# Patient Record
Sex: FEMALE | Race: WHITE | Employment: OTHER | URBAN - METROPOLITAN AREA
[De-identification: names, ages, dates, MRNs, and addresses within clinical notes are randomized per-mention and may not be internally consistent; named-entity substitution may affect disease eponyms.]

---

## 2018-07-24 ENCOUNTER — APPOINTMENT (OUTPATIENT)
Dept: GENERAL RADIOLOGY | Age: 63
End: 2018-07-24
Payer: COMMERCIAL

## 2018-07-24 ENCOUNTER — HOSPITAL ENCOUNTER (EMERGENCY)
Age: 63
Discharge: HOME | End: 2018-07-24
Attending: EMERGENCY MEDICINE
Payer: COMMERCIAL

## 2018-07-24 ENCOUNTER — APPOINTMENT (OUTPATIENT)
Dept: CT IMAGING | Age: 63
End: 2018-07-24
Payer: COMMERCIAL

## 2018-07-24 VITALS
BODY MASS INDEX: 51.04 KG/M2 | HEIGHT: 60 IN | SYSTOLIC BLOOD PRESSURE: 178 MMHG | TEMPERATURE: 98.2 F | OXYGEN SATURATION: 96 % | HEART RATE: 80 BPM | DIASTOLIC BLOOD PRESSURE: 76 MMHG | RESPIRATION RATE: 16 BRPM | WEIGHT: 260 LBS
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
## 2018-07-24 DIAGNOSIS — S02.85XA CLOSED FRACTURE OF ORBITAL WALL, INITIAL ENCOUNTER (HCC): Primary | ICD-10-CM

## 2018-07-24 PROCEDURE — 6370000000 HC RX 637 (ALT 250 FOR IP): Performed by: EMERGENCY MEDICINE

## 2018-07-24 PROCEDURE — 70450 CT HEAD/BRAIN W/O DYE: CPT

## 2018-07-24 PROCEDURE — 70486 CT MAXILLOFACIAL W/O DYE: CPT

## 2018-07-24 PROCEDURE — 73110 X-RAY EXAM OF WRIST: CPT

## 2018-07-24 PROCEDURE — 99284 EMERGENCY DEPT VISIT MOD MDM: CPT

## 2018-07-24 RX ORDER — SENNA PLUS 8.6 MG/1
1 TABLET ORAL DAILY
COMMUNITY

## 2018-07-24 RX ORDER — MELOXICAM 15 MG/1
15 TABLET ORAL DAILY
COMMUNITY
End: 2022-01-01 | Stop reason: HOSPADM

## 2018-07-24 RX ORDER — LOSARTAN POTASSIUM 50 MG/1
50 TABLET ORAL DAILY
COMMUNITY
End: 2022-01-01 | Stop reason: HOSPADM

## 2018-07-24 RX ORDER — SIMVASTATIN 10 MG
10 TABLET ORAL NIGHTLY
COMMUNITY

## 2018-07-24 RX ORDER — OXYCODONE HYDROCHLORIDE AND ACETAMINOPHEN 5; 325 MG/1; MG/1
1 TABLET ORAL ONCE
Status: COMPLETED | OUTPATIENT
Start: 2018-07-24 | End: 2018-07-24

## 2018-07-24 RX ORDER — FUROSEMIDE 40 MG/1
40 TABLET ORAL DAILY
COMMUNITY

## 2018-07-24 RX ORDER — OXYCODONE HYDROCHLORIDE AND ACETAMINOPHEN 5; 325 MG/1; MG/1
1 TABLET ORAL EVERY 6 HOURS PRN
Qty: 10 TABLET | Refills: 0 | Status: SHIPPED | OUTPATIENT
Start: 2018-07-24 | End: 2018-07-27

## 2018-07-24 RX ORDER — PANTOPRAZOLE SODIUM 40 MG/1
40 GRANULE, DELAYED RELEASE ORAL
COMMUNITY

## 2018-07-24 RX ORDER — OXYBUTYNIN CHLORIDE 10 MG/1
10 TABLET, EXTENDED RELEASE ORAL DAILY
COMMUNITY

## 2018-07-24 RX ORDER — POTASSIUM CHLORIDE 1.5 G/1.77G
20 POWDER, FOR SOLUTION ORAL DAILY
COMMUNITY

## 2018-07-24 RX ADMIN — OXYCODONE HYDROCHLORIDE AND ACETAMINOPHEN 1 TABLET: 5; 325 TABLET ORAL at 17:03

## 2018-07-24 ASSESSMENT — ENCOUNTER SYMPTOMS
COUGH: 0
SORE THROAT: 0
PHOTOPHOBIA: 0
DIARRHEA: 0
CONSTIPATION: 0
NAUSEA: 0
SINUS PAIN: 0
SINUS PRESSURE: 0
ABDOMINAL PAIN: 0
FACIAL SWELLING: 1
WHEEZING: 0
VOMITING: 0
RHINORRHEA: 0
SHORTNESS OF BREATH: 0

## 2018-07-24 ASSESSMENT — PAIN DESCRIPTION - PAIN TYPE: TYPE: ACUTE PAIN

## 2018-07-24 ASSESSMENT — PAIN DESCRIPTION - LOCATION: LOCATION: FACE

## 2018-07-24 ASSESSMENT — PAIN SCALES - GENERAL
PAINLEVEL_OUTOF10: 4
PAINLEVEL_OUTOF10: 5

## 2018-07-24 ASSESSMENT — PAIN DESCRIPTION - ORIENTATION: ORIENTATION: RIGHT

## 2018-07-24 NOTE — ED PROVIDER NOTES
Patient is a 43-year-old female who presents from the nursing home via EMS following a fall. 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 Patient denies any loss of consciousness. She does admit to right-sided facial pain surrounding her eye. She denies any blurred vision. Patient is on Plavix. She denies any lightheadedness, dizziness, neck pain, chest pain, shortness of breath, abdominal pain, nausea, vomiting. Patient does complain of left wrist pain, states that she try to break her fall. No treatment prior to arrival.            Review of Systems   Constitutional: Negative for chills, fatigue and fever. HENT: Positive for facial swelling. Negative for congestion, rhinorrhea, sinus pain, sinus pressure, sneezing and sore throat. Eyes: Negative for photophobia and visual disturbance. Respiratory: Negative for cough, shortness of breath and wheezing. Cardiovascular: Negative for chest pain and palpitations. Gastrointestinal: Negative for abdominal pain, constipation, diarrhea, nausea and vomiting. Genitourinary: Negative for dysuria, frequency and hematuria. Musculoskeletal: Positive for arthralgias. Negative for neck pain and neck stiffness. Skin: Negative for rash and wound. Neurological: Negative for dizziness, light-headedness and headaches. Psychiatric/Behavioral: Negative for agitation, behavioral problems and confusion. Physical Exam   Constitutional: She is oriented to person, place, and time. She appears well-developed and well-nourished. No distress. HENT:   Head: Head is with contusion. Ecchymosis around right eye. Tenderness along right orbit. No crepitance. Tenderness of the infraorbital region. No septal hematoma. No hemotympanum. Eyes: Conjunctivae are normal. Pupils are equal, round, and reactive to light. Right eye exhibits no discharge. Left eye exhibits no discharge.    No father, maternal grandmother, mother, and paternal grandmother; Heart Disease in her maternal grandmother, maternal uncle, and mother; High Blood Pressure in her brother, father, maternal aunt, mother, and paternal uncle; High Cholesterol in her brother, father, mother, and paternal uncle; Kidney Disease in her mother; Obesity in her father and mother; Other in her father and mother; Stroke in her maternal aunt, maternal uncle, and paternal uncle. The patients home medications have been reviewed. Allergies: Patient has no known allergies. -------------------------------------------------- RESULTS -------------------------------------------------  Labs:  No results found for this visit on 07/24/18. Radiology:  XR WRIST LEFT (MIN 3 VIEWS)   Final Result   Osteoporosis and moderately severe degenerative changes on   the radial aspect of the wrist but no evidence of fracture or   dislocation. CT Facial Bones WO Contrast   Final Result   Right orbital region fractures as discussed. CT Head WO Contrast   Final Result   No CT evidence of acute intracranial injury. ------------------------- NURSING NOTES AND VITALS REVIEWED ---------------------------  Date / Time Roomed:  7/24/2018  2:50 PM  ED Bed Assignment:  [facility]/H1    The nursing notes within the ED encounter and vital signs as below have been reviewed. BP (!) 178/76   Pulse 80   Temp 98.2 °F (36.8 °C) (Oral)   Resp 16   Ht 5' (1.524 m)   Wt 260 lb (117.9 kg)   SpO2 96%   BMI 50.78 kg/m²   Oxygen Saturation Interpretation: Normal      ------------------------------------------ PROGRESS NOTES ------------------------------------------  Time: 16:32. Patient resting comfortably in bed. Discussed results with the patient. She will follow up with ENT. She will return if she has worsening symptoms. Patient understands and has no further questions at this time.      I have spoken with the patient and discussed todays results, in addition to providing specific details for the plan of care and counseling regarding the diagnosis and prognosis. Their questions are answered at this time and they are agreeable with the plan. I discussed at length with them reasons for immediate return here for re evaluation. They will followup with primary care by calling their office tomorrow. --------------------------------- ADDITIONAL PROVIDER NOTES ---------------------------------  At this time the patient is without objective evidence of an acute process requiring hospitalization or inpatient management. They have remained hemodynamically stable throughout their entire ED visit and are stable for discharge with outpatient follow-up. The plan has been discussed in detail and they are aware of the specific conditions for emergent return, as well as the importance of follow-up. New Prescriptions    OXYCODONE-ACETAMINOPHEN (PERCOCET) 5-325 MG PER TABLET    Take 1 tablet by mouth every 6 hours as needed for Pain for up to 3 days. .       Diagnosis:  1. Closed fracture of orbital wall, initial encounter ([facility] [address]    2. Fall, initial encounter        Disposition:  Patient's disposition: Discharge to home  Patient's condition is stable.                Barbie Monroe,   Resident  07/24/18 5729

## 2018-07-24 NOTE — ED NOTES
Bed: 04  Expected date:   Expected time:   Means of arrival:   Comments:     Ruth Lopez RN  07/24/18 5812

## 2018-07-24 NOTE — CARE COORDINATION
Physician ambulance eta 6:30pm, transport back to [facility] at the [facility], [address] notified.     Electronically signed by CARLOS Cheng on 7/24/2018 at 5:14 PM

## 2018-07-24 NOTE — ED NOTES
Bed: H1  Expected date:   Expected time:   Means of arrival:   Comments:  C/Luke Huynh, [facility]  07/24/18 8871

## 2018-07-27 ENCOUNTER — OFFICE VISIT (OUTPATIENT)
Dept: ENT CLINIC | Age: 63
End: 2018-07-27
Payer: COMMERCIAL

## 2018-07-27 VITALS
HEIGHT: 63 IN | WEIGHT: 278.2 LBS | HEART RATE: 83 BPM | SYSTOLIC BLOOD PRESSURE: 146 MMHG | BODY MASS INDEX: 49.29 KG/M2 | DIASTOLIC BLOOD PRESSURE: 72 MMHG

## 2018-07-27 DIAGNOSIS — S02.85XA ORBITAL FRACTURE, CLOSED, INITIAL ENCOUNTER (HCC): Primary | ICD-10-CM

## 2018-07-27 PROCEDURE — G8427 DOCREV CUR MEDS BY ELIG CLIN: HCPCS | Performed by: OTOLARYNGOLOGY

## 2018-07-27 PROCEDURE — 99204 OFFICE O/P NEW MOD 45 MIN: CPT | Performed by: OTOLARYNGOLOGY

## 2018-07-27 PROCEDURE — 3017F COLORECTAL CA SCREEN DOC REV: CPT | Performed by: OTOLARYNGOLOGY

## 2018-07-27 PROCEDURE — G8417 CALC BMI ABV UP PARAM F/U: HCPCS | Performed by: OTOLARYNGOLOGY

## 2018-07-27 PROCEDURE — 1036F TOBACCO NON-USER: CPT | Performed by: OTOLARYNGOLOGY

## 2018-08-07 ASSESSMENT — ENCOUNTER SYMPTOMS
SHORTNESS OF BREATH: 0
VOMITING: 0
COUGH: 0
HEARTBURN: 0
DOUBLE VISION: 0
BLURRED VISION: 0

## 2018-08-07 NOTE — PROGRESS NOTES
8.6 MG tablet, Take 1 tablet by mouth daily, Disp: , Rfl:     simvastatin (ZOCOR) 10 MG tablet, Take 10 mg by mouth nightly, Disp: , Rfl:     acetaminophen 650 MG TABS, Take 650 mg by mouth every 4 hours as needed. , Disp: 120 tablet, Rfl: 3    clopidogrel (PLAVIX) 75 MG tablet, Take 1 tablet by mouth daily. , Disp: 30 tablet, Rfl: 3    sertraline (ZOLOFT) 50 MG tablet, Take 1 tablet by mouth daily. , Disp: 30 tablet, Rfl: 5    tolterodine (DETROL LA) 4 MG ER capsule, Take 1 capsule by mouth daily. , Disp: 30 capsule, Rfl: 5    FIBER SELECT GUMMIES PO, Take  by mouth. Two gummies qam, Disp: , Rfl:   Patient has no known allergies. Social History   Substance Use Topics    Smoking status: Never Smoker    Smokeless tobacco: Never Used    Alcohol use No      Comment: rarely     Family History   Problem Relation Age of Onset    Cancer Mother         ovarian cancer    Heart Disease Mother         CHF    Other Mother         Hepatitis    Arthritis Mother     Diabetes Mother     High Blood Pressure Mother     High Cholesterol Mother     Kidney Disease Mother     Obesity Mother     Diabetes Father     Other Father         Cirrohis of liver and Black Lung    Alcohol Abuse Father     Arthritis Father     High Blood Pressure Father     High Cholesterol Father     Obesity Father     Arthritis Brother     High Blood Pressure Brother     High Cholesterol Brother     Arthritis Maternal Aunt     High Blood Pressure Maternal Aunt     Stroke Maternal Aunt     Arthritis Maternal Uncle     Heart Disease Maternal Uncle     Stroke Maternal Uncle     Arthritis Paternal Uncle     High Blood Pressure Paternal Uncle     High Cholesterol Paternal Uncle     Stroke Paternal Uncle     Diabetes Maternal Grandmother     Heart Disease Maternal Grandmother     Diabetes Paternal Grandmother        Review of Systems   Constitutional: Negative for chills and fever. HENT: Positive for congestion.  Negative for ear surgical indications this time. Patient will continue ice, prevent any swelling with no nose blowing, finishing therapy in follow-up as needed. Dr. Bonnie Casper D.O.  Ms. Ashwin Pérez.  Otolaryngology Facial Plastic Surgery  :  [facility] Otolaryngology/Facial Plastic Surgery Residency  Associate Clinical Professor:  Rosa Schneider, [facility]

## 2018-09-14 ENCOUNTER — PROCEDURE VISIT (OUTPATIENT)
Dept: AUDIOLOGY | Age: 63
End: 2018-09-14
Payer: COMMERCIAL

## 2018-09-14 ENCOUNTER — OFFICE VISIT (OUTPATIENT)
Dept: ENT CLINIC | Age: 63
End: 2018-09-14
Payer: COMMERCIAL

## 2018-09-14 VITALS
HEART RATE: 66 BPM | HEIGHT: 63 IN | SYSTOLIC BLOOD PRESSURE: 142 MMHG | DIASTOLIC BLOOD PRESSURE: 71 MMHG | BODY MASS INDEX: 50.5 KG/M2 | WEIGHT: 285 LBS

## 2018-09-14 DIAGNOSIS — H90.A22 SENSORINEURAL HEARING LOSS (SNHL) OF LEFT EAR WITH RESTRICTED HEARING OF RIGHT EAR: Primary | ICD-10-CM

## 2018-09-14 DIAGNOSIS — T16.2XXA FB EAR, LEFT, INITIAL ENCOUNTER: Primary | ICD-10-CM

## 2018-09-14 PROCEDURE — 69200 CLEAR OUTER EAR CANAL: CPT | Performed by: OTOLARYNGOLOGY

## 2018-09-14 PROCEDURE — 92557 COMPREHENSIVE HEARING TEST: CPT | Performed by: AUDIOLOGIST

## 2018-09-14 PROCEDURE — 3017F COLORECTAL CA SCREEN DOC REV: CPT | Performed by: OTOLARYNGOLOGY

## 2018-09-14 PROCEDURE — 1036F TOBACCO NON-USER: CPT | Performed by: OTOLARYNGOLOGY

## 2018-09-14 PROCEDURE — G8427 DOCREV CUR MEDS BY ELIG CLIN: HCPCS | Performed by: OTOLARYNGOLOGY

## 2018-09-14 PROCEDURE — 92567 TYMPANOMETRY: CPT | Performed by: AUDIOLOGIST

## 2018-09-14 PROCEDURE — G8417 CALC BMI ABV UP PARAM F/U: HCPCS | Performed by: OTOLARYNGOLOGY

## 2018-09-14 ASSESSMENT — ENCOUNTER SYMPTOMS
RHINORRHEA: 0
EYES NEGATIVE: 1
RESPIRATORY NEGATIVE: 1
SORE THROAT: 0
GASTROINTESTINAL NEGATIVE: 1

## 2018-09-14 NOTE — PROGRESS NOTES
Subjective:      Patient ID:  Everardo Bourgeois is a 61 y.o. female. HPI: Pt first noticed a subjective muffled hearing loss in the left ear a year and a half ago. Pt currently resides at [facility] and has been a resident since 2015. One year prior pt states a doctor told her she had some hearing loss in the left ear. Pt states she had an audiogram one year ago but does not know the results or have a copy. Pt states hearing loss occurred gradually over time. Pt states when her ears pop that the muffled sound resolves temporaily on the left. Pt is on a daily nose spray but does not remember the name. Hearing Loss  Patient presents today with complaints of hearing loss. Concern regarding hearing has been present for 1 year. She had failed a prior hearing test.  The patient reports turning up the T.V..          Patient does not have hearing aids at this time. History of Head trauma: yes   Description: Pt had a fall in July with right orbital fracture, MRI performed at that time  History of surgery to the head/neck: no   Description: none  History of cerumen impaction: no  History of noise exposure: yes   Type: Worked at [employer]: no   Length of time: none  Hearing loss: yes    Fluctuating: yes  Aural pressure: no  Tinnitus: no  Otalgia:no    Patient's medications, allergies, past medical, surgical, social and family histories were reviewed and updated as appropriate. Review of Systems   Constitutional: Negative. HENT: Positive for hearing loss. Negative for congestion, ear discharge, ear pain, postnasal drip, rhinorrhea, sore throat and tinnitus. Eyes: Negative. Respiratory: Negative. Cardiovascular: Negative. Gastrointestinal: Negative. Endocrine: Negative. Genitourinary: Negative. Musculoskeletal: Negative. Skin: Negative. Allergic/Immunologic: Negative for environmental allergies and food allergies.    Neurological: Negative for dizziness, light-headedness

## 2021-01-01 ENCOUNTER — HOSPITAL ENCOUNTER (OUTPATIENT)
Dept: MAMMOGRAPHY | Age: 66
Discharge: HOME | End: 2021-07-03
Payer: MEDICARE

## 2021-01-01 VITALS — WEIGHT: 275 LBS | BODY MASS INDEX: 50.61 KG/M2 | HEIGHT: 62 IN

## 2021-01-01 DIAGNOSIS — Z12.31 SCREENING MAMMOGRAM FOR HIGH-RISK PATIENT: ICD-10-CM

## 2021-01-01 PROCEDURE — 77063 BREAST TOMOSYNTHESIS BI: CPT

## 2022-01-01 ENCOUNTER — APPOINTMENT (OUTPATIENT)
Dept: GENERAL RADIOLOGY | Age: 67
DRG: 698 | End: 2022-01-01
Payer: MEDICARE

## 2022-01-01 ENCOUNTER — HOSPITAL ENCOUNTER (INPATIENT)
Age: 67
LOS: 2 days | Discharge: NURSING, REHAB OR LONG-TERM CARE | DRG: 872 | End: 2022-03-29
Attending: EMERGENCY MEDICINE | Admitting: INTERNAL MEDICINE
Payer: MEDICARE

## 2022-01-01 ENCOUNTER — HOSPITAL ENCOUNTER (INPATIENT)
Age: 67
LOS: 10 days | DRG: 698 | End: 2022-05-10
Attending: EMERGENCY MEDICINE | Admitting: FAMILY MEDICINE
Payer: MEDICARE

## 2022-01-01 ENCOUNTER — APPOINTMENT (OUTPATIENT)
Dept: CT IMAGING | Age: 67
DRG: 872 | End: 2022-01-01
Payer: MEDICARE

## 2022-01-01 ENCOUNTER — APPOINTMENT (OUTPATIENT)
Dept: NEUROLOGY | Age: 67
DRG: 698 | End: 2022-01-01
Payer: MEDICARE

## 2022-01-01 ENCOUNTER — APPOINTMENT (OUTPATIENT)
Dept: CT IMAGING | Age: 67
DRG: 698 | End: 2022-01-01
Payer: MEDICARE

## 2022-01-01 VITALS
TEMPERATURE: 98 F | OXYGEN SATURATION: 96 % | SYSTOLIC BLOOD PRESSURE: 102 MMHG | BODY MASS INDEX: 41.61 KG/M2 | HEART RATE: 92 BPM | DIASTOLIC BLOOD PRESSURE: 54 MMHG | HEIGHT: 62 IN | WEIGHT: 226.13 LBS | RESPIRATION RATE: 22 BRPM

## 2022-01-01 VITALS
WEIGHT: 218.5 LBS | HEART RATE: 89 BPM | SYSTOLIC BLOOD PRESSURE: 101 MMHG | BODY MASS INDEX: 40.21 KG/M2 | DIASTOLIC BLOOD PRESSURE: 61 MMHG | TEMPERATURE: 98.4 F | RESPIRATION RATE: 18 BRPM | OXYGEN SATURATION: 99 % | HEIGHT: 62 IN

## 2022-01-01 DIAGNOSIS — G93.41 ACUTE METABOLIC ENCEPHALOPATHY: Primary | ICD-10-CM

## 2022-01-01 DIAGNOSIS — N93.9 VAGINAL BLEEDING: Primary | ICD-10-CM

## 2022-01-01 DIAGNOSIS — N30.01 ACUTE CYSTITIS WITH HEMATURIA: ICD-10-CM

## 2022-01-01 DIAGNOSIS — N17.9 ACUTE KIDNEY INJURY (HCC): ICD-10-CM

## 2022-01-01 DIAGNOSIS — N39.0 COMPLICATED UTI (URINARY TRACT INFECTION): ICD-10-CM

## 2022-01-01 LAB
ABO/RH: NORMAL
ACINETOBACTER CALCOAC BAUMANNII COMPLEX BY PCR: NOT DETECTED
ALBUMIN SERPL-MCNC: 1.6 G/DL (ref 3.5–5.2)
ALBUMIN SERPL-MCNC: 1.7 G/DL (ref 3.5–5.2)
ALBUMIN SERPL-MCNC: 1.7 G/DL (ref 3.5–5.2)
ALBUMIN SERPL-MCNC: 1.8 G/DL (ref 3.5–5.2)
ALBUMIN SERPL-MCNC: 1.8 G/DL (ref 3.5–5.2)
ALBUMIN SERPL-MCNC: 2 G/DL (ref 3.5–5.2)
ALBUMIN SERPL-MCNC: 2.7 G/DL (ref 3.5–5.2)
ALBUMIN SERPL-MCNC: 2.7 G/DL (ref 3.5–5.2)
ALBUMIN SERPL-MCNC: 2.8 G/DL (ref 3.5–5.2)
ALP BLD-CCNC: 109 U/L (ref 35–104)
ALP BLD-CCNC: 121 U/L (ref 35–104)
ALP BLD-CCNC: 127 U/L (ref 35–104)
ALP BLD-CCNC: 148 U/L (ref 35–104)
ALP BLD-CCNC: 79 U/L (ref 35–104)
ALP BLD-CCNC: 83 U/L (ref 35–104)
ALP BLD-CCNC: 83 U/L (ref 35–104)
ALP BLD-CCNC: 84 U/L (ref 35–104)
ALP BLD-CCNC: 88 U/L (ref 35–104)
ALP BLD-CCNC: 89 U/L (ref 35–104)
ALP BLD-CCNC: 91 U/L (ref 35–104)
ALT SERPL-CCNC: 5 U/L (ref 0–32)
ALT SERPL-CCNC: 6 U/L (ref 0–32)
ALT SERPL-CCNC: 8 U/L (ref 0–32)
ALT SERPL-CCNC: 9 U/L (ref 0–32)
AMMONIA: 11 UMOL/L (ref 11–51)
AMMONIA: 15 UMOL/L (ref 11–51)
AMORPHOUS: PRESENT
ANION GAP SERPL CALCULATED.3IONS-SCNC: 10 MMOL/L (ref 7–16)
ANION GAP SERPL CALCULATED.3IONS-SCNC: 10 MMOL/L (ref 7–16)
ANION GAP SERPL CALCULATED.3IONS-SCNC: 11 MMOL/L (ref 7–16)
ANION GAP SERPL CALCULATED.3IONS-SCNC: 14 MMOL/L (ref 7–16)
ANION GAP SERPL CALCULATED.3IONS-SCNC: 15 MMOL/L (ref 7–16)
ANION GAP SERPL CALCULATED.3IONS-SCNC: 20 MMOL/L (ref 7–16)
ANION GAP SERPL CALCULATED.3IONS-SCNC: 6 MMOL/L (ref 7–16)
ANION GAP SERPL CALCULATED.3IONS-SCNC: 7 MMOL/L (ref 7–16)
ANION GAP SERPL CALCULATED.3IONS-SCNC: 8 MMOL/L (ref 7–16)
ANION GAP SERPL CALCULATED.3IONS-SCNC: 9 MMOL/L (ref 7–16)
ANISOCYTOSIS: ABNORMAL
ANTI-NUCLEAR ANTIBODY (ANA): NEGATIVE
ANTIBODY SCREEN: NORMAL
ANTIBODY SCREEN: NORMAL
APTT: 23 SEC (ref 24.5–35.1)
AST SERPL-CCNC: 10 U/L (ref 0–31)
AST SERPL-CCNC: 10 U/L (ref 0–31)
AST SERPL-CCNC: 11 U/L (ref 0–31)
AST SERPL-CCNC: 12 U/L (ref 0–31)
AST SERPL-CCNC: 13 U/L (ref 0–31)
AST SERPL-CCNC: 6 U/L (ref 0–31)
AST SERPL-CCNC: 7 U/L (ref 0–31)
AST SERPL-CCNC: 8 U/L (ref 0–31)
AST SERPL-CCNC: 9 U/L (ref 0–31)
B.E.: -0.4 MMOL/L (ref -3–3)
B.E.: 1.1 MMOL/L (ref -3–3)
BACTERIA: ABNORMAL /HPF
BACTERIA: ABNORMAL /HPF
BACTEROIDES FRAGILIS BY PCR: NOT DETECTED
BASOPHILS ABSOLUTE: 0 E9/L (ref 0–0.2)
BASOPHILS ABSOLUTE: 0.02 E9/L (ref 0–0.2)
BASOPHILS ABSOLUTE: 0.02 E9/L (ref 0–0.2)
BASOPHILS ABSOLUTE: 0.03 E9/L (ref 0–0.2)
BASOPHILS ABSOLUTE: 0.05 E9/L (ref 0–0.2)
BASOPHILS ABSOLUTE: 0.05 E9/L (ref 0–0.2)
BASOPHILS ABSOLUTE: 0.07 E9/L (ref 0–0.2)
BASOPHILS ABSOLUTE: 0.1 E9/L (ref 0–0.2)
BASOPHILS ABSOLUTE: 0.11 E9/L (ref 0–0.2)
BASOPHILS RELATIVE PERCENT: 0 % (ref 0–2)
BASOPHILS RELATIVE PERCENT: 0.1 % (ref 0–2)
BASOPHILS RELATIVE PERCENT: 0.2 % (ref 0–2)
BASOPHILS RELATIVE PERCENT: 0.3 % (ref 0–2)
BASOPHILS RELATIVE PERCENT: 0.3 % (ref 0–2)
BASOPHILS RELATIVE PERCENT: 0.4 % (ref 0–2)
BASOPHILS RELATIVE PERCENT: 0.4 % (ref 0–2)
BASOPHILS RELATIVE PERCENT: 0.9 % (ref 0–2)
BASOPHILS RELATIVE PERCENT: 1 % (ref 0–2)
BILIRUB SERPL-MCNC: 0.2 MG/DL (ref 0–1.2)
BILIRUB SERPL-MCNC: 0.3 MG/DL (ref 0–1.2)
BILIRUB SERPL-MCNC: 0.4 MG/DL (ref 0–1.2)
BILIRUB SERPL-MCNC: 0.5 MG/DL (ref 0–1.2)
BILIRUB SERPL-MCNC: 0.5 MG/DL (ref 0–1.2)
BILIRUBIN URINE: ABNORMAL
BILIRUBIN URINE: ABNORMAL
BLOOD BANK DISPENSE STATUS: NORMAL
BLOOD BANK PRODUCT CODE: NORMAL
BLOOD, URINE: ABNORMAL
BLOOD, URINE: ABNORMAL
BOTTLE TYPE: ABNORMAL
BPU ID: NORMAL
BUN BLDV-MCNC: 11 MG/DL (ref 6–23)
BUN BLDV-MCNC: 14 MG/DL (ref 6–23)
BUN BLDV-MCNC: 20 MG/DL (ref 6–23)
BUN BLDV-MCNC: 23 MG/DL (ref 6–23)
BUN BLDV-MCNC: 25 MG/DL (ref 6–23)
BUN BLDV-MCNC: 26 MG/DL (ref 6–23)
BUN BLDV-MCNC: 26 MG/DL (ref 6–23)
BUN BLDV-MCNC: 27 MG/DL (ref 6–23)
BUN BLDV-MCNC: 55 MG/DL (ref 6–23)
BUN BLDV-MCNC: 7 MG/DL (ref 6–23)
BUN BLDV-MCNC: 8 MG/DL (ref 6–23)
BUN BLDV-MCNC: 8 MG/DL (ref 6–23)
BUN BLDV-MCNC: 83 MG/DL (ref 6–23)
BUN BLDV-MCNC: 9 MG/DL (ref 6–23)
BURR CELLS: ABNORMAL
CALCIUM IONIZED: 1.1 MMOL/L (ref 1.15–1.33)
CALCIUM SERPL-MCNC: 6.8 MG/DL (ref 8.6–10.2)
CALCIUM SERPL-MCNC: 6.8 MG/DL (ref 8.6–10.2)
CALCIUM SERPL-MCNC: 7 MG/DL (ref 8.6–10.2)
CALCIUM SERPL-MCNC: 7.2 MG/DL (ref 8.6–10.2)
CALCIUM SERPL-MCNC: 7.2 MG/DL (ref 8.6–10.2)
CALCIUM SERPL-MCNC: 7.6 MG/DL (ref 8.6–10.2)
CALCIUM SERPL-MCNC: 7.7 MG/DL (ref 8.6–10.2)
CALCIUM SERPL-MCNC: 7.7 MG/DL (ref 8.6–10.2)
CALCIUM SERPL-MCNC: 8.4 MG/DL (ref 8.6–10.2)
CALCIUM SERPL-MCNC: 8.4 MG/DL (ref 8.6–10.2)
CALCIUM SERPL-MCNC: 8.7 MG/DL (ref 8.6–10.2)
CALCIUM SERPL-MCNC: 9 MG/DL (ref 8.6–10.2)
CANDIDA ALBICANS BY PCR: NOT DETECTED
CANDIDA AURIS BY PCR: NOT DETECTED
CANDIDA GLABRATA BY PCR: NOT DETECTED
CANDIDA KRUSEI BY PCR: NOT DETECTED
CANDIDA PARAPSILOSIS BY PCR: NOT DETECTED
CANDIDA TROPICALIS BY PCR: NOT DETECTED
CARBAPENEM RESISTANCE IMP GENE BY PCR: NOT DETECTED
CARBAPENEM RESISTANCE KPC BY PCR: NOT DETECTED
CARBAPENEM RESISTANCE NDM GENE BY PCR: NOT DETECTED
CARBAPENEM RESISTANCE OXA-48 GENE BY PCR: NOT DETECTED
CARBAPENEM RESISTANCE VIM GENE BY PCR: NOT DETECTED
CEPHALOSPORIN RESISTANCE CTX-M GENE BY PCR: DETECTED
CHLORIDE BLD-SCNC: 104 MMOL/L (ref 98–107)
CHLORIDE BLD-SCNC: 106 MMOL/L (ref 98–107)
CHLORIDE BLD-SCNC: 107 MMOL/L (ref 98–107)
CHLORIDE BLD-SCNC: 108 MMOL/L (ref 98–107)
CHLORIDE BLD-SCNC: 108 MMOL/L (ref 98–107)
CHLORIDE BLD-SCNC: 109 MMOL/L (ref 98–107)
CHLORIDE BLD-SCNC: 109 MMOL/L (ref 98–107)
CHLORIDE BLD-SCNC: 111 MMOL/L (ref 98–107)
CHLORIDE BLD-SCNC: 113 MMOL/L (ref 98–107)
CHLORIDE BLD-SCNC: 114 MMOL/L (ref 98–107)
CHLORIDE BLD-SCNC: 114 MMOL/L (ref 98–107)
CHLORIDE BLD-SCNC: 99 MMOL/L (ref 98–107)
CHP ED QC CHECK: YES
CLARITY: ABNORMAL
CLARITY: ABNORMAL
CO2: 18 MMOL/L (ref 22–29)
CO2: 20 MMOL/L (ref 22–29)
CO2: 21 MMOL/L (ref 22–29)
CO2: 22 MMOL/L (ref 22–29)
CO2: 23 MMOL/L (ref 22–29)
CO2: 24 MMOL/L (ref 22–29)
CO2: 25 MMOL/L (ref 22–29)
COHB: 0.4 % (ref 0–1.5)
COHB: 0.7 % (ref 0–1.5)
COLISTIN RESISTANCE MCR-1 GENE BY PCR: NOT DETECTED
COLOR: ABNORMAL
COLOR: ABNORMAL
COPPER: 68.1 UG/DL (ref 80–155)
CREAT SERPL-MCNC: 0.5 MG/DL (ref 0.5–1)
CREAT SERPL-MCNC: 0.6 MG/DL (ref 0.5–1)
CREAT SERPL-MCNC: 0.6 MG/DL (ref 0.5–1)
CREAT SERPL-MCNC: 0.8 MG/DL (ref 0.5–1)
CREAT SERPL-MCNC: 1 MG/DL (ref 0.5–1)
CREAT SERPL-MCNC: 1.2 MG/DL (ref 0.5–1)
CREAT SERPL-MCNC: 2 MG/DL (ref 0.5–1)
CRITICAL: ABNORMAL
CRITICAL: ABNORMAL
CRYPTOCOCCUS NEOFORMANS/GATTII BY PCR: NOT DETECTED
CRYSTALS, UA: ABNORMAL /HPF
CULTURE, BLOOD 2: NORMAL
DAT POLYSPECIFIC: NORMAL
DATE ANALYZED: ABNORMAL
DATE ANALYZED: ABNORMAL
DATE OF COLLECTION: ABNORMAL
DATE OF COLLECTION: ABNORMAL
DESCRIPTION BLOOD BANK: NORMAL
EKG ATRIAL RATE: 106 BPM
EKG ATRIAL RATE: 109 BPM
EKG P AXIS: 10 DEGREES
EKG P AXIS: 68 DEGREES
EKG P-R INTERVAL: 126 MS
EKG P-R INTERVAL: 146 MS
EKG Q-T INTERVAL: 382 MS
EKG Q-T INTERVAL: 392 MS
EKG QRS DURATION: 102 MS
EKG QRS DURATION: 104 MS
EKG QTC CALCULATION (BAZETT): 507 MS
EKG QTC CALCULATION (BAZETT): 527 MS
EKG R AXIS: -55 DEGREES
EKG R AXIS: -57 DEGREES
EKG T AXIS: 118 DEGREES
EKG T AXIS: 146 DEGREES
EKG VENTRICULAR RATE: 106 BPM
EKG VENTRICULAR RATE: 109 BPM
ENTEROBACTER CLOACAE COMPLEX BY PCR: NOT DETECTED
ENTEROBACTERALES BY PCR: DETECTED
ENTEROCOCCUS FAECALIS BY PCR: NOT DETECTED
ENTEROCOCCUS FAECIUM BY PCR: NOT DETECTED
EOSINOPHILS ABSOLUTE: 0 E9/L (ref 0.05–0.5)
EOSINOPHILS ABSOLUTE: 0.05 E9/L (ref 0.05–0.5)
EOSINOPHILS ABSOLUTE: 0.09 E9/L (ref 0.05–0.5)
EOSINOPHILS ABSOLUTE: 0.09 E9/L (ref 0.05–0.5)
EOSINOPHILS ABSOLUTE: 0.29 E9/L (ref 0.05–0.5)
EOSINOPHILS ABSOLUTE: 0.32 E9/L (ref 0.05–0.5)
EOSINOPHILS ABSOLUTE: 0.35 E9/L (ref 0.05–0.5)
EOSINOPHILS ABSOLUTE: 0.41 E9/L (ref 0.05–0.5)
EOSINOPHILS ABSOLUTE: 0.43 E9/L (ref 0.05–0.5)
EOSINOPHILS ABSOLUTE: 0.43 E9/L (ref 0.05–0.5)
EOSINOPHILS ABSOLUTE: 0.44 E9/L (ref 0.05–0.5)
EOSINOPHILS RELATIVE PERCENT: 0 % (ref 0–6)
EOSINOPHILS RELATIVE PERCENT: 0.9 % (ref 0–6)
EOSINOPHILS RELATIVE PERCENT: 0.9 % (ref 0–6)
EOSINOPHILS RELATIVE PERCENT: 1.6 % (ref 0–6)
EOSINOPHILS RELATIVE PERCENT: 3.5 % (ref 0–6)
EOSINOPHILS RELATIVE PERCENT: 3.8 % (ref 0–6)
EOSINOPHILS RELATIVE PERCENT: 4.1 % (ref 0–6)
EOSINOPHILS RELATIVE PERCENT: 4.2 % (ref 0–6)
EOSINOPHILS RELATIVE PERCENT: 4.2 % (ref 0–6)
EOSINOPHILS RELATIVE PERCENT: 5.3 % (ref 0–6)
EOSINOPHILS RELATIVE PERCENT: 6 % (ref 0–6)
ESCHERICHIA COLI BY PCR: DETECTED
FERRITIN: 1482 NG/ML
FOLATE: <2 NG/ML (ref 4.8–24.2)
GFR AFRICAN AMERICAN: 30
GFR AFRICAN AMERICAN: 54
GFR AFRICAN AMERICAN: >60
GFR NON-AFRICAN AMERICAN: 25 ML/MIN/1.73
GFR NON-AFRICAN AMERICAN: 45 ML/MIN/1.73
GFR NON-AFRICAN AMERICAN: 55 ML/MIN/1.73
GFR NON-AFRICAN AMERICAN: >60 ML/MIN/1.73
GLUCOSE BLD-MCNC: 113 MG/DL
GLUCOSE BLD-MCNC: 136 MG/DL (ref 74–99)
GLUCOSE BLD-MCNC: 138 MG/DL (ref 74–99)
GLUCOSE BLD-MCNC: 141 MG/DL (ref 74–99)
GLUCOSE BLD-MCNC: 146 MG/DL (ref 74–99)
GLUCOSE BLD-MCNC: 155 MG/DL (ref 74–99)
GLUCOSE BLD-MCNC: 155 MG/DL (ref 74–99)
GLUCOSE BLD-MCNC: 197 MG/DL (ref 74–99)
GLUCOSE BLD-MCNC: 198 MG/DL (ref 74–99)
GLUCOSE BLD-MCNC: 198 MG/DL (ref 74–99)
GLUCOSE BLD-MCNC: 386 MG/DL (ref 74–99)
GLUCOSE BLD-MCNC: 404 MG/DL (ref 74–99)
GLUCOSE BLD-MCNC: 410 MG/DL (ref 74–99)
GLUCOSE BLD-MCNC: 84 MG/DL (ref 74–99)
GLUCOSE BLD-MCNC: 87 MG/DL (ref 74–99)
GLUCOSE URINE: NEGATIVE MG/DL
GLUCOSE URINE: NEGATIVE MG/DL
HAEMOPHILUS INFLUENZAE BY PCR: NOT DETECTED
HAPTOGLOBIN: 242 MG/DL (ref 30–200)
HAV IGM SER IA-ACNC: NORMAL
HCO3: 23.3 MMOL/L (ref 22–26)
HCO3: 23.4 MMOL/L (ref 22–26)
HCT VFR BLD CALC: 14.5 % (ref 34–48)
HCT VFR BLD CALC: 20.7 % (ref 34–48)
HCT VFR BLD CALC: 20.8 % (ref 34–48)
HCT VFR BLD CALC: 21.6 % (ref 34–48)
HCT VFR BLD CALC: 22.5 % (ref 34–48)
HCT VFR BLD CALC: 22.6 % (ref 34–48)
HCT VFR BLD CALC: 23.1 % (ref 34–48)
HCT VFR BLD CALC: 23.4 % (ref 34–48)
HCT VFR BLD CALC: 23.4 % (ref 34–48)
HCT VFR BLD CALC: 23.6 % (ref 34–48)
HCT VFR BLD CALC: 23.6 % (ref 34–48)
HCT VFR BLD CALC: 23.9 % (ref 34–48)
HCT VFR BLD CALC: 24.1 % (ref 34–48)
HCT VFR BLD CALC: 24.3 % (ref 34–48)
HCT VFR BLD CALC: 24.4 % (ref 34–48)
HCT VFR BLD CALC: 24.6 % (ref 34–48)
HCT VFR BLD CALC: 24.8 % (ref 34–48)
HCT VFR BLD CALC: 24.9 % (ref 34–48)
HCT VFR BLD CALC: 25 % (ref 34–48)
HCT VFR BLD CALC: 25.1 % (ref 34–48)
HCT VFR BLD CALC: 25.7 % (ref 34–48)
HCT VFR BLD CALC: 25.7 % (ref 34–48)
HCT VFR BLD CALC: 25.9 % (ref 34–48)
HCT VFR BLD CALC: 26.1 % (ref 34–48)
HCT VFR BLD CALC: 26.3 % (ref 34–48)
HCT VFR BLD CALC: 26.4 % (ref 34–48)
HCT VFR BLD CALC: 33.2 % (ref 34–48)
HCT VFR BLD CALC: 35.9 % (ref 34–48)
HCT VFR BLD CALC: 37.5 % (ref 34–48)
HCT VFR BLD CALC: 44 % (ref 34–48)
HEMOGLOBIN: 10.6 G/DL (ref 11.5–15.5)
HEMOGLOBIN: 11.3 G/DL (ref 11.5–15.5)
HEMOGLOBIN: 11.6 G/DL (ref 11.5–15.5)
HEMOGLOBIN: 13.7 G/DL (ref 11.5–15.5)
HEMOGLOBIN: 4.8 G/DL (ref 11.5–15.5)
HEMOGLOBIN: 6.6 G/DL (ref 11.5–15.5)
HEMOGLOBIN: 6.7 G/DL (ref 11.5–15.5)
HEMOGLOBIN: 6.9 G/DL (ref 11.5–15.5)
HEMOGLOBIN: 7.1 G/DL (ref 11.5–15.5)
HEMOGLOBIN: 7.2 G/DL (ref 11.5–15.5)
HEMOGLOBIN: 7.3 G/DL (ref 11.5–15.5)
HEMOGLOBIN: 7.4 G/DL (ref 11.5–15.5)
HEMOGLOBIN: 7.4 G/DL (ref 11.5–15.5)
HEMOGLOBIN: 7.5 G/DL (ref 11.5–15.5)
HEMOGLOBIN: 7.6 G/DL (ref 11.5–15.5)
HEMOGLOBIN: 7.8 G/DL (ref 11.5–15.5)
HEMOGLOBIN: 7.8 G/DL (ref 11.5–15.5)
HEMOGLOBIN: 7.9 G/DL (ref 11.5–15.5)
HEMOGLOBIN: 7.9 G/DL (ref 11.5–15.5)
HEMOGLOBIN: 8 G/DL (ref 11.5–15.5)
HEMOGLOBIN: 8.1 G/DL (ref 11.5–15.5)
HEMOGLOBIN: 8.1 G/DL (ref 11.5–15.5)
HEMOGLOBIN: 8.2 G/DL (ref 11.5–15.5)
HEMOGLOBIN: 8.3 G/DL (ref 11.5–15.5)
HEMOGLOBIN: 8.4 G/DL (ref 11.5–15.5)
HEMOGLOBIN: 8.5 G/DL (ref 11.5–15.5)
HEPATITIS B CORE IGM ANTIBODY: NORMAL
HEPATITIS B SURFACE ANTIGEN INTERPRETATION: NORMAL
HEPATITIS C ANTIBODY INTERPRETATION: NORMAL
HHB: 1 % (ref 0–5)
HHB: 3.5 % (ref 0–5)
HIV-1 AND HIV-2 ANTIBODIES: NORMAL
HOMOCYSTEINE: 46.5 UMOL/L (ref 0–15)
HYPOCHROMIA: ABNORMAL
IMMATURE GRANULOCYTES #: 0.07 E9/L
IMMATURE GRANULOCYTES #: 0.13 E9/L
IMMATURE GRANULOCYTES #: 0.15 E9/L
IMMATURE GRANULOCYTES #: 0.15 E9/L
IMMATURE GRANULOCYTES #: 0.22 E9/L
IMMATURE GRANULOCYTES #: 0.22 E9/L
IMMATURE GRANULOCYTES #: 0.43 E9/L
IMMATURE GRANULOCYTES %: 1.2 % (ref 0–5)
IMMATURE GRANULOCYTES %: 1.2 % (ref 0–5)
IMMATURE GRANULOCYTES %: 1.3 % (ref 0–5)
IMMATURE GRANULOCYTES %: 1.8 % (ref 0–5)
IMMATURE GRANULOCYTES %: 2.2 % (ref 0–5)
IMMATURE GRANULOCYTES %: 2.2 % (ref 0–5)
IMMATURE GRANULOCYTES %: 2.6 % (ref 0–5)
IMMATURE RETIC FRACT: 9 % (ref 3–15.9)
INR BLD: 1.1
IRON SATURATION: ABNORMAL % (ref 15–50)
IRON: 82 MCG/DL (ref 37–145)
KETONES, URINE: 15 MG/DL
KETONES, URINE: ABNORMAL MG/DL
KLEBSIELLA AEROGENES BY PCR: NOT DETECTED
KLEBSIELLA OXYTOCA BY PCR: NOT DETECTED
KLEBSIELLA PNEUMONIAE GROUP BY PCR: NOT DETECTED
LAB: ABNORMAL
LAB: ABNORMAL
LACTATE DEHYDROGENASE: 171 U/L (ref 135–214)
LACTIC ACID, SEPSIS: 1.9 MMOL/L (ref 0.5–1.9)
LACTIC ACID, SEPSIS: 2.5 MMOL/L (ref 0.5–1.9)
LACTIC ACID, SEPSIS: 2.6 MMOL/L (ref 0.5–1.9)
LACTIC ACID, SEPSIS: 2.8 MMOL/L (ref 0.5–1.9)
LACTIC ACID: 1.8 MMOL/L (ref 0.5–2.2)
LACTIC ACID: 3 MMOL/L (ref 0.5–2.2)
LACTIC ACID: 3.2 MMOL/L (ref 0.5–2.2)
LEUKOCYTE ESTERASE, URINE: ABNORMAL
LEUKOCYTE ESTERASE, URINE: ABNORMAL
LISTERIA MONOCYTOGENES BY PCR: NOT DETECTED
LV EF: 63 %
LVEF MODALITY: NORMAL
LYMPHOCYTES ABSOLUTE: 0.96 E9/L (ref 1.5–4)
LYMPHOCYTES ABSOLUTE: 0.97 E9/L (ref 1.5–4)
LYMPHOCYTES ABSOLUTE: 1.23 E9/L (ref 1.5–4)
LYMPHOCYTES ABSOLUTE: 1.59 E9/L (ref 1.5–4)
LYMPHOCYTES ABSOLUTE: 1.81 E9/L (ref 1.5–4)
LYMPHOCYTES ABSOLUTE: 1.82 E9/L (ref 1.5–4)
LYMPHOCYTES ABSOLUTE: 2.27 E9/L (ref 1.5–4)
LYMPHOCYTES ABSOLUTE: 3.03 E9/L (ref 1.5–4)
LYMPHOCYTES ABSOLUTE: 3.45 E9/L (ref 1.5–4)
LYMPHOCYTES ABSOLUTE: 3.49 E9/L (ref 1.5–4)
LYMPHOCYTES ABSOLUTE: 4.02 E9/L (ref 1.5–4)
LYMPHOCYTES ABSOLUTE: 4.07 E9/L (ref 1.5–4)
LYMPHOCYTES ABSOLUTE: 4.18 E9/L (ref 1.5–4)
LYMPHOCYTES RELATIVE PERCENT: 17.7 % (ref 20–42)
LYMPHOCYTES RELATIVE PERCENT: 19.1 % (ref 20–42)
LYMPHOCYTES RELATIVE PERCENT: 2.6 % (ref 20–42)
LYMPHOCYTES RELATIVE PERCENT: 21.9 % (ref 20–42)
LYMPHOCYTES RELATIVE PERCENT: 27.2 % (ref 20–42)
LYMPHOCYTES RELATIVE PERCENT: 28.5 % (ref 20–42)
LYMPHOCYTES RELATIVE PERCENT: 40.9 % (ref 20–42)
LYMPHOCYTES RELATIVE PERCENT: 42.7 % (ref 20–42)
LYMPHOCYTES RELATIVE PERCENT: 48.1 % (ref 20–42)
LYMPHOCYTES RELATIVE PERCENT: 52.9 % (ref 20–42)
LYMPHOCYTES RELATIVE PERCENT: 55 % (ref 20–42)
LYMPHOCYTES RELATIVE PERCENT: 6.1 % (ref 20–42)
LYMPHOCYTES RELATIVE PERCENT: 9.3 % (ref 20–42)
Lab: ABNORMAL
Lab: ABNORMAL
Lab: NORMAL
MAGNESIUM: 1.6 MG/DL (ref 1.6–2.6)
MAGNESIUM: 1.7 MG/DL (ref 1.6–2.6)
MAGNESIUM: 1.8 MG/DL (ref 1.6–2.6)
MAGNESIUM: 1.9 MG/DL (ref 1.6–2.6)
MAGNESIUM: 2.2 MG/DL (ref 1.6–2.6)
MCH RBC QN AUTO: 27.2 PG (ref 26–35)
MCH RBC QN AUTO: 27.4 PG (ref 26–35)
MCH RBC QN AUTO: 27.6 PG (ref 26–35)
MCH RBC QN AUTO: 27.9 PG (ref 26–35)
MCH RBC QN AUTO: 28 PG (ref 26–35)
MCH RBC QN AUTO: 28.2 PG (ref 26–35)
MCH RBC QN AUTO: 28.3 PG (ref 26–35)
MCH RBC QN AUTO: 28.4 PG (ref 26–35)
MCH RBC QN AUTO: 28.6 PG (ref 26–35)
MCH RBC QN AUTO: 28.8 PG (ref 26–35)
MCHC RBC AUTO-ENTMCNC: 30.8 % (ref 32–34.5)
MCHC RBC AUTO-ENTMCNC: 30.9 % (ref 32–34.5)
MCHC RBC AUTO-ENTMCNC: 31.1 % (ref 32–34.5)
MCHC RBC AUTO-ENTMCNC: 31.4 % (ref 32–34.5)
MCHC RBC AUTO-ENTMCNC: 31.5 % (ref 32–34.5)
MCHC RBC AUTO-ENTMCNC: 31.8 % (ref 32–34.5)
MCHC RBC AUTO-ENTMCNC: 31.9 % (ref 32–34.5)
MCHC RBC AUTO-ENTMCNC: 32.3 % (ref 32–34.5)
MCHC RBC AUTO-ENTMCNC: 32.4 % (ref 32–34.5)
MCHC RBC AUTO-ENTMCNC: 32.4 % (ref 32–34.5)
MCHC RBC AUTO-ENTMCNC: 32.5 % (ref 32–34.5)
MCHC RBC AUTO-ENTMCNC: 32.5 % (ref 32–34.5)
MCHC RBC AUTO-ENTMCNC: 32.7 % (ref 32–34.5)
MCHC RBC AUTO-ENTMCNC: 33.1 % (ref 32–34.5)
MCV RBC AUTO: 84.7 FL (ref 80–99.9)
MCV RBC AUTO: 85.1 FL (ref 80–99.9)
MCV RBC AUTO: 85.2 FL (ref 80–99.9)
MCV RBC AUTO: 85.3 FL (ref 80–99.9)
MCV RBC AUTO: 85.7 FL (ref 80–99.9)
MCV RBC AUTO: 87.4 FL (ref 80–99.9)
MCV RBC AUTO: 87.6 FL (ref 80–99.9)
MCV RBC AUTO: 88.6 FL (ref 80–99.9)
MCV RBC AUTO: 88.9 FL (ref 80–99.9)
MCV RBC AUTO: 88.9 FL (ref 80–99.9)
MCV RBC AUTO: 89 FL (ref 80–99.9)
MCV RBC AUTO: 90 FL (ref 80–99.9)
MCV RBC AUTO: 90.9 FL (ref 80–99.9)
MCV RBC AUTO: 92.4 FL (ref 80–99.9)
METAMYELOCYTES RELATIVE PERCENT: 0.9 % (ref 0–1)
METER GLUCOSE: 113 MG/DL (ref 74–99)
METER GLUCOSE: 129 MG/DL (ref 74–99)
METER GLUCOSE: 140 MG/DL (ref 74–99)
METER GLUCOSE: 164 MG/DL (ref 74–99)
METER GLUCOSE: 177 MG/DL (ref 74–99)
METHB: 0.3 % (ref 0–1.5)
METHB: 0.3 % (ref 0–1.5)
MODE: ABNORMAL
MODE: ABNORMAL
MONOCYTES ABSOLUTE: 0 E9/L (ref 0.1–0.95)
MONOCYTES ABSOLUTE: 0.06 E9/L (ref 0.1–0.95)
MONOCYTES ABSOLUTE: 0.15 E9/L (ref 0.1–0.95)
MONOCYTES ABSOLUTE: 0.22 E9/L (ref 0.1–0.95)
MONOCYTES ABSOLUTE: 0.25 E9/L (ref 0.1–0.95)
MONOCYTES ABSOLUTE: 0.32 E9/L (ref 0.1–0.95)
MONOCYTES ABSOLUTE: 0.38 E9/L (ref 0.1–0.95)
MONOCYTES ABSOLUTE: 0.38 E9/L (ref 0.1–0.95)
MONOCYTES ABSOLUTE: 0.42 E9/L (ref 0.1–0.95)
MONOCYTES ABSOLUTE: 0.5 E9/L (ref 0.1–0.95)
MONOCYTES ABSOLUTE: 0.57 E9/L (ref 0.1–0.95)
MONOCYTES ABSOLUTE: 0.6 E9/L (ref 0.1–0.95)
MONOCYTES ABSOLUTE: 0.73 E9/L (ref 0.1–0.95)
MONOCYTES RELATIVE PERCENT: 0.8 % (ref 2–12)
MONOCYTES RELATIVE PERCENT: 0.9 % (ref 2–12)
MONOCYTES RELATIVE PERCENT: 1.6 % (ref 2–12)
MONOCYTES RELATIVE PERCENT: 2 % (ref 2–12)
MONOCYTES RELATIVE PERCENT: 3.1 % (ref 2–12)
MONOCYTES RELATIVE PERCENT: 3.5 % (ref 2–12)
MONOCYTES RELATIVE PERCENT: 4.4 % (ref 2–12)
MONOCYTES RELATIVE PERCENT: 4.4 % (ref 2–12)
MONOCYTES RELATIVE PERCENT: 4.5 % (ref 2–12)
MONOCYTES RELATIVE PERCENT: 5 % (ref 2–12)
MONOCYTES RELATIVE PERCENT: 5.1 % (ref 2–12)
MONOCYTES RELATIVE PERCENT: 5.5 % (ref 2–12)
MONOCYTES RELATIVE PERCENT: 6 % (ref 2–12)
MYELOCYTE PERCENT: 0.9 % (ref 0–0)
MYELOCYTE PERCENT: 2.6 % (ref 0–0)
NEISSERIA MENINGITIDIS BY PCR: NOT DETECTED
NEUTROPHILS ABSOLUTE: 16.62 E9/L (ref 1.8–7.3)
NEUTROPHILS ABSOLUTE: 19.27 E9/L (ref 1.8–7.3)
NEUTROPHILS ABSOLUTE: 2.24 E9/L (ref 1.8–7.3)
NEUTROPHILS ABSOLUTE: 2.74 E9/L (ref 1.8–7.3)
NEUTROPHILS ABSOLUTE: 3.43 E9/L (ref 1.8–7.3)
NEUTROPHILS ABSOLUTE: 3.94 E9/L (ref 1.8–7.3)
NEUTROPHILS ABSOLUTE: 31.14 E9/L (ref 1.8–7.3)
NEUTROPHILS ABSOLUTE: 4.01 E9/L (ref 1.8–7.3)
NEUTROPHILS ABSOLUTE: 4.13 E9/L (ref 1.8–7.3)
NEUTROPHILS ABSOLUTE: 4.46 E9/L (ref 1.8–7.3)
NEUTROPHILS ABSOLUTE: 6.88 E9/L (ref 1.8–7.3)
NEUTROPHILS ABSOLUTE: 7.32 E9/L (ref 1.8–7.3)
NEUTROPHILS ABSOLUTE: 7.35 E9/L (ref 1.8–7.3)
NEUTROPHILS RELATIVE PERCENT: 37 % (ref 43–80)
NEUTROPHILS RELATIVE PERCENT: 39 % (ref 43–80)
NEUTROPHILS RELATIVE PERCENT: 41 % (ref 43–80)
NEUTROPHILS RELATIVE PERCENT: 45.6 % (ref 43–80)
NEUTROPHILS RELATIVE PERCENT: 47.5 % (ref 43–80)
NEUTROPHILS RELATIVE PERCENT: 59.9 % (ref 43–80)
NEUTROPHILS RELATIVE PERCENT: 66.2 % (ref 43–80)
NEUTROPHILS RELATIVE PERCENT: 67.5 % (ref 43–80)
NEUTROPHILS RELATIVE PERCENT: 72.6 % (ref 43–80)
NEUTROPHILS RELATIVE PERCENT: 76.5 % (ref 43–80)
NEUTROPHILS RELATIVE PERCENT: 85 % (ref 43–80)
NEUTROPHILS RELATIVE PERCENT: 93.9 % (ref 43–80)
NEUTROPHILS RELATIVE PERCENT: 94.8 % (ref 43–80)
NITRITE, URINE: POSITIVE
NITRITE, URINE: POSITIVE
NUCLEATED RED BLOOD CELLS: 0.9 /100 WBC
NUCLEATED RED BLOOD CELLS: 0.9 /100 WBC
O2 CONTENT: 10.5 ML/DL
O2 CONTENT: 13.1 ML/DL
O2 SATURATION: 96.5 % (ref 92–98.5)
O2 SATURATION: 99 % (ref 92–98.5)
O2HB: 95.5 % (ref 94–97)
O2HB: 98.3 % (ref 94–97)
OPERATOR ID: 2860
OPERATOR ID: 8217
ORDER NUMBER: ABNORMAL
ORGANISM: ABNORMAL
OVALOCYTES: ABNORMAL
PATHOLOGIST REVIEW: NORMAL
PATIENT TEMP: 37 C
PATIENT TEMP: 37 C
PCO2: 28.5 MMHG (ref 35–45)
PCO2: 34.4 MMHG (ref 35–45)
PDW BLD-RTO: 15.7 FL (ref 11.5–15)
PDW BLD-RTO: 15.8 FL (ref 11.5–15)
PDW BLD-RTO: 16.3 FL (ref 11.5–15)
PDW BLD-RTO: 16.3 FL (ref 11.5–15)
PDW BLD-RTO: 16.4 FL (ref 11.5–15)
PDW BLD-RTO: 16.5 FL (ref 11.5–15)
PDW BLD-RTO: 16.6 FL (ref 11.5–15)
PDW BLD-RTO: 16.6 FL (ref 11.5–15)
PDW BLD-RTO: 16.7 FL (ref 11.5–15)
PDW BLD-RTO: 16.8 FL (ref 11.5–15)
PDW BLD-RTO: 17.1 FL (ref 11.5–15)
PDW BLD-RTO: 17.2 FL (ref 11.5–15)
PDW BLD-RTO: 17.3 FL (ref 11.5–15)
PDW BLD-RTO: 17.4 FL (ref 11.5–15)
PH BLOOD GAS: 7.45 (ref 7.35–7.45)
PH BLOOD GAS: 7.53 (ref 7.35–7.45)
PH UA: 7 (ref 5–9)
PH UA: 8.5 (ref 5–9)
PHOSPHORUS: 1.7 MG/DL (ref 2.5–4.5)
PHOSPHORUS: 1.8 MG/DL (ref 2.5–4.5)
PLATELET # BLD: 107 E9/L (ref 130–450)
PLATELET # BLD: 116 E9/L (ref 130–450)
PLATELET # BLD: 120 E9/L (ref 130–450)
PLATELET # BLD: 23 E9/L (ref 130–450)
PLATELET # BLD: 26 E9/L (ref 130–450)
PLATELET # BLD: 29 E9/L (ref 130–450)
PLATELET # BLD: 37 E9/L (ref 130–450)
PLATELET # BLD: 47 E9/L (ref 130–450)
PLATELET # BLD: 70 E9/L (ref 130–450)
PLATELET # BLD: 76 E9/L (ref 130–450)
PLATELET # BLD: 87 E9/L (ref 130–450)
PLATELET CONFIRMATION: NORMAL
PMV BLD AUTO: ABNORMAL FL (ref 7–12)
PO2: 170.1 MMHG (ref 75–100)
PO2: 90 MMHG (ref 75–100)
POIKILOCYTES: ABNORMAL
POLYCHROMASIA: ABNORMAL
POTASSIUM REFLEX MAGNESIUM: 3.1 MMOL/L (ref 3.5–5)
POTASSIUM REFLEX MAGNESIUM: 3.3 MMOL/L (ref 3.5–5)
POTASSIUM REFLEX MAGNESIUM: 3.5 MMOL/L (ref 3.5–5)
POTASSIUM REFLEX MAGNESIUM: 3.6 MMOL/L (ref 3.5–5)
POTASSIUM REFLEX MAGNESIUM: 3.6 MMOL/L (ref 3.5–5)
POTASSIUM REFLEX MAGNESIUM: 3.7 MMOL/L (ref 3.5–5)
POTASSIUM REFLEX MAGNESIUM: 3.9 MMOL/L (ref 3.5–5)
POTASSIUM REFLEX MAGNESIUM: 4.3 MMOL/L (ref 3.5–5)
POTASSIUM REFLEX MAGNESIUM: 4.8 MMOL/L (ref 3.5–5)
POTASSIUM REFLEX MAGNESIUM: 4.8 MMOL/L (ref 3.5–5)
POTASSIUM REFLEX MAGNESIUM: 4.9 MMOL/L (ref 3.5–5)
POTASSIUM SERPL-SCNC: 2.9 MMOL/L (ref 3.5–5)
POTASSIUM SERPL-SCNC: 3.6 MMOL/L (ref 3.5–5)
POTASSIUM SERPL-SCNC: 4.1 MMOL/L (ref 3.5–5)
POTASSIUM SERPL-SCNC: 4.3 MMOL/L (ref 3.5–5)
POTASSIUM SERPL-SCNC: 4.8 MMOL/L (ref 3.5–5)
POTASSIUM SERPL-SCNC: 5.7 MMOL/L (ref 3.5–5)
PRO-BNP: 3788 PG/ML (ref 0–125)
PROCALCITONIN: 0.89 NG/ML (ref 0–0.08)
PROTEIN UA: >=300 MG/DL
PROTEIN UA: >=300 MG/DL
PROTEUS SPECIES BY PCR: NOT DETECTED
PROTHROMBIN TIME: 11.6 SEC (ref 9.3–12.4)
PSEUDOMONAS AERUGINOSA BY PCR: NOT DETECTED
RBC # BLD: 1.7 E12/L (ref 3.5–5.5)
RBC # BLD: 2.43 E12/L (ref 3.5–5.5)
RBC # BLD: 2.63 E12/L (ref 3.5–5.5)
RBC # BLD: 2.71 E12/L (ref 3.5–5.5)
RBC # BLD: 2.78 E12/L (ref 3.5–5.5)
RBC # BLD: 2.9 E12/L (ref 3.5–5.5)
RBC # BLD: 2.9 E12/L (ref 3.5–5.5)
RBC # BLD: 2.94 E12/L (ref 3.5–5.5)
RBC # BLD: 2.97 E12/L (ref 3.5–5.5)
RBC # BLD: 3 E12/L (ref 3.5–5.5)
RBC # BLD: 3.9 E12/L (ref 3.5–5.5)
RBC # BLD: 4.06 E12/L (ref 3.5–5.5)
RBC # BLD: 4.1 E12/L (ref 3.5–5.5)
RBC # BLD: 4.84 E12/L (ref 3.5–5.5)
RBC # BLD: NORMAL 10*6/UL
RBC UA: ABNORMAL /HPF (ref 0–2)
RBC UA: ABNORMAL /HPF (ref 0–2)
REASON FOR REJECTION: NORMAL
REJECTED TEST: NORMAL
REPORT: NORMAL
RETIC HGB EQUIVALENT: 30.8 PG (ref 28.2–36.6)
RETICULOCYTE ABSOLUTE COUNT: 0.01 E12/L
RETICULOCYTE COUNT PCT: 0.2 % (ref 0.4–1.9)
RHEUMATOID FACTOR: 11 IU/ML (ref 0–13)
SALMONELLA SPECIES BY PCR: NOT DETECTED
SARS-COV-2, NAAT: NOT DETECTED
SCHISTOCYTES: ABNORMAL
SERRATIA MARCESCENS BY PCR: NOT DETECTED
SODIUM BLD-SCNC: 135 MMOL/L (ref 132–146)
SODIUM BLD-SCNC: 136 MMOL/L (ref 132–146)
SODIUM BLD-SCNC: 138 MMOL/L (ref 132–146)
SODIUM BLD-SCNC: 139 MMOL/L (ref 132–146)
SODIUM BLD-SCNC: 140 MMOL/L (ref 132–146)
SODIUM BLD-SCNC: 141 MMOL/L (ref 132–146)
SODIUM BLD-SCNC: 141 MMOL/L (ref 132–146)
SODIUM BLD-SCNC: 142 MMOL/L (ref 132–146)
SODIUM BLD-SCNC: 142 MMOL/L (ref 132–146)
SODIUM BLD-SCNC: 144 MMOL/L (ref 132–146)
SODIUM BLD-SCNC: 144 MMOL/L (ref 132–146)
SODIUM BLD-SCNC: 145 MMOL/L (ref 132–146)
SODIUM BLD-SCNC: 146 MMOL/L (ref 132–146)
SODIUM BLD-SCNC: 147 MMOL/L (ref 132–146)
SOURCE OF BLOOD CULTURE: ABNORMAL
SOURCE, BLOOD GAS: ABNORMAL
SOURCE, BLOOD GAS: ABNORMAL
SPECIFIC GRAVITY UA: 1.01 (ref 1–1.03)
SPECIFIC GRAVITY UA: 1.01 (ref 1–1.03)
STAPHYLOCOCCUS AUREUS BY PCR: NOT DETECTED
STAPHYLOCOCCUS EPIDERMIDIS BY PCR: NOT DETECTED
STAPHYLOCOCCUS LUGDUNENSIS BY PCR: NOT DETECTED
STAPHYLOCOCCUS SPECIES BY PCR: NOT DETECTED
STENOTROPHOMONAS MALTOPHILIA BY PCR: NOT DETECTED
STREPTOCOCCUS AGALACTIAE BY PCR: NOT DETECTED
STREPTOCOCCUS PNEUMONIAE BY PCR: NOT DETECTED
STREPTOCOCCUS PYOGENES  BY PCR: NOT DETECTED
STREPTOCOCCUS SPECIES BY PCR: NOT DETECTED
TARGET CELLS: ABNORMAL
TARGET CELLS: ABNORMAL
TEAR DROP CELLS: ABNORMAL
THB: 7.7 G/DL (ref 11.5–16.5)
THB: 9.2 G/DL (ref 11.5–16.5)
THIS TEST SENT TO: NORMAL
TIME ANALYZED: 1032
TIME ANALYZED: 2345
TOTAL IRON BINDING CAPACITY: ABNORMAL MCG/DL (ref 250–450)
TOTAL PROTEIN: 4.1 G/DL (ref 6.4–8.3)
TOTAL PROTEIN: 4.2 G/DL (ref 6.4–8.3)
TOTAL PROTEIN: 4.3 G/DL (ref 6.4–8.3)
TOTAL PROTEIN: 4.5 G/DL (ref 6.4–8.3)
TOTAL PROTEIN: 4.6 G/DL (ref 6.4–8.3)
TOTAL PROTEIN: 5.1 G/DL (ref 6.4–8.3)
TOTAL PROTEIN: 6.2 G/DL (ref 6.4–8.3)
TOTAL PROTEIN: 6.7 G/DL (ref 6.4–8.3)
TOTAL PROTEIN: 7.2 G/DL (ref 6.4–8.3)
TOXIC GRANULATION: ABNORMAL
TROPONIN, HIGH SENSITIVITY: 23 NG/L (ref 0–9)
TROPONIN, HIGH SENSITIVITY: 28 NG/L (ref 0–9)
TROPONIN, HIGH SENSITIVITY: 28 NG/L (ref 0–9)
TROPONIN, HIGH SENSITIVITY: 29 NG/L (ref 0–9)
TSH SERPL DL<=0.05 MIU/L-ACNC: 2.86 UIU/ML (ref 0.27–4.2)
URINE CULTURE, ROUTINE: ABNORMAL
URINE CULTURE, ROUTINE: ABNORMAL
URINE CULTURE, ROUTINE: NORMAL
URINE CULTURE, ROUTINE: NORMAL
UROBILINOGEN, URINE: 1 E.U./DL
UROBILINOGEN, URINE: 1 E.U./DL
VITAMIN B-12: 965 PG/ML (ref 211–946)
WBC # BLD: 10.4 E9/L (ref 4.5–11.5)
WBC # BLD: 12.3 E9/L (ref 4.5–11.5)
WBC # BLD: 19.5 E9/L (ref 4.5–11.5)
WBC # BLD: 20.5 E9/L (ref 4.5–11.5)
WBC # BLD: 26.3 E9/L (ref 4.5–11.5)
WBC # BLD: 32.1 E9/L (ref 4.5–11.5)
WBC # BLD: 5.4 E9/L (ref 4.5–11.5)
WBC # BLD: 5.7 E9/L (ref 4.5–11.5)
WBC # BLD: 5.9 E9/L (ref 4.5–11.5)
WBC # BLD: 7.4 E9/L (ref 4.5–11.5)
WBC # BLD: 8.4 E9/L (ref 4.5–11.5)
WBC # BLD: 8.4 E9/L (ref 4.5–11.5)
WBC # BLD: 9.5 E9/L (ref 4.5–11.5)
WBC # BLD: 9.8 E9/L (ref 4.5–11.5)
WBC UA: >20 /HPF (ref 0–5)
WBC UA: ABNORMAL /HPF (ref 0–5)
WOUND/ABSCESS: ABNORMAL
WOUND/ABSCESS: ABNORMAL
ZINC: 30.1 UG/DL (ref 60–120)

## 2022-01-01 PROCEDURE — 86901 BLOOD TYPING SEROLOGIC RH(D): CPT

## 2022-01-01 PROCEDURE — 74176 CT ABD & PELVIS W/O CONTRAST: CPT

## 2022-01-01 PROCEDURE — 93306 TTE W/DOPPLER COMPLETE: CPT

## 2022-01-01 PROCEDURE — 6360000002 HC RX W HCPCS: Performed by: INTERNAL MEDICINE

## 2022-01-01 PROCEDURE — 80053 COMPREHEN METABOLIC PANEL: CPT

## 2022-01-01 PROCEDURE — 82140 ASSAY OF AMMONIA: CPT

## 2022-01-01 PROCEDURE — 83605 ASSAY OF LACTIC ACID: CPT

## 2022-01-01 PROCEDURE — 83540 ASSAY OF IRON: CPT

## 2022-01-01 PROCEDURE — 99233 SBSQ HOSP IP/OBS HIGH 50: CPT | Performed by: INTERNAL MEDICINE

## 2022-01-01 PROCEDURE — 6360000002 HC RX W HCPCS: Performed by: STUDENT IN AN ORGANIZED HEALTH CARE EDUCATION/TRAINING PROGRAM

## 2022-01-01 PROCEDURE — 85025 COMPLETE CBC W/AUTO DIFF WBC: CPT

## 2022-01-01 PROCEDURE — 87186 SC STD MICRODIL/AGAR DIL: CPT

## 2022-01-01 PROCEDURE — 92611 MOTION FLUOROSCOPY/SWALLOW: CPT | Performed by: SPEECH-LANGUAGE PATHOLOGIST

## 2022-01-01 PROCEDURE — 36430 TRANSFUSION BLD/BLD COMPNT: CPT

## 2022-01-01 PROCEDURE — 81001 URINALYSIS AUTO W/SCOPE: CPT

## 2022-01-01 PROCEDURE — 82962 GLUCOSE BLOOD TEST: CPT

## 2022-01-01 PROCEDURE — 6370000000 HC RX 637 (ALT 250 FOR IP): Performed by: FAMILY MEDICINE

## 2022-01-01 PROCEDURE — 2500000003 HC RX 250 WO HCPCS: Performed by: INTERNAL MEDICINE

## 2022-01-01 PROCEDURE — 85018 HEMOGLOBIN: CPT

## 2022-01-01 PROCEDURE — 2060000000 HC ICU INTERMEDIATE R&B

## 2022-01-01 PROCEDURE — 6370000000 HC RX 637 (ALT 250 FOR IP): Performed by: INTERNAL MEDICINE

## 2022-01-01 PROCEDURE — P9016 RBC LEUKOCYTES REDUCED: HCPCS

## 2022-01-01 PROCEDURE — 85610 PROTHROMBIN TIME: CPT

## 2022-01-01 PROCEDURE — 2580000003 HC RX 258: Performed by: INTERNAL MEDICINE

## 2022-01-01 PROCEDURE — 86900 BLOOD TYPING SEROLOGIC ABO: CPT

## 2022-01-01 PROCEDURE — 85027 COMPLETE CBC AUTOMATED: CPT

## 2022-01-01 PROCEDURE — 96365 THER/PROPH/DIAG IV INF INIT: CPT

## 2022-01-01 PROCEDURE — 36600 WITHDRAWAL OF ARTERIAL BLOOD: CPT

## 2022-01-01 PROCEDURE — 85014 HEMATOCRIT: CPT

## 2022-01-01 PROCEDURE — 36415 COLL VENOUS BLD VENIPUNCTURE: CPT

## 2022-01-01 PROCEDURE — 99285 EMERGENCY DEPT VISIT HI MDM: CPT

## 2022-01-01 PROCEDURE — 6370000000 HC RX 637 (ALT 250 FOR IP): Performed by: EMERGENCY MEDICINE

## 2022-01-01 PROCEDURE — 6370000000 HC RX 637 (ALT 250 FOR IP): Performed by: STUDENT IN AN ORGANIZED HEALTH CARE EDUCATION/TRAINING PROGRAM

## 2022-01-01 PROCEDURE — 80048 BASIC METABOLIC PNL TOTAL CA: CPT

## 2022-01-01 PROCEDURE — 99222 1ST HOSP IP/OBS MODERATE 55: CPT | Performed by: PSYCHIATRY & NEUROLOGY

## 2022-01-01 PROCEDURE — 87150 DNA/RNA AMPLIFIED PROBE: CPT

## 2022-01-01 PROCEDURE — 87088 URINE BACTERIA CULTURE: CPT

## 2022-01-01 PROCEDURE — 87070 CULTURE OTHR SPECIMN AEROBIC: CPT

## 2022-01-01 PROCEDURE — 93005 ELECTROCARDIOGRAM TRACING: CPT

## 2022-01-01 PROCEDURE — 83615 LACTATE (LD) (LDH) ENZYME: CPT

## 2022-01-01 PROCEDURE — C9113 INJ PANTOPRAZOLE SODIUM, VIA: HCPCS | Performed by: INTERNAL MEDICINE

## 2022-01-01 PROCEDURE — 2580000003 HC RX 258

## 2022-01-01 PROCEDURE — 51702 INSERT TEMP BLADDER CATH: CPT

## 2022-01-01 PROCEDURE — 84484 ASSAY OF TROPONIN QUANT: CPT

## 2022-01-01 PROCEDURE — 97530 THERAPEUTIC ACTIVITIES: CPT

## 2022-01-01 PROCEDURE — 83735 ASSAY OF MAGNESIUM: CPT

## 2022-01-01 PROCEDURE — 36569 INSJ PICC 5 YR+ W/O IMAGING: CPT

## 2022-01-01 PROCEDURE — 6360000004 HC RX CONTRAST MEDICATION

## 2022-01-01 PROCEDURE — 2580000003 HC RX 258: Performed by: OBSTETRICS & GYNECOLOGY

## 2022-01-01 PROCEDURE — 92610 EVALUATE SWALLOWING FUNCTION: CPT

## 2022-01-01 PROCEDURE — 2580000003 HC RX 258: Performed by: EMERGENCY MEDICINE

## 2022-01-01 PROCEDURE — 84100 ASSAY OF PHOSPHORUS: CPT

## 2022-01-01 PROCEDURE — 86850 RBC ANTIBODY SCREEN: CPT

## 2022-01-01 PROCEDURE — 87040 BLOOD CULTURE FOR BACTERIA: CPT

## 2022-01-01 PROCEDURE — 95819 EEG AWAKE AND ASLEEP: CPT

## 2022-01-01 PROCEDURE — 97166 OT EVAL MOD COMPLEX 45 MIN: CPT

## 2022-01-01 PROCEDURE — 82607 VITAMIN B-12: CPT

## 2022-01-01 PROCEDURE — 83880 ASSAY OF NATRIURETIC PEPTIDE: CPT

## 2022-01-01 PROCEDURE — 84630 ASSAY OF ZINC: CPT

## 2022-01-01 PROCEDURE — 96361 HYDRATE IV INFUSION ADD-ON: CPT

## 2022-01-01 PROCEDURE — 86038 ANTINUCLEAR ANTIBODIES: CPT

## 2022-01-01 PROCEDURE — 86880 COOMBS TEST DIRECT: CPT

## 2022-01-01 PROCEDURE — 99222 1ST HOSP IP/OBS MODERATE 55: CPT | Performed by: CLINICAL NURSE SPECIALIST

## 2022-01-01 PROCEDURE — 96374 THER/PROPH/DIAG INJ IV PUSH: CPT

## 2022-01-01 PROCEDURE — 02HV33Z INSERTION OF INFUSION DEVICE INTO SUPERIOR VENA CAVA, PERCUTANEOUS APPROACH: ICD-10-PCS | Performed by: INTERNAL MEDICINE

## 2022-01-01 PROCEDURE — 74230 X-RAY XM SWLNG FUNCJ C+: CPT

## 2022-01-01 PROCEDURE — 81270 JAK2 GENE: CPT

## 2022-01-01 PROCEDURE — 93010 ELECTROCARDIOGRAM REPORT: CPT | Performed by: INTERNAL MEDICINE

## 2022-01-01 PROCEDURE — 6360000002 HC RX W HCPCS: Performed by: FAMILY MEDICINE

## 2022-01-01 PROCEDURE — 88237 TISSUE CULTURE BONE MARROW: CPT

## 2022-01-01 PROCEDURE — 87077 CULTURE AEROBIC IDENTIFY: CPT

## 2022-01-01 PROCEDURE — 70450 CT HEAD/BRAIN W/O DYE: CPT

## 2022-01-01 PROCEDURE — 88112 CYTOPATH CELL ENHANCE TECH: CPT

## 2022-01-01 PROCEDURE — 88184 FLOWCYTOMETRY/ TC 1 MARKER: CPT

## 2022-01-01 PROCEDURE — 82746 ASSAY OF FOLIC ACID SERUM: CPT

## 2022-01-01 PROCEDURE — 99223 1ST HOSP IP/OBS HIGH 75: CPT | Performed by: INTERNAL MEDICINE

## 2022-01-01 PROCEDURE — 71046 X-RAY EXAM CHEST 2 VIEWS: CPT

## 2022-01-01 PROCEDURE — 99284 EMERGENCY DEPT VISIT MOD MDM: CPT

## 2022-01-01 PROCEDURE — 85045 AUTOMATED RETICULOCYTE COUNT: CPT

## 2022-01-01 PROCEDURE — 82805 BLOOD GASES W/O2 SATURATION: CPT

## 2022-01-01 PROCEDURE — 92526 ORAL FUNCTION THERAPY: CPT

## 2022-01-01 PROCEDURE — 86431 RHEUMATOID FACTOR QUANT: CPT

## 2022-01-01 PROCEDURE — 2580000003 HC RX 258: Performed by: STUDENT IN AN ORGANIZED HEALTH CARE EDUCATION/TRAINING PROGRAM

## 2022-01-01 PROCEDURE — 6360000002 HC RX W HCPCS: Performed by: OBSTETRICS & GYNECOLOGY

## 2022-01-01 PROCEDURE — 82330 ASSAY OF CALCIUM: CPT

## 2022-01-01 PROCEDURE — 2000000000 HC ICU R&B

## 2022-01-01 PROCEDURE — 97162 PT EVAL MOD COMPLEX 30 MIN: CPT

## 2022-01-01 PROCEDURE — 84443 ASSAY THYROID STIM HORMONE: CPT

## 2022-01-01 PROCEDURE — 84145 PROCALCITONIN (PCT): CPT

## 2022-01-01 PROCEDURE — 71250 CT THORAX DX C-: CPT

## 2022-01-01 PROCEDURE — 88374 M/PHMTRC ALYS ISHQUANT/SEMIQ: CPT

## 2022-01-01 PROCEDURE — 84132 ASSAY OF SERUM POTASSIUM: CPT

## 2022-01-01 PROCEDURE — 2580000003 HC RX 258: Performed by: FAMILY MEDICINE

## 2022-01-01 PROCEDURE — 88291 CYTO/MOLECULAR REPORT: CPT

## 2022-01-01 PROCEDURE — 88185 FLOWCYTOMETRY/TC ADD-ON: CPT

## 2022-01-01 PROCEDURE — 93005 ELECTROCARDIOGRAM TRACING: CPT | Performed by: STUDENT IN AN ORGANIZED HEALTH CARE EDUCATION/TRAINING PROGRAM

## 2022-01-01 PROCEDURE — 80074 ACUTE HEPATITIS PANEL: CPT

## 2022-01-01 PROCEDURE — C1751 CATH, INF, PER/CENT/MIDLINE: HCPCS

## 2022-01-01 PROCEDURE — 82728 ASSAY OF FERRITIN: CPT

## 2022-01-01 PROCEDURE — 2500000003 HC RX 250 WO HCPCS: Performed by: OBSTETRICS & GYNECOLOGY

## 2022-01-01 PROCEDURE — 71045 X-RAY EXAM CHEST 1 VIEW: CPT

## 2022-01-01 PROCEDURE — 82525 ASSAY OF COPPER: CPT

## 2022-01-01 PROCEDURE — 86923 COMPATIBILITY TEST ELECTRIC: CPT

## 2022-01-01 PROCEDURE — 86703 HIV-1/HIV-2 1 RESULT ANTBDY: CPT

## 2022-01-01 PROCEDURE — 83550 IRON BINDING TEST: CPT

## 2022-01-01 PROCEDURE — 85730 THROMBOPLASTIN TIME PARTIAL: CPT

## 2022-01-01 PROCEDURE — 83090 ASSAY OF HOMOCYSTEINE: CPT

## 2022-01-01 PROCEDURE — 87635 SARS-COV-2 COVID-19 AMP PRB: CPT

## 2022-01-01 PROCEDURE — 76937 US GUIDE VASCULAR ACCESS: CPT

## 2022-01-01 PROCEDURE — 1200000000 HC SEMI PRIVATE

## 2022-01-01 PROCEDURE — 83010 ASSAY OF HAPTOGLOBIN QUANT: CPT

## 2022-01-01 RX ORDER — ACETAMINOPHEN 650 MG/1
650 SUPPOSITORY RECTAL EVERY 6 HOURS PRN
Status: DISCONTINUED | OUTPATIENT
Start: 2022-01-01 | End: 2022-01-01 | Stop reason: HOSPADM

## 2022-01-01 RX ORDER — POTASSIUM CHLORIDE 7.45 MG/ML
10 INJECTION INTRAVENOUS
Status: COMPLETED | OUTPATIENT
Start: 2022-01-01 | End: 2022-01-01

## 2022-01-01 RX ORDER — HEPARIN SODIUM 10000 [USP'U]/ML
5000 INJECTION, SOLUTION INTRAVENOUS; SUBCUTANEOUS EVERY 8 HOURS SCHEDULED
Status: DISCONTINUED | OUTPATIENT
Start: 2022-01-01 | End: 2022-01-01 | Stop reason: HOSPADM

## 2022-01-01 RX ORDER — PANTOPRAZOLE SODIUM 40 MG/1
40 TABLET, DELAYED RELEASE ORAL
Status: DISCONTINUED | OUTPATIENT
Start: 2022-01-01 | End: 2022-01-01

## 2022-01-01 RX ORDER — SODIUM CHLORIDE 0.9 % (FLUSH) 0.9 %
5-40 SYRINGE (ML) INJECTION PRN
Status: DISCONTINUED | OUTPATIENT
Start: 2022-01-01 | End: 2022-01-01 | Stop reason: HOSPADM

## 2022-01-01 RX ORDER — ACETAMINOPHEN 325 MG/1
650 TABLET ORAL EVERY 6 HOURS PRN
Status: DISCONTINUED | OUTPATIENT
Start: 2022-01-01 | End: 2022-01-01 | Stop reason: HOSPADM

## 2022-01-01 RX ORDER — 0.9 % SODIUM CHLORIDE 0.9 %
1000 INTRAVENOUS SOLUTION INTRAVENOUS ONCE
Status: COMPLETED | OUTPATIENT
Start: 2022-01-01 | End: 2022-01-01

## 2022-01-01 RX ORDER — EPINEPHRINE 0.1 MG/ML
SYRINGE (ML) INJECTION
Status: COMPLETED | OUTPATIENT
Start: 2022-01-01 | End: 2022-01-01

## 2022-01-01 RX ORDER — PANTOPRAZOLE SODIUM 40 MG/10ML
40 INJECTION, POWDER, LYOPHILIZED, FOR SOLUTION INTRAVENOUS 2 TIMES DAILY
Status: DISCONTINUED | OUTPATIENT
Start: 2022-01-01 | End: 2022-01-01 | Stop reason: HOSPADM

## 2022-01-01 RX ORDER — IPRATROPIUM BROMIDE AND ALBUTEROL SULFATE 2.5; .5 MG/3ML; MG/3ML
1 SOLUTION RESPIRATORY (INHALATION) EVERY 4 HOURS PRN
Status: DISCONTINUED | OUTPATIENT
Start: 2022-01-01 | End: 2022-01-01 | Stop reason: HOSPADM

## 2022-01-01 RX ORDER — TROSPIUM CHLORIDE 20 MG/1
20 TABLET, FILM COATED ORAL NIGHTLY
Status: DISCONTINUED | OUTPATIENT
Start: 2022-01-01 | End: 2022-01-01 | Stop reason: HOSPADM

## 2022-01-01 RX ORDER — SODIUM CHLORIDE 9 MG/ML
INJECTION, SOLUTION INTRAVENOUS PRN
Status: DISCONTINUED | OUTPATIENT
Start: 2022-01-01 | End: 2022-01-01 | Stop reason: HOSPADM

## 2022-01-01 RX ORDER — MAGNESIUM SULFATE HEPTAHYDRATE 500 MG/ML
INJECTION, SOLUTION INTRAMUSCULAR; INTRAVENOUS
Status: COMPLETED | OUTPATIENT
Start: 2022-01-01 | End: 2022-01-01

## 2022-01-01 RX ORDER — AMIODARONE HYDROCHLORIDE 50 MG/ML
INJECTION, SOLUTION INTRAVENOUS
Status: COMPLETED | OUTPATIENT
Start: 2022-01-01 | End: 2022-01-01

## 2022-01-01 RX ORDER — OXYBUTYNIN CHLORIDE 10 MG/1
10 TABLET, EXTENDED RELEASE ORAL DAILY
Status: DISCONTINUED | OUTPATIENT
Start: 2022-01-01 | End: 2022-01-01 | Stop reason: HOSPADM

## 2022-01-01 RX ORDER — ERGOCALCIFEROL 1.25 MG/1
50000 CAPSULE ORAL
Status: DISCONTINUED | OUTPATIENT
Start: 2022-01-01 | End: 2022-01-01 | Stop reason: HOSPADM

## 2022-01-01 RX ORDER — HEPARIN SODIUM (PORCINE) LOCK FLUSH IV SOLN 100 UNIT/ML 100 UNIT/ML
3 SOLUTION INTRAVENOUS PRN
Status: DISCONTINUED | OUTPATIENT
Start: 2022-01-01 | End: 2022-01-01 | Stop reason: HOSPADM

## 2022-01-01 RX ORDER — MAGNESIUM OXIDE 400 MG/1
400 TABLET ORAL DAILY
COMMUNITY

## 2022-01-01 RX ORDER — SODIUM CHLORIDE 0.9 % (FLUSH) 0.9 %
5-40 SYRINGE (ML) INJECTION EVERY 12 HOURS SCHEDULED
Status: DISCONTINUED | OUTPATIENT
Start: 2022-01-01 | End: 2022-01-01 | Stop reason: HOSPADM

## 2022-01-01 RX ORDER — POTASSIUM CHLORIDE 20 MEQ/1
40 TABLET, EXTENDED RELEASE ORAL ONCE
Status: COMPLETED | OUTPATIENT
Start: 2022-01-01 | End: 2022-01-01

## 2022-01-01 RX ORDER — ENOXAPARIN SODIUM 100 MG/ML
40 INJECTION SUBCUTANEOUS DAILY
Status: DISCONTINUED | OUTPATIENT
Start: 2022-01-01 | End: 2022-01-01 | Stop reason: HOSPADM

## 2022-01-01 RX ORDER — SODIUM PHOSPHATE, DIBASIC AND SODIUM PHOSPHATE, MONOBASIC 7; 19 G/133ML; G/133ML
1 ENEMA RECTAL
COMMUNITY

## 2022-01-01 RX ORDER — SODIUM CHLORIDE 0.9 % (FLUSH) 0.9 %
5-40 SYRINGE (ML) INJECTION PRN
Status: DISCONTINUED | OUTPATIENT
Start: 2022-01-01 | End: 2022-01-01 | Stop reason: SDUPTHER

## 2022-01-01 RX ORDER — SODIUM CHLORIDE 9 MG/ML
INJECTION, SOLUTION INTRAVENOUS CONTINUOUS PRN
Status: COMPLETED | OUTPATIENT
Start: 2022-01-01 | End: 2022-01-01

## 2022-01-01 RX ORDER — SENNA PLUS 8.6 MG/1
1 TABLET ORAL DAILY
Status: DISCONTINUED | OUTPATIENT
Start: 2022-01-01 | End: 2022-01-01 | Stop reason: HOSPADM

## 2022-01-01 RX ORDER — POLYETHYLENE GLYCOL 3350 17 G/17G
17 POWDER, FOR SOLUTION ORAL DAILY PRN
Status: DISCONTINUED | OUTPATIENT
Start: 2022-01-01 | End: 2022-01-01 | Stop reason: HOSPADM

## 2022-01-01 RX ORDER — FLUTICASONE PROPIONATE 50 MCG
2 SPRAY, SUSPENSION (ML) NASAL DAILY
Status: DISCONTINUED | OUTPATIENT
Start: 2022-01-01 | End: 2022-01-01 | Stop reason: HOSPADM

## 2022-01-01 RX ORDER — DEXTROSE AND SODIUM CHLORIDE 5; .45 G/100ML; G/100ML
INJECTION, SOLUTION INTRAVENOUS CONTINUOUS
Status: DISCONTINUED | OUTPATIENT
Start: 2022-01-01 | End: 2022-01-01

## 2022-01-01 RX ORDER — CEFDINIR 300 MG/1
300 CAPSULE ORAL 2 TIMES DAILY
Qty: 10 CAPSULE | Refills: 0 | Status: SHIPPED | OUTPATIENT
Start: 2022-01-01 | End: 2022-01-01

## 2022-01-01 RX ORDER — SODIUM CHLORIDE 9 MG/ML
INJECTION, SOLUTION INTRAVENOUS PRN
Status: DISCONTINUED | OUTPATIENT
Start: 2022-01-01 | End: 2022-01-01 | Stop reason: SDUPTHER

## 2022-01-01 RX ORDER — FERROUS SULFATE 325(65) MG
325 TABLET ORAL EVERY OTHER DAY
COMMUNITY

## 2022-01-01 RX ORDER — SODIUM CHLORIDE 0.9 % (FLUSH) 0.9 %
5-40 SYRINGE (ML) INJECTION EVERY 12 HOURS SCHEDULED
Status: DISCONTINUED | OUTPATIENT
Start: 2022-01-01 | End: 2022-01-01 | Stop reason: SDUPTHER

## 2022-01-01 RX ORDER — ATORVASTATIN CALCIUM 10 MG/1
10 TABLET, FILM COATED ORAL DAILY
Status: DISCONTINUED | OUTPATIENT
Start: 2022-01-01 | End: 2022-01-01 | Stop reason: HOSPADM

## 2022-01-01 RX ORDER — MIRTAZAPINE 15 MG/1
7.5 TABLET, FILM COATED ORAL NIGHTLY
COMMUNITY

## 2022-01-01 RX ORDER — LANOLIN ALCOHOL/MO/W.PET/CERES
6 CREAM (GRAM) TOPICAL NIGHTLY
COMMUNITY

## 2022-01-01 RX ORDER — 0.9 % SODIUM CHLORIDE 0.9 %
250 INTRAVENOUS SOLUTION INTRAVENOUS ONCE
Status: COMPLETED | OUTPATIENT
Start: 2022-01-01 | End: 2022-01-01

## 2022-01-01 RX ORDER — ACETAMINOPHEN 650 MG/1
650 SUPPOSITORY RECTAL EVERY 6 HOURS PRN
Status: DISCONTINUED | OUTPATIENT
Start: 2022-01-01 | End: 2022-01-01 | Stop reason: SDUPTHER

## 2022-01-01 RX ORDER — FUROSEMIDE 40 MG/1
40 TABLET ORAL DAILY
Status: DISCONTINUED | OUTPATIENT
Start: 2022-01-01 | End: 2022-01-01 | Stop reason: HOSPADM

## 2022-01-01 RX ORDER — HEPARIN SODIUM (PORCINE) LOCK FLUSH IV SOLN 100 UNIT/ML 100 UNIT/ML
1 SOLUTION INTRAVENOUS EVERY 12 HOURS SCHEDULED
Status: DISCONTINUED | OUTPATIENT
Start: 2022-01-01 | End: 2022-01-01 | Stop reason: HOSPADM

## 2022-01-01 RX ORDER — CLOPIDOGREL BISULFATE 75 MG/1
75 TABLET ORAL DAILY
Status: DISCONTINUED | OUTPATIENT
Start: 2022-01-01 | End: 2022-01-01 | Stop reason: HOSPADM

## 2022-01-01 RX ORDER — FOLIC ACID 5 MG/ML
1 INJECTION, SOLUTION INTRAMUSCULAR; INTRAVENOUS; SUBCUTANEOUS DAILY
Status: DISCONTINUED | OUTPATIENT
Start: 2022-01-01 | End: 2022-01-01 | Stop reason: HOSPADM

## 2022-01-01 RX ORDER — DEXTROSE, SODIUM CHLORIDE, AND POTASSIUM CHLORIDE 5; .45; .15 G/100ML; G/100ML; G/100ML
INJECTION INTRAVENOUS CONTINUOUS
Status: DISCONTINUED | OUTPATIENT
Start: 2022-01-01 | End: 2022-01-01

## 2022-01-01 RX ORDER — SODIUM CHLORIDE 9 MG/ML
INJECTION, SOLUTION INTRAVENOUS ONCE
Status: COMPLETED | OUTPATIENT
Start: 2022-01-01 | End: 2022-01-01

## 2022-01-01 RX ORDER — ONDANSETRON 4 MG/1
4 TABLET, ORALLY DISINTEGRATING ORAL EVERY 8 HOURS PRN
Status: DISCONTINUED | OUTPATIENT
Start: 2022-01-01 | End: 2022-01-01

## 2022-01-01 RX ORDER — ONDANSETRON 2 MG/ML
4 INJECTION INTRAMUSCULAR; INTRAVENOUS EVERY 6 HOURS PRN
Status: DISCONTINUED | OUTPATIENT
Start: 2022-01-01 | End: 2022-01-01 | Stop reason: HOSPADM

## 2022-01-01 RX ORDER — MAGNESIUM SULFATE 1 G/100ML
1000 INJECTION INTRAVENOUS ONCE
Status: COMPLETED | OUTPATIENT
Start: 2022-01-01 | End: 2022-01-01

## 2022-01-01 RX ORDER — ONDANSETRON 2 MG/ML
4 INJECTION INTRAMUSCULAR; INTRAVENOUS EVERY 6 HOURS PRN
Status: DISCONTINUED | OUTPATIENT
Start: 2022-01-01 | End: 2022-01-01

## 2022-01-01 RX ORDER — LANOLIN ALCOHOL/MO/W.PET/CERES
400 CREAM (GRAM) TOPICAL DAILY
Status: DISCONTINUED | OUTPATIENT
Start: 2022-01-01 | End: 2022-01-01 | Stop reason: HOSPADM

## 2022-01-01 RX ORDER — MIDODRINE HYDROCHLORIDE 5 MG/1
5 TABLET ORAL
Status: DISCONTINUED | OUTPATIENT
Start: 2022-01-01 | End: 2022-01-01 | Stop reason: HOSPADM

## 2022-01-01 RX ORDER — FERROUS SULFATE 325(65) MG
325 TABLET ORAL EVERY OTHER DAY
Status: DISCONTINUED | OUTPATIENT
Start: 2022-01-01 | End: 2022-01-01 | Stop reason: HOSPADM

## 2022-01-01 RX ORDER — ACETAMINOPHEN 325 MG/1
650 TABLET ORAL EVERY 6 HOURS PRN
Status: DISCONTINUED | OUTPATIENT
Start: 2022-01-01 | End: 2022-01-01 | Stop reason: SDUPTHER

## 2022-01-01 RX ORDER — ZINC SULFATE 50(220)MG
50 CAPSULE ORAL DAILY
Status: DISCONTINUED | OUTPATIENT
Start: 2022-01-01 | End: 2022-01-01 | Stop reason: HOSPADM

## 2022-01-01 RX ORDER — TROSPIUM CHLORIDE 20 MG/1
20 TABLET, FILM COATED ORAL NIGHTLY
Refills: 5 | Status: DISCONTINUED | OUTPATIENT
Start: 2022-01-01 | End: 2022-01-01 | Stop reason: HOSPADM

## 2022-01-01 RX ORDER — 0.9 % SODIUM CHLORIDE 0.9 %
500 INTRAVENOUS SOLUTION INTRAVENOUS ONCE
Status: COMPLETED | OUTPATIENT
Start: 2022-01-01 | End: 2022-01-01

## 2022-01-01 RX ORDER — PANTOPRAZOLE SODIUM 40 MG/1
40 TABLET, DELAYED RELEASE ORAL
Status: DISCONTINUED | OUTPATIENT
Start: 2022-01-01 | End: 2022-01-01 | Stop reason: HOSPADM

## 2022-01-01 RX ORDER — FLUTICASONE PROPIONATE 50 MCG
2 SPRAY, SUSPENSION (ML) NASAL DAILY
COMMUNITY

## 2022-01-01 RX ORDER — ONDANSETRON 4 MG/1
4 TABLET, ORALLY DISINTEGRATING ORAL EVERY 8 HOURS PRN
Status: DISCONTINUED | OUTPATIENT
Start: 2022-01-01 | End: 2022-01-01 | Stop reason: HOSPADM

## 2022-01-01 RX ORDER — HEPARIN SODIUM (PORCINE) LOCK FLUSH IV SOLN 100 UNIT/ML 100 UNIT/ML
3 SOLUTION INTRAVENOUS EVERY 12 HOURS SCHEDULED
Status: DISCONTINUED | OUTPATIENT
Start: 2022-01-01 | End: 2022-01-01 | Stop reason: HOSPADM

## 2022-01-01 RX ORDER — PANTOPRAZOLE SODIUM 40 MG/10ML
40 INJECTION, POWDER, LYOPHILIZED, FOR SOLUTION INTRAVENOUS DAILY
Status: DISCONTINUED | OUTPATIENT
Start: 2022-01-01 | End: 2022-01-01

## 2022-01-01 RX ORDER — HEPARIN SODIUM (PORCINE) LOCK FLUSH IV SOLN 100 UNIT/ML 100 UNIT/ML
1 SOLUTION INTRAVENOUS PRN
Status: DISCONTINUED | OUTPATIENT
Start: 2022-01-01 | End: 2022-01-01 | Stop reason: HOSPADM

## 2022-01-01 RX ORDER — CYANOCOBALAMIN 1000 UG/ML
1000 INJECTION INTRAMUSCULAR; SUBCUTANEOUS
COMMUNITY

## 2022-01-01 RX ORDER — LIDOCAINE HYDROCHLORIDE 10 MG/ML
5 INJECTION, SOLUTION EPIDURAL; INFILTRATION; INTRACAUDAL; PERINEURAL ONCE
Status: COMPLETED | OUTPATIENT
Start: 2022-01-01 | End: 2022-01-01

## 2022-01-01 RX ORDER — VALSARTAN 80 MG/1
80 TABLET ORAL DAILY
COMMUNITY
End: 2022-01-01 | Stop reason: HOSPADM

## 2022-01-01 RX ORDER — SODIUM CHLORIDE 9 MG/ML
25 INJECTION, SOLUTION INTRAVENOUS PRN
Status: DISCONTINUED | OUTPATIENT
Start: 2022-01-01 | End: 2022-01-01 | Stop reason: HOSPADM

## 2022-01-01 RX ORDER — SODIUM CHLORIDE 9 MG/ML
INJECTION, SOLUTION INTRAVENOUS PRN
Status: DISCONTINUED | OUTPATIENT
Start: 2022-01-01 | End: 2022-01-01

## 2022-01-01 RX ORDER — ERGOCALCIFEROL (VITAMIN D2) 1250 MCG
50000 CAPSULE ORAL WEEKLY
COMMUNITY

## 2022-01-01 RX ORDER — MIDODRINE HYDROCHLORIDE 10 MG/1
10 TABLET ORAL ONCE
Status: COMPLETED | OUTPATIENT
Start: 2022-01-01 | End: 2022-01-01

## 2022-01-01 RX ADMIN — PANTOPRAZOLE SODIUM 40 MG: 40 INJECTION, POWDER, FOR SOLUTION INTRAVENOUS at 20:28

## 2022-01-01 RX ADMIN — PANTOPRAZOLE SODIUM 40 MG: 40 INJECTION, POWDER, FOR SOLUTION INTRAVENOUS at 09:18

## 2022-01-01 RX ADMIN — PANTOPRAZOLE SODIUM 40 MG: 40 INJECTION, POWDER, FOR SOLUTION INTRAVENOUS at 09:15

## 2022-01-01 RX ADMIN — TROSPIUM CHLORIDE 20 MG: 20 TABLET, FILM COATED ORAL at 21:09

## 2022-01-01 RX ADMIN — PETROLATUM: 420 OINTMENT TOPICAL at 20:13

## 2022-01-01 RX ADMIN — EPINEPHRINE 1 MG: 0.1 INJECTION INTRACARDIAC; INTRAVENOUS at 06:25

## 2022-01-01 RX ADMIN — POTASSIUM CHLORIDE 10 MEQ: 7.46 INJECTION, SOLUTION INTRAVENOUS at 08:19

## 2022-01-01 RX ADMIN — ERTAPENEM SODIUM 1000 MG: 1 INJECTION, POWDER, LYOPHILIZED, FOR SOLUTION INTRAMUSCULAR; INTRAVENOUS at 12:05

## 2022-01-01 RX ADMIN — ATORVASTATIN CALCIUM 10 MG: 10 TABLET, FILM COATED ORAL at 10:11

## 2022-01-01 RX ADMIN — SODIUM CHLORIDE, PRESERVATIVE FREE 10 ML: 5 INJECTION INTRAVENOUS at 20:34

## 2022-01-01 RX ADMIN — FOLIC ACID 1 MG: 5 INJECTION, SOLUTION INTRAMUSCULAR; INTRAVENOUS; SUBCUTANEOUS at 11:02

## 2022-01-01 RX ADMIN — POTASSIUM CHLORIDE, DEXTROSE MONOHYDRATE AND SODIUM CHLORIDE: 150; 5; 450 INJECTION, SOLUTION INTRAVENOUS at 13:19

## 2022-01-01 RX ADMIN — SODIUM CHLORIDE, PRESERVATIVE FREE 300 UNITS: 5 INJECTION INTRAVENOUS at 11:53

## 2022-01-01 RX ADMIN — MICONAZOLE NITRATE: 20.6 POWDER TOPICAL at 14:31

## 2022-01-01 RX ADMIN — ERTAPENEM SODIUM 1000 MG: 1 INJECTION, POWDER, LYOPHILIZED, FOR SOLUTION INTRAMUSCULAR; INTRAVENOUS at 10:04

## 2022-01-01 RX ADMIN — PANTOPRAZOLE SODIUM 40 MG: 40 INJECTION, POWDER, FOR SOLUTION INTRAVENOUS at 09:17

## 2022-01-01 RX ADMIN — MICONAZOLE NITRATE: 20.6 POWDER TOPICAL at 10:46

## 2022-01-01 RX ADMIN — MAGNESIUM SULFATE HEPTAHYDRATE 1000 MG: 500 INJECTION, SOLUTION INTRAMUSCULAR; INTRAVENOUS at 06:29

## 2022-01-01 RX ADMIN — Medication 100 UNITS: at 09:18

## 2022-01-01 RX ADMIN — Medication 100 UNITS: at 20:36

## 2022-01-01 RX ADMIN — TROSPIUM CHLORIDE 20 MG: 20 TABLET, FILM COATED ORAL at 21:26

## 2022-01-01 RX ADMIN — SODIUM CHLORIDE, PRESERVATIVE FREE 10 ML: 5 INJECTION INTRAVENOUS at 20:28

## 2022-01-01 RX ADMIN — ERTAPENEM SODIUM 1000 MG: 1 INJECTION, POWDER, LYOPHILIZED, FOR SOLUTION INTRAMUSCULAR; INTRAVENOUS at 15:00

## 2022-01-01 RX ADMIN — FOLIC ACID 1 MG: 5 INJECTION, SOLUTION INTRAMUSCULAR; INTRAVENOUS; SUBCUTANEOUS at 10:40

## 2022-01-01 RX ADMIN — DEXTROSE AND SODIUM CHLORIDE: 5; 450 INJECTION, SOLUTION INTRAVENOUS at 05:12

## 2022-01-01 RX ADMIN — PETROLATUM: 420 OINTMENT TOPICAL at 10:44

## 2022-01-01 RX ADMIN — FLUTICASONE PROPIONATE 2 SPRAY: 50 SPRAY, METERED NASAL at 09:17

## 2022-01-01 RX ADMIN — SODIUM CHLORIDE, PRESERVATIVE FREE 10 ML: 5 INJECTION INTRAVENOUS at 11:07

## 2022-01-01 RX ADMIN — SODIUM CHLORIDE 1000 ML: 9 INJECTION, SOLUTION INTRAVENOUS at 15:53

## 2022-01-01 RX ADMIN — PETROLATUM: 420 OINTMENT TOPICAL at 11:06

## 2022-01-01 RX ADMIN — EPINEPHRINE 1 MG: 0.1 INJECTION INTRACARDIAC; INTRAVENOUS at 06:13

## 2022-01-01 RX ADMIN — FLUTICASONE PROPIONATE 2 SPRAY: 50 SPRAY, METERED NASAL at 08:39

## 2022-01-01 RX ADMIN — SODIUM CHLORIDE, PRESERVATIVE FREE 10 ML: 5 INJECTION INTRAVENOUS at 21:38

## 2022-01-01 RX ADMIN — PETROLATUM: 420 OINTMENT TOPICAL at 19:49

## 2022-01-01 RX ADMIN — Medication 100 UNITS: at 20:51

## 2022-01-01 RX ADMIN — Medication 100 UNITS: at 11:03

## 2022-01-01 RX ADMIN — PETROLATUM: 420 OINTMENT TOPICAL at 19:50

## 2022-01-01 RX ADMIN — SODIUM CHLORIDE: 9 INJECTION, SOLUTION INTRAVENOUS at 00:56

## 2022-01-01 RX ADMIN — OXYBUTYNIN CHLORIDE 10 MG: 10 TABLET, EXTENDED RELEASE ORAL at 10:11

## 2022-01-01 RX ADMIN — FLUTICASONE PROPIONATE 2 SPRAY: 50 SPRAY, METERED NASAL at 21:26

## 2022-01-01 RX ADMIN — POTASSIUM PHOSPHATE, MONOBASIC AND POTASSIUM PHOSPHATE, DIBASIC 15 MMOL: 224; 236 INJECTION, SOLUTION, CONCENTRATE INTRAVENOUS at 08:25

## 2022-01-01 RX ADMIN — PETROLATUM: 420 OINTMENT TOPICAL at 20:30

## 2022-01-01 RX ADMIN — ATORVASTATIN CALCIUM 10 MG: 10 TABLET, FILM COATED ORAL at 08:37

## 2022-01-01 RX ADMIN — PANTOPRAZOLE SODIUM 40 MG: 40 INJECTION, POWDER, FOR SOLUTION INTRAVENOUS at 20:36

## 2022-01-01 RX ADMIN — PANTOPRAZOLE SODIUM 40 MG: 40 TABLET, DELAYED RELEASE ORAL at 05:24

## 2022-01-01 RX ADMIN — PANTOPRAZOLE SODIUM 40 MG: 40 INJECTION, POWDER, FOR SOLUTION INTRAVENOUS at 20:11

## 2022-01-01 RX ADMIN — DEXTROSE AND SODIUM CHLORIDE: 5; 200 INJECTION, SOLUTION INTRAVENOUS at 12:50

## 2022-01-01 RX ADMIN — ATORVASTATIN CALCIUM 10 MG: 10 TABLET, FILM COATED ORAL at 21:26

## 2022-01-01 RX ADMIN — TROSPIUM CHLORIDE 20 MG: 20 TABLET, FILM COATED ORAL at 21:16

## 2022-01-01 RX ADMIN — SODIUM CHLORIDE, PRESERVATIVE FREE 10 ML: 5 INJECTION INTRAVENOUS at 22:07

## 2022-01-01 RX ADMIN — EPINEPHRINE 1 MG: 0.1 INJECTION INTRACARDIAC; INTRAVENOUS at 06:34

## 2022-01-01 RX ADMIN — SERTRALINE 50 MG: 50 TABLET, FILM COATED ORAL at 17:42

## 2022-01-01 RX ADMIN — SERTRALINE 50 MG: 50 TABLET, FILM COATED ORAL at 10:11

## 2022-01-01 RX ADMIN — MICONAZOLE NITRATE: 20.6 POWDER TOPICAL at 20:34

## 2022-01-01 RX ADMIN — ERTAPENEM SODIUM 1000 MG: 1 INJECTION, POWDER, LYOPHILIZED, FOR SOLUTION INTRAMUSCULAR; INTRAVENOUS at 11:08

## 2022-01-01 RX ADMIN — WATER 1000 MG: 1 INJECTION INTRAMUSCULAR; INTRAVENOUS; SUBCUTANEOUS at 16:00

## 2022-01-01 RX ADMIN — POTASSIUM BICARBONATE 20 MEQ: 782 TABLET, EFFERVESCENT ORAL at 08:38

## 2022-01-01 RX ADMIN — SODIUM CHLORIDE, PRESERVATIVE FREE 300 UNITS: 5 INJECTION INTRAVENOUS at 20:11

## 2022-01-01 RX ADMIN — PANTOPRAZOLE SODIUM 40 MG: 40 INJECTION, POWDER, FOR SOLUTION INTRAVENOUS at 11:02

## 2022-01-01 RX ADMIN — SODIUM CHLORIDE, PRESERVATIVE FREE 10 ML: 5 INJECTION INTRAVENOUS at 16:25

## 2022-01-01 RX ADMIN — EPINEPHRINE 1 MG: 0.1 INJECTION INTRACARDIAC; INTRAVENOUS at 06:22

## 2022-01-01 RX ADMIN — SODIUM CHLORIDE 1000 ML: 9 INJECTION, SOLUTION INTRAVENOUS at 08:29

## 2022-01-01 RX ADMIN — SERTRALINE 50 MG: 50 TABLET, FILM COATED ORAL at 08:46

## 2022-01-01 RX ADMIN — PETROLATUM: 420 OINTMENT TOPICAL at 21:17

## 2022-01-01 RX ADMIN — ERTAPENEM SODIUM 1000 MG: 1 INJECTION, POWDER, LYOPHILIZED, FOR SOLUTION INTRAMUSCULAR; INTRAVENOUS at 10:30

## 2022-01-01 RX ADMIN — ENOXAPARIN SODIUM 40 MG: 100 INJECTION SUBCUTANEOUS at 21:26

## 2022-01-01 RX ADMIN — SODIUM CHLORIDE, PRESERVATIVE FREE 10 ML: 5 INJECTION INTRAVENOUS at 09:18

## 2022-01-01 RX ADMIN — Medication 400 MG: at 08:38

## 2022-01-01 RX ADMIN — Medication 325 MG: at 21:26

## 2022-01-01 RX ADMIN — SODIUM CHLORIDE, PRESERVATIVE FREE 10 ML: 5 INJECTION INTRAVENOUS at 08:38

## 2022-01-01 RX ADMIN — POTASSIUM CHLORIDE 10 MEQ: 7.46 INJECTION, SOLUTION INTRAVENOUS at 05:10

## 2022-01-01 RX ADMIN — FLUTICASONE PROPIONATE 2 SPRAY: 50 SPRAY, METERED NASAL at 09:18

## 2022-01-01 RX ADMIN — POTASSIUM BICARBONATE 20 MEQ: 782 TABLET, EFFERVESCENT ORAL at 21:26

## 2022-01-01 RX ADMIN — SENNOSIDES 8.6 MG: 8.6 TABLET, COATED ORAL at 08:46

## 2022-01-01 RX ADMIN — SODIUM CHLORIDE, PRESERVATIVE FREE 300 UNITS: 5 INJECTION INTRAVENOUS at 20:21

## 2022-01-01 RX ADMIN — SODIUM CHLORIDE, PRESERVATIVE FREE 300 UNITS: 5 INJECTION INTRAVENOUS at 20:33

## 2022-01-01 RX ADMIN — PETROLATUM: 420 OINTMENT TOPICAL at 14:31

## 2022-01-01 RX ADMIN — Medication 100 UNITS: at 21:35

## 2022-01-01 RX ADMIN — POTASSIUM CHLORIDE, DEXTROSE MONOHYDRATE AND SODIUM CHLORIDE: 150; 5; 450 INJECTION, SOLUTION INTRAVENOUS at 01:35

## 2022-01-01 RX ADMIN — PETROLATUM: 420 OINTMENT TOPICAL at 11:40

## 2022-01-01 RX ADMIN — EPINEPHRINE 1 MG: 0.1 INJECTION INTRACARDIAC; INTRAVENOUS at 06:19

## 2022-01-01 RX ADMIN — SODIUM CHLORIDE, PRESERVATIVE FREE 10 ML: 5 INJECTION INTRAVENOUS at 20:11

## 2022-01-01 RX ADMIN — EPINEPHRINE 1 MG: 0.1 INJECTION INTRACARDIAC; INTRAVENOUS at 06:37

## 2022-01-01 RX ADMIN — PANTOPRAZOLE SODIUM 40 MG: 40 INJECTION, POWDER, FOR SOLUTION INTRAVENOUS at 20:51

## 2022-01-01 RX ADMIN — PANTOPRAZOLE SODIUM 40 MG: 40 INJECTION, POWDER, FOR SOLUTION INTRAVENOUS at 21:38

## 2022-01-01 RX ADMIN — SODIUM CHLORIDE, PRESERVATIVE FREE 10 ML: 5 INJECTION INTRAVENOUS at 20:13

## 2022-01-01 RX ADMIN — Medication 400 MG: at 21:26

## 2022-01-01 RX ADMIN — Medication 100 UNITS: at 10:42

## 2022-01-01 RX ADMIN — FUROSEMIDE 40 MG: 40 TABLET ORAL at 08:37

## 2022-01-01 RX ADMIN — FOLIC ACID 1 MG: 5 INJECTION, SOLUTION INTRAMUSCULAR; INTRAVENOUS; SUBCUTANEOUS at 09:15

## 2022-01-01 RX ADMIN — MICONAZOLE NITRATE: 20.6 POWDER TOPICAL at 09:16

## 2022-01-01 RX ADMIN — SODIUM CHLORIDE, PRESERVATIVE FREE 300 UNITS: 5 INJECTION INTRAVENOUS at 21:37

## 2022-01-01 RX ADMIN — ATORVASTATIN CALCIUM 10 MG: 10 TABLET, FILM COATED ORAL at 08:46

## 2022-01-01 RX ADMIN — PETROLATUM: 420 OINTMENT TOPICAL at 08:39

## 2022-01-01 RX ADMIN — SENNOSIDES 8.6 MG: 8.6 TABLET, COATED ORAL at 10:11

## 2022-01-01 RX ADMIN — ERTAPENEM SODIUM 1000 MG: 1 INJECTION, POWDER, LYOPHILIZED, FOR SOLUTION INTRAMUSCULAR; INTRAVENOUS at 11:18

## 2022-01-01 RX ADMIN — MICONAZOLE NITRATE: 20.6 POWDER TOPICAL at 08:39

## 2022-01-01 RX ADMIN — FLUTICASONE PROPIONATE 2 SPRAY: 50 SPRAY, METERED NASAL at 11:07

## 2022-01-01 RX ADMIN — MICONAZOLE NITRATE: 20.6 POWDER TOPICAL at 22:06

## 2022-01-01 RX ADMIN — AMIODARONE HYDROCHLORIDE 300 MG: 50 INJECTION, SOLUTION INTRAVENOUS at 06:26

## 2022-01-01 RX ADMIN — FUROSEMIDE 40 MG: 40 TABLET ORAL at 21:26

## 2022-01-01 RX ADMIN — LIDOCAINE HYDROCHLORIDE 5 ML: 10 INJECTION, SOLUTION EPIDURAL; INFILTRATION; INTRACAUDAL; PERINEURAL at 11:53

## 2022-01-01 RX ADMIN — POTASSIUM CHLORIDE 10 MEQ: 7.46 INJECTION, SOLUTION INTRAVENOUS at 02:32

## 2022-01-01 RX ADMIN — POTASSIUM CHLORIDE, DEXTROSE MONOHYDRATE AND SODIUM CHLORIDE: 150; 5; 450 INJECTION, SOLUTION INTRAVENOUS at 23:12

## 2022-01-01 RX ADMIN — SODIUM CHLORIDE 1000 ML/HR: 9 INJECTION, SOLUTION INTRAVENOUS at 06:12

## 2022-01-01 RX ADMIN — SODIUM CHLORIDE, PRESERVATIVE FREE 10 ML: 5 INJECTION INTRAVENOUS at 21:27

## 2022-01-01 RX ADMIN — EPINEPHRINE 1 MG: 0.1 INJECTION INTRACARDIAC; INTRAVENOUS at 06:31

## 2022-01-01 RX ADMIN — Medication 100 UNITS: at 11:11

## 2022-01-01 RX ADMIN — ERTAPENEM SODIUM 1000 MG: 1 INJECTION, POWDER, LYOPHILIZED, FOR SOLUTION INTRAMUSCULAR; INTRAVENOUS at 11:51

## 2022-01-01 RX ADMIN — PETROLATUM: 420 OINTMENT TOPICAL at 09:17

## 2022-01-01 RX ADMIN — PANTOPRAZOLE SODIUM 40 MG: 40 INJECTION, POWDER, FOR SOLUTION INTRAVENOUS at 08:38

## 2022-01-01 RX ADMIN — EPINEPHRINE 1 MG: 0.1 INJECTION INTRACARDIAC; INTRAVENOUS at 06:16

## 2022-01-01 RX ADMIN — POTASSIUM CHLORIDE 10 MEQ: 7.46 INJECTION, SOLUTION INTRAVENOUS at 07:18

## 2022-01-01 RX ADMIN — PANTOPRAZOLE SODIUM 40 MG: 40 INJECTION, POWDER, FOR SOLUTION INTRAVENOUS at 20:21

## 2022-01-01 RX ADMIN — Medication 100 UNITS: at 22:05

## 2022-01-01 RX ADMIN — FLUTICASONE PROPIONATE 2 SPRAY: 50 SPRAY, METERED NASAL at 11:47

## 2022-01-01 RX ADMIN — OXYBUTYNIN CHLORIDE 10 MG: 10 TABLET, EXTENDED RELEASE ORAL at 08:46

## 2022-01-01 RX ADMIN — EPINEPHRINE 1 MG: 0.1 INJECTION INTRACARDIAC; INTRAVENOUS at 06:28

## 2022-01-01 RX ADMIN — OXYBUTYNIN CHLORIDE 10 MG: 10 TABLET, EXTENDED RELEASE ORAL at 08:38

## 2022-01-01 RX ADMIN — FOLIC ACID 1 MG: 5 INJECTION, SOLUTION INTRAMUSCULAR; INTRAVENOUS; SUBCUTANEOUS at 11:15

## 2022-01-01 RX ADMIN — PETROLATUM: 420 OINTMENT TOPICAL at 09:20

## 2022-01-01 RX ADMIN — PETROLATUM: 420 OINTMENT TOPICAL at 11:46

## 2022-01-01 RX ADMIN — SODIUM CHLORIDE: 9 INJECTION, SOLUTION INTRAVENOUS at 20:49

## 2022-01-01 RX ADMIN — ERTAPENEM SODIUM 1000 MG: 1 INJECTION, POWDER, LYOPHILIZED, FOR SOLUTION INTRAMUSCULAR; INTRAVENOUS at 11:12

## 2022-01-01 RX ADMIN — CLOPIDOGREL BISULFATE 75 MG: 75 TABLET ORAL at 08:46

## 2022-01-01 RX ADMIN — PETROLATUM: 420 OINTMENT TOPICAL at 08:47

## 2022-01-01 RX ADMIN — SODIUM CHLORIDE, PRESERVATIVE FREE 10 ML: 5 INJECTION INTRAVENOUS at 09:19

## 2022-01-01 RX ADMIN — SODIUM CHLORIDE 1000 ML: 9 INJECTION, SOLUTION INTRAVENOUS at 06:04

## 2022-01-01 RX ADMIN — POTASSIUM CHLORIDE 10 MEQ: 7.46 INJECTION, SOLUTION INTRAVENOUS at 01:05

## 2022-01-01 RX ADMIN — POTASSIUM CHLORIDE 10 MEQ: 7.46 INJECTION, SOLUTION INTRAVENOUS at 06:11

## 2022-01-01 RX ADMIN — CEFEPIME 2000 MG: 2 INJECTION, POWDER, FOR SOLUTION INTRAVENOUS at 08:14

## 2022-01-01 RX ADMIN — PETROLATUM: 420 OINTMENT TOPICAL at 11:18

## 2022-01-01 RX ADMIN — Medication 100 UNITS: at 08:38

## 2022-01-01 RX ADMIN — SODIUM CHLORIDE, PRESERVATIVE FREE 10 ML: 5 INJECTION INTRAVENOUS at 22:05

## 2022-01-01 RX ADMIN — CLOPIDOGREL BISULFATE 75 MG: 75 TABLET ORAL at 21:26

## 2022-01-01 RX ADMIN — SODIUM CHLORIDE 250 ML: 9 INJECTION, SOLUTION INTRAVENOUS at 01:14

## 2022-01-01 RX ADMIN — SODIUM CHLORIDE 500 ML: 9 INJECTION, SOLUTION INTRAVENOUS at 16:23

## 2022-01-01 RX ADMIN — CALCIUM GLUCONATE 1000 MG: 98 INJECTION, SOLUTION INTRAVENOUS at 12:47

## 2022-01-01 RX ADMIN — PANTOPRAZOLE SODIUM 40 MG: 40 INJECTION, POWDER, FOR SOLUTION INTRAVENOUS at 10:39

## 2022-01-01 RX ADMIN — PANTOPRAZOLE SODIUM 40 MG: 40 TABLET, DELAYED RELEASE ORAL at 06:17

## 2022-01-01 RX ADMIN — CLOPIDOGREL BISULFATE 75 MG: 75 TABLET ORAL at 08:38

## 2022-01-01 RX ADMIN — MAGNESIUM SULFATE HEPTAHYDRATE 1000 MG: 1 INJECTION, SOLUTION INTRAVENOUS at 05:16

## 2022-01-01 RX ADMIN — SODIUM CHLORIDE, PRESERVATIVE FREE 10 ML: 5 INJECTION INTRAVENOUS at 21:16

## 2022-01-01 RX ADMIN — FOLIC ACID 1 MG: 5 INJECTION, SOLUTION INTRAMUSCULAR; INTRAVENOUS; SUBCUTANEOUS at 09:59

## 2022-01-01 RX ADMIN — SODIUM CHLORIDE, PRESERVATIVE FREE 10 ML: 5 INJECTION INTRAVENOUS at 17:42

## 2022-01-01 RX ADMIN — FOLIC ACID 1 MG: 5 INJECTION, SOLUTION INTRAMUSCULAR; INTRAVENOUS; SUBCUTANEOUS at 17:47

## 2022-01-01 RX ADMIN — SERTRALINE 50 MG: 50 TABLET, FILM COATED ORAL at 08:38

## 2022-01-01 RX ADMIN — PETROLATUM: 420 OINTMENT TOPICAL at 22:40

## 2022-01-01 RX ADMIN — SODIUM CHLORIDE, PRESERVATIVE FREE 10 ML: 5 INJECTION INTRAVENOUS at 20:21

## 2022-01-01 RX ADMIN — FLUTICASONE PROPIONATE 2 SPRAY: 50 SPRAY, METERED NASAL at 08:40

## 2022-01-01 RX ADMIN — LIDOCAINE HYDROCHLORIDE 100 MG: 20 INJECTION, SOLUTION INTRAVENOUS at 06:31

## 2022-01-01 RX ADMIN — SODIUM CHLORIDE, PRESERVATIVE FREE 300 UNITS: 5 INJECTION INTRAVENOUS at 22:05

## 2022-01-01 RX ADMIN — CLOPIDOGREL BISULFATE 75 MG: 75 TABLET ORAL at 10:11

## 2022-01-01 RX ADMIN — PANTOPRAZOLE SODIUM 40 MG: 40 INJECTION, POWDER, FOR SOLUTION INTRAVENOUS at 19:46

## 2022-01-01 RX ADMIN — EPINEPHRINE 1 MG: 0.1 INJECTION INTRACARDIAC; INTRAVENOUS at 06:40

## 2022-01-01 RX ADMIN — POTASSIUM CHLORIDE, DEXTROSE MONOHYDRATE AND SODIUM CHLORIDE: 150; 5; 450 INJECTION, SOLUTION INTRAVENOUS at 02:47

## 2022-01-01 RX ADMIN — MICONAZOLE NITRATE: 20.6 POWDER TOPICAL at 08:47

## 2022-01-01 RX ADMIN — SODIUM CHLORIDE 1000 ML: 9 INJECTION, SOLUTION INTRAVENOUS at 13:02

## 2022-01-01 RX ADMIN — WATER 1000 MG: 1 INJECTION INTRAMUSCULAR; INTRAVENOUS; SUBCUTANEOUS at 16:15

## 2022-01-01 RX ADMIN — Medication 100 UNITS: at 20:11

## 2022-01-01 RX ADMIN — PETROLATUM: 420 OINTMENT TOPICAL at 21:36

## 2022-01-01 RX ADMIN — SODIUM CHLORIDE, PRESERVATIVE FREE 10 ML: 5 INJECTION INTRAVENOUS at 10:39

## 2022-01-01 RX ADMIN — POTASSIUM CHLORIDE, DEXTROSE MONOHYDRATE AND SODIUM CHLORIDE: 150; 5; 450 INJECTION, SOLUTION INTRAVENOUS at 22:39

## 2022-01-01 RX ADMIN — SODIUM CHLORIDE, PRESERVATIVE FREE 10 ML: 5 INJECTION INTRAVENOUS at 16:15

## 2022-01-01 RX ADMIN — POTASSIUM CHLORIDE, DEXTROSE MONOHYDRATE AND SODIUM CHLORIDE: 150; 5; 450 INJECTION, SOLUTION INTRAVENOUS at 11:54

## 2022-01-01 RX ADMIN — OXYBUTYNIN CHLORIDE 10 MG: 10 TABLET, EXTENDED RELEASE ORAL at 21:26

## 2022-01-01 RX ADMIN — ATORVASTATIN CALCIUM 10 MG: 10 TABLET, FILM COATED ORAL at 17:41

## 2022-01-01 RX ADMIN — MAGNESIUM SULFATE HEPTAHYDRATE 2000 MG: 500 INJECTION, SOLUTION INTRAMUSCULAR; INTRAVENOUS at 06:23

## 2022-01-01 RX ADMIN — Medication 100 UNITS: at 19:46

## 2022-01-01 RX ADMIN — FLUTICASONE PROPIONATE 2 SPRAY: 50 SPRAY, METERED NASAL at 10:43

## 2022-01-01 RX ADMIN — SODIUM BICARBONATE 100 MEQ: 84 INJECTION, SOLUTION INTRAVENOUS at 06:15

## 2022-01-01 RX ADMIN — PANTOPRAZOLE SODIUM 40 MG: 40 TABLET, DELAYED RELEASE ORAL at 05:43

## 2022-01-01 RX ADMIN — PANTOPRAZOLE SODIUM 40 MG: 40 INJECTION, POWDER, FOR SOLUTION INTRAVENOUS at 11:09

## 2022-01-01 RX ADMIN — SERTRALINE 50 MG: 50 TABLET, FILM COATED ORAL at 21:26

## 2022-01-01 RX ADMIN — FOLIC ACID 1 MG: 5 INJECTION, SOLUTION INTRAMUSCULAR; INTRAVENOUS; SUBCUTANEOUS at 09:18

## 2022-01-01 RX ADMIN — ERTAPENEM SODIUM 1000 MG: 1 INJECTION, POWDER, LYOPHILIZED, FOR SOLUTION INTRAMUSCULAR; INTRAVENOUS at 11:58

## 2022-01-01 RX ADMIN — POTASSIUM CHLORIDE, DEXTROSE MONOHYDRATE AND SODIUM CHLORIDE: 150; 5; 450 INJECTION, SOLUTION INTRAVENOUS at 10:11

## 2022-01-01 RX ADMIN — MIDODRINE HYDROCHLORIDE 10 MG: 10 TABLET ORAL at 20:50

## 2022-01-01 RX ADMIN — POTASSIUM CHLORIDE 40 MEQ: 20 TABLET, EXTENDED RELEASE ORAL at 18:27

## 2022-01-01 RX ADMIN — SODIUM CHLORIDE, PRESERVATIVE FREE 10 ML: 5 INJECTION INTRAVENOUS at 08:46

## 2022-01-01 RX ADMIN — MICONAZOLE NITRATE: 20.6 POWDER TOPICAL at 21:17

## 2022-01-01 RX ADMIN — MICONAZOLE NITRATE: 20.6 POWDER TOPICAL at 05:12

## 2022-01-01 RX ADMIN — SODIUM BICARBONATE 100 MEQ: 84 INJECTION, SOLUTION INTRAVENOUS at 06:34

## 2022-01-01 RX ADMIN — MICONAZOLE NITRATE: 20.6 POWDER TOPICAL at 20:23

## 2022-01-01 RX ADMIN — ENOXAPARIN SODIUM 40 MG: 100 INJECTION SUBCUTANEOUS at 09:17

## 2022-01-01 RX ADMIN — AMIODARONE HYDROCHLORIDE 150 MG: 50 INJECTION, SOLUTION INTRAVENOUS at 06:29

## 2022-01-01 RX ADMIN — SODIUM CHLORIDE, PRESERVATIVE FREE 10 ML: 5 INJECTION INTRAVENOUS at 20:51

## 2022-01-01 RX ADMIN — Medication 100 UNITS: at 20:30

## 2022-01-01 RX ADMIN — SODIUM CHLORIDE, PRESERVATIVE FREE 10 ML: 5 INJECTION INTRAVENOUS at 11:54

## 2022-01-01 RX ADMIN — DEXTROSE AND SODIUM CHLORIDE: 5; 450 INJECTION, SOLUTION INTRAVENOUS at 16:40

## 2022-01-01 RX ADMIN — SODIUM CHLORIDE, PRESERVATIVE FREE 10 ML: 5 INJECTION INTRAVENOUS at 10:11

## 2022-01-01 ASSESSMENT — PAIN SCALES - PAIN ASSESSMENT IN ADVANCED DEMENTIA (PAINAD)
BREATHING: 0
CONSOLABILITY: 0
TOTALSCORE: 1
BREATHING: 0
TOTALSCORE: 0
NEGVOCALIZATION: 1
TOTALSCORE: 0
NEGVOCALIZATION: 0
NEGVOCALIZATION: 0
FACIALEXPRESSION: 0
BODYLANGUAGE: 0
CONSOLABILITY: 0
BODYLANGUAGE: 0
BREATHING: 0
BODYLANGUAGE: 0
TOTALSCORE: 0
BODYLANGUAGE: 0
BODYLANGUAGE: 0
BREATHING: 0
CONSOLABILITY: 0
FACIALEXPRESSION: 0
NEGVOCALIZATION: 0
FACIALEXPRESSION: 0
CONSOLABILITY: 0
TOTALSCORE: 1
NEGVOCALIZATION: 0
FACIALEXPRESSION: 0
FACIALEXPRESSION: 0
CONSOLABILITY: 0
CONSOLABILITY: 0
NEGVOCALIZATION: 0
NEGVOCALIZATION: 0
BODYLANGUAGE: 0
TOTALSCORE: 0
CONSOLABILITY: 0
BREATHING: 0
TOTALSCORE: 0
NEGVOCALIZATION: 1
NEGVOCALIZATION: 1
FACIALEXPRESSION: 0
BODYLANGUAGE: 0
FACIALEXPRESSION: 0
FACIALEXPRESSION: 0
BREATHING: 0
BREATHING: 0
CONSOLABILITY: 0
TOTALSCORE: 0
CONSOLABILITY: 0
TOTALSCORE: 1
FACIALEXPRESSION: 0
BODYLANGUAGE: 0
BODYLANGUAGE: 0

## 2022-01-01 ASSESSMENT — PAIN SCALES - GENERAL
PAINLEVEL_OUTOF10: 0
PAINLEVEL_OUTOF10: 1
PAINLEVEL_OUTOF10: 0
PAINLEVEL_OUTOF10: 0
PAINLEVEL_OUTOF10: 2
PAINLEVEL_OUTOF10: 0
PAINLEVEL_OUTOF10: 2
PAINLEVEL_OUTOF10: 0
PAINLEVEL_OUTOF10: 1
PAINLEVEL_OUTOF10: 0
PAINLEVEL_OUTOF10: 3
PAINLEVEL_OUTOF10: 0
PAINLEVEL_OUTOF10: 1
PAINLEVEL_OUTOF10: 0
PAINLEVEL_OUTOF10: 2
PAINLEVEL_OUTOF10: 0
PAINLEVEL_OUTOF10: 0
PAINLEVEL_OUTOF10: 1
PAINLEVEL_OUTOF10: 1
PAINLEVEL_OUTOF10: 0
PAINLEVEL_OUTOF10: 2
PAINLEVEL_OUTOF10: 0
PAINLEVEL_OUTOF10: 0

## 2022-01-01 ASSESSMENT — ENCOUNTER SYMPTOMS
NAUSEA: 0
WHEEZING: 0
SINUS PRESSURE: 0
EYE DISCHARGE: 0
BACK PAIN: 0
VOMITING: 0
ABDOMINAL PAIN: 0
BACK PAIN: 0
COUGH: 0
DIARRHEA: 0
SHORTNESS OF BREATH: 0
EYE PAIN: 0
DIARRHEA: 0
SORE THROAT: 0
EYE PAIN: 0
SHORTNESS OF BREATH: 0
EYE REDNESS: 0
SORE THROAT: 0
VOMITING: 0
ABDOMINAL DISTENTION: 0
NAUSEA: 0

## 2022-01-01 ASSESSMENT — PAIN SCALES - WONG BAKER
WONGBAKER_NUMERICALRESPONSE: 0

## 2022-01-01 ASSESSMENT — PAIN DESCRIPTION - LOCATION
LOCATION: OTHER (COMMENT)
LOCATION: OTHER (COMMENT)

## 2022-01-01 ASSESSMENT — PAIN DESCRIPTION - DESCRIPTORS
DESCRIPTORS: PATIENT UNABLE TO DESCRIBE
DESCRIPTORS: PATIENT UNABLE TO DESCRIBE

## 2022-03-27 PROBLEM — N17.9 AKI (ACUTE KIDNEY INJURY) (HCC): Status: ACTIVE | Noted: 2022-01-01

## 2022-03-27 PROBLEM — N39.0 SEPSIS DUE TO URINARY TRACT INFECTION (HCC): Status: ACTIVE | Noted: 2022-01-01

## 2022-03-27 PROBLEM — A41.9 SEPSIS DUE TO URINARY TRACT INFECTION (HCC): Status: ACTIVE | Noted: 2022-01-01

## 2022-03-27 NOTE — LETTER
[address] [place] [address] Dr Department Medicaid  CERTIFICATION OF NECESSITY  FOR NON-EMERGENCY TRANSPORTATION   BY GROUND AMBULANCE      Individual Information   1. Name: [facility] 2. [address] [place] [address] Dr Medicaid Billing Number:    3. Address: [facility] [address]      Transportation Provider Information   4. Provider Name:    5. [address] [place] [address] Dr Medicaid Provider Number:  National Provider Identifier (NPI):      Certification  7. Criteria:  During transport, this individual requires:  [x] Medical treatment or continuous     supervision by an EMT. [] The administration or regulation of oxygen by another person. [] Supervised protective restraint. 8. Period Beginning Date: 03/29/22   9. Length  [x] Not more than 10 day(s)  [] One Year     Additional Information Relevant to Certification   10. Comments or Explanations, If Necessary or Appropriate     Stage III buttock ulcer, post polio syndrome, CHF, sepsis     Certifying Practitioner Information   11. Name of Practitioner: Oriana De Jesus MD   12. [address] [place] [address] Dr Medicaid Provider Number, If Applicable:  [address] Provider Identifier (NPI):      Signature Information   14. Date of Signature: 03/29/22 15. Name of Person Signing: [facility]   53. Signature and Professional Designation: Electronically signed by [facility] ASHLEY on 3/29/2022 at 10:29 AM  Discharge planner     [id number]  Rev. 7/2015      [address] [place] [address] Encounter Date/Time: 3/27/2022 [address] [place] [address] Account: [de-identified]    MRN: [id number]    Patient: Srini Wang    Contact Serial #: [id number]      ENCOUNTER          Patient Class: I Private Enc?   No Unit RM BD: SEYZ 4S PICU 4504/4504-B   Hospital Service: MED   Encounter DX: Vaginal bleeding [N93.9]   ADM Provider: Oriana De Jesus MD   Procedure:     ATT Provider: Oriana De Jesus MD   REF Provider:        Admission DX: Vaginal bleeding, SHAWNA (acute kidney injury) ([facility] [address] Sepsis due to urinary tract infection ([facility] [address] and DX codes: N93.9, N17.9, A41.9, N39.0    PATIENT                 Name: Ami Sage : 1955 (79 yrs)   Address: [facility] [address] [place] [address], [address] [facility] Sex: Female   City: [facility] [address]         Marital Status: Single   Employer: DISABLED         Gnosticist: Religious   Primary Care Provider: Milo Caballero MD         Primary Phone: [phone]   EMERGENCY CONTACT   Contact Name Legal Guardian? Relationship to Patient Home Phone Work Phone   1. Chelsea Davalos  2. None,Per Pt      Other  Other [phone]                 GUARANTOR            Guarantor: Ami Sage     : 1955   Address: [facility];[address] Sex: Female     [place] [address]     Relation to Patient: Self       Home Phone: [phone]   Guarantor ID: [id number]       Work Phone:     Guarantor Employer: DISABLED         Status: DISABLED      COVERAGE        PRIMARY INSURANCE   Payor: [employer] Plan: [employer]   Payor Address: [address] [place]is Roberts,  Cholo Pulliam, [address] [place] [address]       Group Number:   Insurance Type: INDEMNITY   Subscriber Name: Nas Johnson : 1955   Subscriber ID: [id number] Pat. Rel. to Sub: Self   SECONDARY INSURANCE   Payor: [address] Address:  [address] [place] [address], [facility]          Group Number: [id number] Type: INDEMNITY   Subscriber Name: Nas Johnson : 1955   Subscriber ID: [id number] Pat.  Rel. to Sub: SELF           CSN: [id number]

## 2022-03-27 NOTE — ED PROVIDER NOTES
HPI     Patient is a 79 y.o. female presents with a chief complaint of vaginal bleeding. Patient is presenting from nursing facility. Patient states she has history of a hysterectomy done last year. She denies any abdominal or vaginal pain. Denies any abdominal or vaginal trauma. Onset is unknown. Patient states she had no idea as she had vaginal bleeding. A nurse noticed the bleeding shortly prior to arrival and advised her to be evaluated at the hospital.  Patient denies any recent episodes of vaginal bleeding. Patient admits to associated dysuria. She denies any fever, chills, nausea, vomiting, chest pain, shortness of breath, abdominal pain, or hematochezia. Review of Systems   Constitutional: Negative for chills and fever. HENT: Negative for ear pain and sore throat. Eyes: Negative for pain. Respiratory: Negative for shortness of breath. Cardiovascular: Negative for chest pain. Gastrointestinal: Negative for abdominal pain, diarrhea, nausea and vomiting. Genitourinary: Positive for dysuria and vaginal bleeding. Negative for flank pain and pelvic pain. Musculoskeletal: Negative for back pain and neck pain. Skin: Negative for rash. Neurological: Negative for headaches. Psychiatric/Behavioral: Negative for behavioral problems. The patient is not nervous/anxious. Physical Exam  Constitutional:       General: She is not in acute distress. Appearance: She is obese. She is not ill-appearing. HENT:      Head: Normocephalic and atraumatic. Right Ear: External ear normal.      Left Ear: External ear normal.      Nose: Nose normal.      Mouth/Throat:      Mouth: Mucous membranes are moist.      Pharynx: No oropharyngeal exudate. Eyes:      Extraocular Movements: Extraocular movements intact. Conjunctiva/sclera: Conjunctivae normal.      Pupils: Pupils are equal, round, and reactive to light.    Cardiovascular:      Rate and Rhythm: Normal rate and regular rhythm. Pulses: Normal pulses. Pulmonary:      Effort: No respiratory distress. Breath sounds: Normal breath sounds. Abdominal:      Palpations: Abdomen is soft. Tenderness: There is no abdominal tenderness. There is no guarding or rebound. Genitourinary:     Comments: Pelvic exam positive for vaginal bleeding  Musculoskeletal:         General: No deformity or signs of injury. Cervical back: Neck supple. No rigidity. Skin:     General: Skin is warm and dry. Capillary Refill: Capillary refill takes less than 2 seconds. Neurological:      General: No focal deficit present. Mental Status: She is alert and oriented to person, place, and time. Mental status is at baseline. Psychiatric:         Mood and Affect: Mood normal.         Behavior: Behavior normal.          Procedures     MDM     ED Course as of 03/1955   Sun Mar 27, 2022   0728 Patient appears to have an SHAWNA, as well as bacteriuria  [JG]   8168 Will admit patient for renal failure, vital signs improved after fluid resuscitation, given cefepime for bacteriuria, CT scan does not show any acute intra-abdominal pathology besides under distention of the bladder [JG]   1010 Dr. Epifanio Cole patient for admission [JG]      ED Course User Index  [JG] Gael Garcia MD      Patient is a 79 y.o. female presenting with vaginal bleeding. Patient has a history of a hysterectomy done last year. She reports no pain at this time. Pelvic exam was significant for a small amount of vaginal bleeding with no obvious lacerations or masses. Lab work shows an SHAWNA, leukocytosis, and bacteriuria. CT abdomen showed no acute process. Patient given cefepime for bacteriuria. Patient admitted to Dr. Burgess Higgins. Her vitals improved with IV fluids.     ED Course as of 03/1955   Mimi Sr Mar 27, 2022   0728 Patient appears to have an SHAWNA, as well as bacteriuria  [JG]   4860 Will admit patient for renal failure, vital signs improved after fluid paternal uncle. The patients home medications have been reviewed. Allergies: Patient has no known allergies.     -------------------------------------------------- RESULTS -------------------------------------------------    LABS:  Results for orders placed or performed during the hospital encounter of 03/27/22   CBC with Auto Differential   Result Value Ref Range    WBC 12.3 (H) 4.5 - 11.5 E9/L    RBC 4.84 3.50 - 5.50 E12/L    Hemoglobin 13.7 11.5 - 15.5 g/dL    Hematocrit 44.0 34.0 - 48.0 %    MCV 90.9 80.0 - 99.9 fL    MCH 28.3 26.0 - 35.0 pg    MCHC 31.1 (L) 32.0 - 34.5 %    RDW 15.7 (H) 11.5 - 15.0 fL    Platelets 875 (L) 663 - 450 E9/L    MPV NOT CALC 7.0 - 12.0 fL    Neutrophils % 59.9 43.0 - 80.0 %    Immature Granulocytes % 1.2 0.0 - 5.0 %    Lymphocytes % 28.5 20.0 - 42.0 %    Monocytes % 6.0 2.0 - 12.0 %    Eosinophils % 3.5 0.0 - 6.0 %    Basophils % 0.9 0.0 - 2.0 %    Neutrophils Absolute 7.35 (H) 1.80 - 7.30 E9/L    Immature Granulocytes # 0.15 E9/L    Lymphocytes Absolute 3.49 1.50 - 4.00 E9/L    Monocytes Absolute 0.73 0.10 - 0.95 E9/L    Eosinophils Absolute 0.43 0.05 - 0.50 E9/L    Basophils Absolute 0.11 0.00 - 0.20 E9/L   Comprehensive Metabolic Panel   Result Value Ref Range    Sodium 135 132 - 146 mmol/L    Potassium 5.7 (H) 3.5 - 5.0 mmol/L    Chloride 99 98 - 107 mmol/L    CO2 21 (L) 22 - 29 mmol/L    Anion Gap 15 7 - 16 mmol/L    Glucose 84 74 - 99 mg/dL    BUN 83 (H) 6 - 23 mg/dL    CREATININE 2.0 (H) 0.5 - 1.0 mg/dL    GFR Non-African American 25 >=60 mL/min/1.73    GFR African American 30     Calcium 9.0 8.6 - 10.2 mg/dL    Total Protein 7.2 6.4 - 8.3 g/dL    Albumin 2.8 (L) 3.5 - 5.2 g/dL    Total Bilirubin 0.5 0.0 - 1.2 mg/dL    Alkaline Phosphatase 148 (H) 35 - 104 U/L    ALT 6 0 - 32 U/L    AST 13 0 - 31 U/L   Urinalysis   Result Value Ref Range    Color, UA BLOODY Straw/Yellow    Clarity, UA TURBID (A) Clear    Glucose, Ur Negative Negative mg/dL    Bilirubin Urine MODERATE (A) Negative    Ketones, Urine TRACE (A) Negative mg/dL    Specific Gravity, UA 1.015 1.005 - 1.030    Blood, Urine LARGE (A) Negative    pH, UA 7.0 5.0 - 9.0    Protein, UA >=300 (A) Negative mg/dL    Urobilinogen, Urine 1.0 <2.0 E.U./dL    Nitrite, Urine POSITIVE (A) Negative    Leukocyte Esterase, Urine MODERATE (A) Negative   Microscopic Urinalysis   Result Value Ref Range    WBC, UA >20 (A) 0 - 5 /HPF    RBC, UA PACKED 0 - 2 /HPF    Bacteria, UA MANY (A) None Seen /HPF    Amorphous, UA PRESENT    Potassium   Result Value Ref Range    Potassium 4.3 3.5 - 5.0 mmol/L   TYPE AND SCREEN   Result Value Ref Range    ABO/Rh CANCELED     Antibody Screen NEG    ABORH TUBE   Result Value Ref Range    ABO/Rh A POS        RADIOLOGY:  CT ABDOMEN PELVIS WO CONTRAST Additional Contrast? None   Final Result   Apparent prior hysterectomy with mild leftward prominence of the vaginal cuff   however no distinct soft tissue mass. Mild distension of the urinary bladder   with abnormal bladder wall thickening and hyperdense filling defect of likely   clot formation series 2, image 183 within the dependent portion of the   urinary bladder. No upstream dilatation evident or calculus. No free fluid   in the pelvis.                 ------------------------- NURSING NOTES AND VITALS REVIEWED ---------------------------  Date / Time Roomed:  3/27/2022  5:06 AM  ED Bed Assignment:  4504/4504-B    The nursing notes within the ED encounter and vital signs as below have been reviewed.      Patient Vitals for the past 24 hrs:   BP Temp Temp src Pulse Resp SpO2 Height Weight   03/27/22 1520 (!) 115/59 97.3 °F (36.3 °C) Oral 65 18 99 % 5' 2\" (1.575 m) 218 lb 8 oz (99.1 kg)   03/27/22 1430 (!) 101/47 -- -- 69 -- 97 % -- --   03/27/22 1345 (!) 102/49 -- -- 75 -- -- -- --   03/27/22 1300 (!) 92/53 -- -- 94 -- -- -- --   03/27/22 1240 112/67 -- -- 91 -- -- -- --   03/27/22 1200 (!) 91/47 98 °F (36.7 °C) Oral 72 15 98 % -- --   03/27/22 1026 (!) 111/55 -- -- 71 15 99 % -- --   03/27/22 0815 (!) 102/52 -- -- 77 16 97 % -- --   03/27/22 0624 99/69 -- -- 76 -- -- -- --   03/27/22 0538 (!) 101/55 -- -- 79 -- -- -- --   03/27/22 0502 (!) 81/49 97.4 °F (36.3 °C) -- 80 12 96 % 5' 2\" (1.575 m) 215 lb (97.5 kg)       Oxygen Saturation Interpretation: Normal    ------------------------------------------ PROGRESS NOTES ------------------------------------------  See ED course for reevaluation    Counseling:  I have spoken with the patient and discussed todays results, in addition to providing specific details for the plan of care and counseling regarding the diagnosis and prognosis. Their questions are answered at this time and they are agreeable with the plan of admission.    --------------------------------- ADDITIONAL PROVIDER NOTES ---------------------------------  Consultations: This patient's ED course included: a personal history and physicial examination, re-evaluation prior to disposition, multiple bedside re-evaluations, IV medications and cardiac monitoring    This patient has remained hemodynamically stable during their ED course. Diagnosis:  1. Vaginal bleeding    2. Acute kidney injury ([address]    3. Acute cystitis with hematuria        Disposition:  Patient's disposition: Admit to med/surg floor  Patient's condition is Stable           Michelle Arguelles DO  Resident  03/27/22 1951  ATTENDING PROVIDER ATTESTATION:     I have personally performed and/or participated in the history, exam, medical decision making, and procedures and agree with all pertinent clinical information. I have also reviewed and agree with the past medical, family and social history unless otherwise noted. I have discussed this patient in detail with the resident, and provided the instruction and education regarding vaginal bleeding. My findings/Plan: I was the primary provider for patient. Patient presenting complains of vaginal bleeding that started today.   Patient reportedly was passing clots. Patient reporting no abdominal pain. Patient reporting no chest pain or difficulty breathing there is no fever chills or cough. Patient is awake alert oriented x3 heart exam normal lungs are clear abdomen soft pelvic exam noted blood in her vaginal vault. Patient reports hysterectomy. Patient has limited range of motion of lower extremities labs and CT noted reviewed. Patient given IV hydration. Patient will be admitted call was placed to on-call internal medicine. Patient also medicated for UTI.   Patient will be admitted       Wesley Guzmán MD  03/27/22 Viviana Lacey MD  03/27/22 [id number]

## 2022-03-27 NOTE — H&P
Hospital Medicine History & Physical      PCP: Alta Gastelum MD    Date of Admission: 3/27/2022    Date of Service: Pt seen/examined on 3/27/22 and Admitted to Inpatient with expected LOS greater than two midnights due to medical therapy. Chief Complaint:  Burning on urination, blood on sheets      History Of Present Illness:    79 y.o. female who presented to [facility] from [facility] with concern for vaginal bleeding as blood was noted on her bedding when staff went to clean her today. Pt states she has no vaginal symptoms and had no idea that she had vaginal bleeding. In ED UA was concerning for hematuria, proteinuria and possible UTI. Pt does report burning with urination. She was given a dose of cefepime in the ED. She was noted to have dry blood on vaginal exam but no active bleeding. CT abdomen showed a possible blood clot in the bladder. Pt was given boluses for hypotension and admitted for further evaluation and treatment. Past Medical History:          Diagnosis Date    Anxiety     Cardiomyopathy ([address] 3/24/2014    Chlamydia     Chronic back pain     Depression     Enlarged heart     Genital warts     Headache(784.0)     Hernia 3/2014    Hyperlipidemia     Hypertension     Neuropathy     Obesity     Osteoarthritis     Polio     Post traumatic stress disorder     Postmenopausal vaginal bleeding     Urinary incontinence        Past Surgical History:          Procedure Laterality Date    BREAST SURGERY      left benign cyst    BREAST SURGERY  7/86    CATARACT REMOVAL      COLONOSCOPY  3/1/2014    DILATION AND CURETTAGE OF UTERUS  04/29/2014    EYE SURGERY  9/2003 , 4/2014    HYSTEROSCOPY  04/29/2014    WISDOM TOOTH EXTRACTION         Medications Prior to Admission:      Prior to Admission medications    Medication Sig Start Date End Date Taking?  Authorizing Provider   furosemide (LASIX) 40 MG tablet Take 40 mg by mouth daily    Historical Provider, MD losartan (COZAAR) 50 MG tablet Take 50 mg by mouth daily    Historical Provider, MD   meloxicam (MOBIC) 15 MG tablet Take 15 mg by mouth daily    Historical Provider, MD   oxybutynin (DITROPAN-XL) 10 MG extended release tablet Take 10 mg by mouth daily    Historical Provider, MD   potassium chloride (KLOR-CON) 20 MEQ packet Take 20 mEq by mouth daily    Historical Provider, MD   pantoprazole sodium (PROTONIX) 40 MG PACK packet Take 40 mg by mouth every morning (before breakfast)    Historical Provider, MD   senna (SENOKOT) 8.6 MG tablet Take 1 tablet by mouth daily    Historical Provider, MD   simvastatin (ZOCOR) 10 MG tablet Take 10 mg by mouth nightly    Historical Provider, MD   acetaminophen 650 MG TABS Take 650 mg by mouth every 4 hours as needed. 2/10/15   Alan Saleh DO   clopidogrel (PLAVIX) 75 MG tablet Take 1 tablet by mouth daily. 2/10/15   Alan Saleh DO   sertraline (ZOLOFT) 50 MG tablet Take 1 tablet by mouth daily. 10/7/14   Mireya Marrero DO   tolterodine (DETROL LA) 4 MG ER capsule Take 1 capsule by mouth daily. 10/7/14   Mireya Marrero DO   FIBER SELECT GUMMIES PO Take  by mouth. Two gummies qam    Historical Provider, MD       Allergies:  Patient has no known allergies. Social History:      The patient currently lives at [facility]    TOBACCO:   reports that she has never smoked. She has never used smokeless tobacco.  ETOH:   reports no history of alcohol use. Family History:      Reviewed in detail and negative for DM, CAD, Cancer, CVA.  Positive as follows:        Problem Relation Age of Onset    Cancer Mother         ovarian cancer    Heart Disease Mother         CHF   [place] Other Mother         Hepatitis    Arthritis Mother     Diabetes Mother     High Blood Pressure Mother     High Cholesterol Mother     Kidney Disease Mother     Obesity Mother     Diabetes Father     Other Father         Cirrohis of liver and Black Lung    Alcohol Abuse Father     Arthritis Father     High Blood Pressure Father     High Cholesterol Father     Obesity Father     Arthritis Brother     High Blood Pressure Brother     High Cholesterol Brother     Arthritis Maternal Aunt     High Blood Pressure Maternal Aunt     Stroke Maternal Aunt     Arthritis Maternal Uncle     Heart Disease Maternal Uncle     Stroke Maternal Uncle     Arthritis Paternal Uncle     High Blood Pressure Paternal Uncle     High Cholesterol Paternal Uncle     Stroke Paternal Uncle     Diabetes Maternal Grandmother     Heart Disease Maternal Grandmother     Diabetes Paternal Grandmother        REVIEW OF SYSTEMS:   Pertinent positives as noted in the HPI. All other systems reviewed and negative. PHYSICAL EXAM:    BP (!) 115/59   Pulse 65   Temp 97.3 °F (36.3 °C)   Resp 18   Ht 5' 2\" (1.575 m)   Wt 215 lb (97.5 kg)   SpO2 99%   BMI 39.32 kg/m²     General appearance:  No apparent distress, appears stated age and cooperative. Appears chronically ill   HEENT:  Normal cephalic, atraumatic without obvious deformity. Pupils equal, round, and reactive to light. Extra ocular muscles intact. Conjunctivae/corneas clear. Dry oral mucosa  Neck: Supple, with full range of motion. No jugular venous distention. Trachea midline. Respiratory:  Normal respiratory effort. Clear to auscultation, bilaterally without Rales/Wheezes/Rhonchi. Cardiovascular:  Regular rate and rhythm with normal S1/S2 without murmurs, rubs or gallops. Abdomen: Soft, non-tender, non-distended with normal bowel sounds. Musculoskeletal:  No clubbing, cyanosis or edema bilaterally. Full range of motion without deformity. Skin: Skin color, texture, turgor normal.  No rashes or lesions. Neurologic:  Neurovascularly intact without any focal sensory/motor deficits.  Cranial nerves: II-XII intact, grossly non-focal.  Psychiatric:  Alert and oriented, thought content appropriate, normal insight    CT ABDOMEN PELVIS WO CONTRAST Additional Contrast? None   Preliminary Result   Apparent prior hysterectomy with mild leftward prominence of the vaginal cuff   however no distinct soft tissue mass. Mild distension of the urinary bladder   with abnormal bladder wall thickening and hyperdense filling defect of likely   clot formation series 2, image 183 within the dependent portion of the   urinary bladder. No upstream dilatation evident or calculus. No free fluid   in the pelvis. Labs:     Recent Labs     03/27/22  0546   WBC 12.3*   HGB 13.7   HCT 44.0   *     Recent Labs     03/27/22  0546 03/27/22  0759     --    K 5.7* 4.3   CL 99  --    CO2 21*  --    BUN 83*  --    CREATININE 2.0*  --    CALCIUM 9.0  --      Recent Labs     03/27/22  0546   AST 13   ALT 6   BILITOT 0.5   ALKPHOS 148*     No results for input(s): INR in the last 72 hours. No results for input(s): Chyrel Lions in the last 72 hours. Urinalysis:      Lab Results   Component Value Date    NITRU POSITIVE 03/27/2022    WBCUA >20 03/27/2022    BACTERIA MANY 03/27/2022    RBCUA PACKED 03/27/2022    BLOODU LARGE 03/27/2022    SPECGRAV 1.015 03/27/2022    GLUCOSEU Negative 03/27/2022         ASSESSMENT:  Sepsis 2/2 UTI  Possible blood clot within bladder  Postmenopausal vaginal bleeding s/p hysterectomy  Possible SHAWNA vs. SHAWNA on CKD vs CKD? Chronic diastolic HF  Hx of post polio syndrome    PLAN:  Strict I/O, monitor renal function; need to obtain records from SNF as to baseline Cr  Urology consulted for clot noted in bladder  Bolus as clinically indicated, encourage oral intake, continue MIVF  Continue rocephin, follow blood and urine cultures   Continue home meds at ordered    DVT Prophylaxis: on hold  Diet: ADULT DIET; Regular; Low Sodium (2 gm); 1500 ml  Code Status: Limited    PT/OT Eval Status: ordered    Dispo - back to SNF at [facility]    Thank you Karmen Montanez MD for the opportunity to be involved in this patient's care.  If you have any questions or concerns please feel free to contact me through [facility].

## 2022-03-28 NOTE — PROGRESS NOTES
Hospitalist Progress Note      Synopsis: Patient with hx of chronic diastolic heart failure, post polio syndrome, admitted on 3/27/2022 for sepsis 2/2 UTI, hematuria with clot in bladder, SHAWNA, and postmenopausal vaginal bleeding with known hx of hysterectomy. Subjective  Pt reports she feels like her normal self outside of general fatigue     Exam:  /63   Pulse 88   Temp 97.8 °F (36.6 °C) (Oral)   Resp 16   Ht 5' 2\" (1.575 m)   Wt 218 lb 8 oz (99.1 kg)   SpO2 100%   BMI 39.96 kg/m²     General appearance:  No apparent distress, appears stated age and cooperative. Appears chronically ill   HEENT:  Normal cephalic, atraumatic without obvious deformity. Pupils equal, round, and reactive to light. Extra ocular muscles intact. Conjunctivae/corneas clear. Dry oral mucosa  Neck: Supple, with full range of motion. No jugular venous distention. Trachea midline. Respiratory:  Normal respiratory effort. Clear to auscultation, bilaterally without Rales/Wheezes/Rhonchi. Cardiovascular:  Regular rate and rhythm with normal S1/S2 without murmurs, rubs or gallops. Abdomen: Soft, non-tender, non-distended with normal bowel sounds. Musculoskeletal:  No clubbing, cyanosis or edema bilaterally. Full range of motion without deformity. Skin: Skin color, texture, turgor normal.  No rashes or lesions. Neurologic:  Neurovascularly intact without any focal sensory/motor deficits.  Cranial nerves: II-XII intact, grossly non-focal.  Psychiatric:  Alert and oriented, thought content appropriate, normal insight    Medications:  Reviewed    Infusion Medications    sodium chloride       Scheduled Medications    miconazole   Topical BID    white petrolatum   Topical BID    clopidogrel  75 mg Oral Daily    oxybutynin  10 mg Oral Daily    pantoprazole  40 mg Oral QAM AC    senna  1 tablet Oral Daily    sertraline  50 mg Oral Daily    atorvastatin  10 mg Oral Daily    trospium  20 mg Oral Nightly    sodium chloride flush  5-40 mL IntraVENous 2 times per day    [Held by provider] heparin (porcine)  5,000 Units SubCUTAneous 3 times per day    cefTRIAXone (ROCEPHIN) IV  1,000 mg IntraVENous Q24H     PRN Meds: sodium chloride flush, sodium chloride, ondansetron **OR** ondansetron, polyethylene glycol, acetaminophen **OR** acetaminophen    I/O    Intake/Output Summary (Last 24 hours) at 3/28/2022 1509  Last data filed at 3/28/2022 1433  Gross per 24 hour   Intake 500 ml   Output 1125 ml   Net -625 ml       Labs:   Recent Labs     03/27/22  0546   WBC 12.3*   HGB 13.7   HCT 44.0   *       Recent Labs     03/27/22  0546 03/27/22  0759     --    K 5.7* 4.3   CL 99  --    CO2 21*  --    BUN 83*  --    CREATININE 2.0*  --    CALCIUM 9.0  --        Recent Labs     03/27/22  0546   PROT 7.2   ALKPHOS 148*   ALT 6   AST 13   BILITOT 0.5       No results for input(s): INR in the last 72 hours. No results for input(s): Leroy Grates in the last 72 hours. Chronic labs:  Lab Results   Component Value Date    CHOL 199 01/29/2014    TRIG 81 01/29/2014    HDL 49 01/29/2014    LDLCALC 134 (H) 01/29/2014    TSH 2.770 02/08/2015       Radiology:  Imaging studies reviewed today. ASSESSMENT:  Sepsis 2/2 UTI  Possible blood clot within bladder  Postmenopausal vaginal bleeding s/p hysterectomy  SHAWNA on CKD (baseline Cr confirmed around 1.2)  Chronic diastolic HF  Hx of post polio syndrome   Pressure ulcer on R buttock stage 3, present on admission     PLAN:  Continue rocephin, follow urine culture  Heparin on hold, continue plavix  Appreciate urology input; pt will need cysto at some point  Will need to follow up with gynecology outpatient for vaginal bleeding  Strict I/O, monitor renal function, encourage oral intake  Continue home meds as ordered   Bolus as indicated  Wound care   Still need todays labs drawn. .. Diet: ADULT DIET; Regular;  Low Sodium (2 gm); 1500 ml  Code Status: Limited  PT/OT Eval Status: ordered  DVT Prophylaxis:   scd  Recommended disposition at discharge:  SNF at dc     +++++++++++++++++++++++++++++++++++++++++++++++++  Adriana Santizo MD   [facility].  +++++++++++++++++++++++++++++++++++++++++++++++++  NOTE: This report was transcribed using voice recognition software. Every effort was made to ensure accuracy; however, inadvertent computerized transcription errors may be present.

## 2022-03-28 NOTE — CONSULTS
3/28/2022 7:05 AM  Service: Urology  Group: [facility] (Jonathan/Denice/Juan)    Raffaele Cabezas  [id number]     Chief Complaint: Gross hematuria    History of Present Illness:   The patient is a 79 y.o. female patient who presents with vaginal bleeding  She has not seen a urologist in the past   She does have some OAB taking oxybutynin  She has not had a cysto    Past Medical History:   Diagnosis Date    Acute kidney failure ([address]     Anemia     Anxiety     Cardiomyopathy ([address] 3/24/2014    Chlamydia     Chronic back pain     Depression     Difficulty in walking     Enlarged heart     Essential hypertension     Genital warts     Headache(784.0)     [facility] failure [facility]     Hernia 3/2014    Hyperlipidemia     Hypertension     Malignant neoplasm of endometrium ([facility]     Neuropathy     Obesity     Osteoarthritis     Polio     Post traumatic stress disorder     Postmenopausal vaginal bleeding     Urinary incontinence        Past Surgical History:   Procedure Laterality Date    BREAST SURGERY      left benign cyst    BREAST SURGERY  7/86    CATARACT REMOVAL      COLONOSCOPY  3/1/2014    DILATION AND CURETTAGE OF UTERUS  04/29/2014    EYE SURGERY  9/2003 , 4/2014    HYSTEROSCOPY  04/29/2014    WISDOM TOOTH EXTRACTION         Medications Prior to Admission:    Medications Prior to Admission: mirtazapine (REMERON) 15 MG tablet, Take 7.5 mg by mouth nightly  fluticasone (FLONASE) 50 MCG/ACT nasal spray, 2 sprays by Each Nostril route daily  valsartan (DIOVAN) 80 MG tablet, Take 80 mg by mouth daily  cyanocobalamin 1000 MCG/ML injection, Inject 1,000 mcg into the muscle every 30 days  melatonin 3 MG TABS tablet, Take 6 mg by mouth at bedtime  ergocalciferol (ERGOCALCIFEROL) 1.25 MG (62232 UT) capsule, Take 50,000 Units by mouth once a week Thursday  magnesium oxide (MAG-OX) 400 MG tablet, Take 400 mg by mouth daily  ferrous sulfate (IRON 325) 325 (65 Fe) MG tablet, Take 325 mg by mouth every other day  Sodium Phosphates (FLEET) 7-19 GM/118ML, Place 1 enema rectally once as needed  magnesium hydroxide (MILK OF MAGNESIA) 400 MG/5ML suspension, Take by mouth daily as needed for Constipation  furosemide (LASIX) 40 MG tablet, Take 40 mg by mouth daily  losartan (COZAAR) 50 MG tablet, Take 50 mg by mouth daily  meloxicam (MOBIC) 15 MG tablet, Take 15 mg by mouth daily  oxybutynin (DITROPAN-XL) 10 MG extended release tablet, Take 10 mg by mouth daily  potassium chloride (KLOR-CON) 20 MEQ packet, Take 20 mEq by mouth daily  pantoprazole sodium (PROTONIX) 40 MG PACK packet, Take 40 mg by mouth every morning (before breakfast)  senna (SENOKOT) 8.6 MG tablet, Take 1 tablet by mouth daily  simvastatin (ZOCOR) 10 MG tablet, Take 10 mg by mouth nightly  acetaminophen 650 MG TABS, Take 650 mg by mouth every 4 hours as needed. clopidogrel (PLAVIX) 75 MG tablet, Take 1 tablet by mouth daily. sertraline (ZOLOFT) 50 MG tablet, Take 1 tablet by mouth daily. tolterodine (DETROL LA) 4 MG ER capsule, Take 1 capsule by mouth daily. FIBER SELECT GUMMIES PO, Take  by mouth. Two gummies qam    Allergies:    Patient has no known allergies. Social History:    reports that she has never smoked. She has never used smokeless tobacco. She reports that she does not drink alcohol and does not use drugs. Family History:   Non-contributory to this urological problem  family history includes Alcohol Abuse in her father; Arthritis in her brother, father, maternal aunt, maternal uncle, mother, and paternal uncle; Cancer in her mother; Diabetes in her father, maternal grandmother, mother, and paternal grandmother; Heart Disease in her maternal grandmother, maternal uncle, and mother; High Blood Pressure in her brother, father, maternal aunt, mother, and paternal uncle; High Cholesterol in her brother, father, mother, and paternal uncle; Kidney Disease in her mother; Obesity in her father and mother;  Other in her father and mother; Stroke in her maternal aunt, maternal uncle, and paternal uncle. Review of Systems:  Respiratory: negative for cough and hemoptysis  Cardiovascular: negative for chest pain and dyspnea  Gastrointestinal: negative for abdominal pain, diarrhea, nausea and vomiting  Derm: negative for rash and skin lesion(s)  Neurological: negative for seizures and tremors  Endocrine: negative for diabetic symptoms including polydipsia and polyuria  : As above in the HPI, otherwise negative  All other reviews are negative    Physical Exam:   Vitals: BP (!) 91/52   Pulse 67   Temp 97.9 °F (36.6 °C) (Oral)   Resp 16   Ht 5' 2\" (1.575 m)   Wt 218 lb 8 oz (99.1 kg)   SpO2 98%   BMI 39.96 kg/m²   General:  Awake, alert, oriented X 3. Well developed, well nourished, well groomed. No apparent distress. HEENT:  Normocephalic, atraumatic. Pupils equal, round. No scleral icterus. No conjunctival injection. Normal lips, teeth, and gums. No nasal discharge. Neck:  Supple, no masses. Heart:  RRR  Lungs:  No audible wheezing. Respirations symmetric and non-labored. Abdomen:  soft, nontender, no masses, no organomegaly, no peritoneal signs  Extremities:  No clubbing, cyanosis, or edema  Skin:  Warm and dry, no open lesions or rashes  Neuro:  Cranial nerves 2-12 intact, no focal deficits  Rectal: deferred  Genitalia:  Cobos red    Labs:   Recent Labs     03/27/22  0546   WBC 12.3*   RBC 4.84   HGB 13.7   HCT 44.0   MCV 90.9   MCH 28.3   MCHC 31.1*   RDW 15.7*   *   MPV NOT CALC       Recent Labs     03/27/22  0546   CREATININE 2.0*     Apparent prior hysterectomy with mild leftward prominence of the vaginal cuff   however no distinct soft tissue mass.  Mild distension of the urinary bladder   with abnormal bladder wall thickening and hyperdense filling defect of likely   clot formation series 2, image 183 within the dependent portion of the   urinary bladder.  No upstream dilatation evident or calculus.  No free fluid   in the pelvis.        Images:                  Assessment: Aundra Dys 79 y.o. female     Gross hematuria with clot  Urge related incontinence    Plan:    CT was reviewed  Cont the lizama  Cont the irrigations  Send urine C&S  Send urine cytology  Will need a cysto at some point     Rocco Hardin, DO   [facility]

## 2022-03-28 NOTE — FLOWSHEET NOTE
Inpatient Wound Care(initial evaluation)  4504b    Admit Date: 3/27/2022  5:06 AM    Reason for consult:  Right buttocks, folds    Significant history:  Refer to H & P  Extensive history  Presented to [facility] from [facility] with concern for vaginal bleeding as blood was noted on her bedding when staff went to clean her today. Pt states she has no vaginal symptoms and had no idea that she had vaginal bleeding. Wound history:  Admitted with wound    Findings:     03/28/22 1010   Skin Integrity   Skin Integrity   (old scars, old healed areas)   Location abdomen   Treatment   (interdry ordered)   Skin Integrity Site 2   Skin Integrity Location 2 Redness   Location 2 under breast folds   Skin Integrity Site 3   Skin Integrity Location 3 Excoriation; Redness  (wet, areas of erosion(under left abdomen))    Location 3 abdominal fold   Skin Integrity Site 4   Skin Integrity Location 4 Bruising; Redness   Location 4 BUE   Skin Integrity Site 5   Skin Integrity Location 5   (dry thick dry skin)   Location 5 feet   Wound 03/27/22 Buttocks Right   Date First Assessed/Time First Assessed: 03/27/22 1602   Present on Hospital Admission: Yes  Primary Wound Type: Pressure Injury  Location: Buttocks  Wound Location Orientation: Right   Wound Image    Wound Etiology Pressure Stage  3   Dressing/Treatment Alginate  (stratasorb)   Wound Length (cm) 2.6 cm   Wound Width (cm) 2 cm   Wound Depth (cm) 0.1 cm   Wound Surface Area (cm^2) 5.2 cm^2   Change in Wound Size % (l*w) -541.98   Wound Volume (cm^3) 0.52 cm^3   Wound Assessment Pink/red  (yellow)   Drainage Amount None   Oneyda-wound Assessment Intact   large abdominal pannus  **Informed Consent**    The patient has given verbal consent to have photos taken of wound and inserted into their chart as part of their permanent medical record for purposes of documentation, treatment management and/or medical review.    All Images taken on 3/28/22 of patient name: Blanche Johnsonstephanie coleman transmitted and stored on secured [employer] located within [facility] Tab by a registered [employer] Mobile Application Device.      Impression:  Right buttocks stage 3    Plan:  rell Desai right buttocks  interdry to pannus  Aquaphor to dry skin  Antifungal powder under abdominal fold      Rod Lester RN 3/28/2022 11:01 AM

## 2022-03-29 NOTE — PROGRESS NOTES
Call out to NP regarding whether patient will require an inpatient or outpatient cysto, awaiting call back at this time.

## 2022-03-29 NOTE — DISCHARGE INSTR - COC
Continuity of Care Form    Patient Name: Fabiana Dowd   :  1955  MRN:  [id number]    Admit date:  3/27/2022  Discharge date:  ***    Code Status Order: Limited   Advance Directives:      Admitting Physician:  Nedra Stone MD  PCP: Dottie Donaldson MD    Discharging Nurse: [facility] Unit/Room#: 1844/3048-R  Discharging Unit Phone Number: ***    Emergency Contact:   Extended Emergency Contact Information  Primary Emergency Contact: [address] Phone: [phone]  Relation: Other  Secondary Emergency Contact: None,Per Pt  Relation: Other    Past Surgical History:  Past Surgical History:   Procedure Laterality Date    BREAST SURGERY      left benign cyst    BREAST SURGERY      CATARACT REMOVAL      COLONOSCOPY  3/1/2014    DILATION AND CURETTAGE OF UTERUS  2014    EYE SURGERY  2003 , 2014    HYSTEROSCOPY  2014    WISDOM TOOTH EXTRACTION         Immunization History:   Immunization History   Administered Date(s) Administered    Influenza Virus Vaccine 02/10/2015    Pneumococcal Polysaccharide ([address]hmchz[address]69) 02/10/2015       Active Problems:  Patient Active Problem List   Diagnosis Code    Hypertension I10    Depression F32. A    Arthritis M19.90    H/O vaginal bleeding Z87.42    Dermatitis L30.9    Anxiety F41.9    Hyperlipidemia E78.5    Vitamin B12 deficiency E53.8    Vitamin D deficiency E55.9    Abnormal EKG R94.31    Enlarged heart I51.7    Postmenopausal bleeding N95.0    Obesity E66.9    Generalized weakness R53.1    Tibial fracture S82.209A    SIRS (systemic inflammatory response syndrome) ([facility]) R65.10    Polio A80.9    SHAWNA (acute kidney injury) ([facility] N17.9    Sepsis due to urinary tract infection ([facility]) A41.9, N39.0       Isolation/Infection:   Isolation            No Isolation          Patient Infection Status       None to display            Nurse Assessment:  Last Vital Signs: /61   Pulse 89   Temp 98.4 °F (36.9 °C) (Oral)   Resp 18   Ht 5' 2\" (1.575 m)   Wt 218 lb 8 oz (99.1 kg)   SpO2 99%   BMI 39.96 kg/m²     Last documented pain score (0-10 scale): Pain Level: 0  Last Weight:   Wt Readings from Last 1 Encounters:   03/27/22 218 lb 8 oz (99.1 kg)     Mental Status:  oriented    IV Access:  - None    Nursing Mobility/ADLs:  Walking   Dependent  Transfer  Dependent  Bathing  Assisted  Dressing  Assisted  Toileting  Assisted  Feeding  Assisted  Med Admin  Assisted  Med Delivery   whole    Wound Care Documentation and Therapy:  Wound 03/27/22 Buttocks Right (Active)   Wound Image   03/28/22 1010   Wound Etiology Pressure Stage  3 03/28/22 2340   Dressing Status Clean;Dry; Intact 03/29/22 0813   Wound Cleansed Cleansed with saline 03/27/22 1610   Dressing/Treatment Alginate 03/28/22 1010   Dressing Change Due 03/29/22 03/28/22 2340   Wound Length (cm) 2.6 cm 03/28/22 1010   Wound Width (cm) 2 cm 03/28/22 1010   Wound Depth (cm) 0.1 cm 03/28/22 1010   Wound Surface Area (cm^2) 5.2 cm^2 03/28/22 1010   Change in Wound Size % (l*w) -541.98 03/28/22 1010   Wound Volume (cm^3) 0.52 cm^3 03/28/22 1010   Wound Assessment Pink/red 03/28/22 1010   Drainage Amount None 03/28/22 2340   Odor None 03/28/22 2340   Oneyda-wound Assessment Intact 03/28/22 2340   Number of days: 1        Elimination:  Continence: Bowel: Yes  Bladder: Yes  Urinary Catheter: Insertion Date: 3-27-22    Colostomy/Ileostomy/Ileal Conduit: No       Date of Last BM: ***    Intake/Output Summary (Last 24 hours) at 3/29/2022 1047  Last data filed at 3/29/2022 0548  Gross per 24 hour   Intake 860 ml   Output 750 ml   Net 110 ml     I/O last 3 completed shifts: In: 200 [P.O.:980]  Out: [address] [Urine:1675]    Safety Concerns: At Risk for Falls    Impairments/Disabilities:      None    Nutrition Therapy:  Current Nutrition Therapy:   - Oral Diet:  Low Sodium (2gm)    Routes of Feeding: Oral  Liquids:  Thin Liquids  Daily Fluid Restriction: yes - amount 1500  Last Modified Barium Swallow with Video (Video Swallowing Test): not done    Treatments at the Time of Hospital Discharge:   Respiratory Treatments:     Oxygen Therapy:  is not on home oxygen therapy. Ventilator:    - No ventilator support    Rehab Therapies: {THERAPEUTIC INTERVENTION:[id number]}  Weight Bearing Status/Restrictions: [address] [employer] Weight Bear[employer]}  Other Medical Equipment (for information only, NOT a DME order):  {EQUIPMENT:[id number]}  Other Treatments: ***    Patient's personal belongings (please select all that are sent with patient):  {P DME Belongings:793226424}    RN SIGNATURE:  Electronically signed by Montana Virgen RN on 3/29/22 at 10:49 AM EDT    CASE MANAGEMENT/SOCIAL WORK SECTION    Inpatient Status Date: ***    Readmission Risk Assessment Score:  Readmission Risk              Risk of Unplanned Readmission:  10           Discharging to Facility/ Agency   Name:   Address:  Phone:  Fax:    Dialysis Facility (if applicable)   Name:  Address:  Dialysis Schedule:  Phone:  Fax:    / signature: {Esignature:[id number]}    PHYSICIAN SECTION    Prognosis: Fair    Condition at Discharge: Stable    Rehab Potential (if transferring to Rehab): Fair    Recommended Labs or Other Treatments After Discharge: cbc, bmp in 3 days     Physician Certification: I certify the above information and transfer of Alisha Simon  is necessary for the continuing treatment of the diagnosis listed and that she requires [facility] for less 30 days.      Update Admission H&P: No change in H&P    PHYSICIAN SIGNATURE:  Electronically signed by Gurinder Munoz MD on 3/29/22 at 12:56 PM EDT

## 2022-03-29 NOTE — CARE COORDINATION
Patient for discharge to [facility] today. Storm Singer arranged for 1200. Attempted to notify NOK listed and rings busy. Notified patient of return time. Facility  Notified of discharge time. For questions I can be reached at [phone].  [place], [place]

## 2022-04-22 NOTE — DISCHARGE SUMMARY
Hospital Medicine Discharge Summary    Patient ID: Cheryle Felts      Patient's PCP: Cheyenne Villasenor MD    Admit Date: 3/27/2022     Discharge Date: 3/29/2022      Admitting Physician: Enid Watts MD     Discharge Physician: Eind Watts MD     Discharge Diagnoses: Active Hospital Problems    Diagnosis Date Noted    SHAWNA (acute kidney injury) ([facility] [N17.9] 03/27/2022    Sepsis due to urinary tract infection ([facility] [A41.9, N39.0] 03/27/2022       The patient was seen and examined on day of discharge and this discharge summary is in conjunction with any daily progress note from day of discharge. Hospital Course:     Patient with hx of chronic diastolic heart failure, post polio syndrome, admitted on 3/27/2022 for sepsis 2/2 UTI, hematuria with clot in bladder, SHAWNA, and postmenopausal vaginal bleeding with known hx of hysterectomy. Was treated with rocephin with clinical improvement. Renal function improved. Will need to follow up with gynecology outpatient. Discharged to SNF on 4/22/22 in stable condition with pcp follow up         Exam:     /61   Pulse 89   Temp 98.4 °F (36.9 °C) (Oral)   Resp 18   Ht 5' 2\" (1.575 m)   Wt 218 lb 8 oz (99.1 kg)   SpO2 99%   BMI 39.96 kg/m²     General appearance: No apparent distress, appears stated age and cooperative. HEENT: Pupils equal, round, and reactive to light. Conjunctivae/corneas clear. Neck: Supple, with full range of motion. No jugular venous distention. Trachea midline. Respiratory:  Normal respiratory effort. Clear to auscultation, bilaterally without Rales/Wheezes/Rhonchi. Cardiovascular: Regular rate and rhythm with normal S1/S2 without murmurs, rubs or gallops. Abdomen: Soft, non-tender, non-distended with normal bowel sounds. Musculoskeletal: No clubbing, cyanosis or edema bilaterally. Full range of motion without deformity. Skin: Skin color, texture, turgor normal.  No rashes or lesions.   Neurologic:  Grossly nonfocal  Psychiatric: Alert and oriented, thought content appropriate, normal insight      Consults:     IP CONSULT TO INTERNAL MEDICINE  IP CONSULT TO UROLOGY  IP CONSULT TO DIETITIAN    Significant Diagnostic Studies:     CT ABDOMEN PELVIS WO CONTRAST Additional Contrast? None   Final Result   Apparent prior hysterectomy with mild leftward prominence of the vaginal cuff   however no distinct soft tissue mass. Mild distension of the urinary bladder   with abnormal bladder wall thickening and hyperdense filling defect of likely   clot formation series 2, image 183 within the dependent portion of the   urinary bladder. No upstream dilatation evident or calculus. No free fluid   in the pelvis. Disposition:  SNF     Discharge Instructions/Follow-up:  Keep scheduled follow up appointments. Take medications as prescribed. Code Status:  Prior     Activity: activity as tolerated    Diet: regular diet    Labs:  For convenience and continuity at follow-up the following most recent labs are provided:      CBC:    Lab Results   Component Value Date    WBC 10.4 03/28/2022    HGB 11.6 03/28/2022    HCT 37.5 03/28/2022     03/28/2022       Renal:    Lab Results   Component Value Date     03/28/2022    K 4.3 03/28/2022     03/28/2022    CO2 18 03/28/2022    BUN 55 03/28/2022    CREATININE 1.2 03/28/2022    CALCIUM 8.4 03/28/2022    PHOS 3.0 02/08/2015       Discharge Medications:     Discharge Medication List as of 3/29/2022  1:00 PM           Details   cefdinir (OMNICEF) 300 MG capsule Take 1 capsule by mouth 2 times daily for 5 days, Disp-10 capsule, R-0Normal              Details   mirtazapine (REMERON) 15 MG tablet Take 7.5 mg by mouth nightlyHistorical Med      fluticasone (FLONASE) 50 MCG/ACT nasal spray 2 sprays by Each Nostril route dailyHistorical Med      cyanocobalamin 1000 MCG/ML injection Inject 1,000 mcg into the muscle every 30 daysHistorical Med      melatonin 3 MG TABS tablet Take 6 mg by mouth at bedtimeHistorical Med      ergocalciferol (ERGOCALCIFEROL) 1.25 MG (81199 UT) capsule Take 50,000 Units by mouth once a week ThursdayHistorical Med      magnesium oxide (MAG-OX) 400 MG tablet Take 400 mg by mouth dailyHistorical Med      ferrous sulfate (IRON 325) 325 (65 Fe) MG tablet Take 325 mg by mouth every other dayHistorical Med      Sodium Phosphates (FLEET) 7-19 GM/118ML Place 1 enema rectally once as neededHistorical Med      magnesium hydroxide (MILK OF MAGNESIA) 400 MG/5ML suspension Take by mouth daily as needed for ConstipationHistorical Med      furosemide (LASIX) 40 MG tablet Take 40 mg by mouth dailyHistorical Med      oxybutynin (DITROPAN-XL) 10 MG extended release tablet Take 10 mg by mouth dailyHistorical Med      potassium chloride (KLOR-CON) 20 MEQ packet Take 20 mEq by mouth dailyHistorical Med      pantoprazole sodium (PROTONIX) 40 MG PACK packet Take 40 mg by mouth every morning (before breakfast)Historical Med      senna (SENOKOT) 8.6 MG tablet Take 1 tablet by mouth dailyHistorical Med      simvastatin (ZOCOR) 10 MG tablet Take 10 mg by mouth nightlyHistorical Med      acetaminophen 650 MG TABS Take 650 mg by mouth every 4 hours as needed. , Disp-120 tablet, R-3Normal      clopidogrel (PLAVIX) 75 MG tablet Take 1 tablet by mouth daily. , Disp-30 tablet, R-3Normal      sertraline (ZOLOFT) 50 MG tablet Take 1 tablet by mouth daily. , Disp-30 tablet, R-5      tolterodine (DETROL LA) 4 MG ER capsule Take 1 capsule by mouth daily. , Disp-30 capsule, R-5      FIBER SELECT GUMMIES PO Take  by mouth. Two gummies qam             Time Spent on discharge is more than 45 minutes in the examination, evaluation, counseling and review of medications and discharge plan.       Signed:    Jenna Moody MD   4/22/2022

## 2022-04-30 PROBLEM — G93.41 ACUTE METABOLIC ENCEPHALOPATHY: Status: ACTIVE | Noted: 2022-01-01

## 2022-04-30 NOTE — ED PROVIDER NOTES
Patient presents with altered mental status. EMS reports nursing home states she was unresponsive except to pain and was disoriented. Patient is currently alert and oriented x3. She states she has had some confusion over the past couple of days. She denies any other accompanying symptoms including but not limited to fevers, chest pain, shortness of breath, nausea, vomiting, or pain elsewhere. The history is provided by the patient and the EMS personnel. No  was used. Review of Systems   Constitutional: Negative for chills and fever. HENT: Negative for ear pain, sinus pressure and sore throat. Eyes: Negative for pain, discharge and redness. Respiratory: Negative for cough, shortness of breath and wheezing. Cardiovascular: Negative for chest pain. Gastrointestinal: Negative for abdominal distention, diarrhea, nausea and vomiting. Genitourinary: Negative for dysuria and frequency. Musculoskeletal: Negative for arthralgias and back pain. Skin: Negative for rash and wound. Neurological: Negative for weakness and headaches. Hematological: Negative for adenopathy. All other systems reviewed and are negative. Physical Exam  Vitals and nursing note reviewed. Constitutional:       General: She is not in acute distress. Appearance: She is well-developed. She is obese. HENT:      Head: Normocephalic and atraumatic. Eyes:      Conjunctiva/sclera: Conjunctivae normal.   Cardiovascular:      Rate and Rhythm: Normal rate and regular rhythm. Heart sounds: Normal heart sounds. No murmur heard. Pulmonary:      Effort: Pulmonary effort is normal. No respiratory distress. Breath sounds: Normal breath sounds. No wheezing or rales. Abdominal:      General: Bowel sounds are normal.      Palpations: Abdomen is soft. Tenderness: There is no abdominal tenderness. There is no guarding or rebound.    Genitourinary:     Comments: Cobos in place with purple bag and simone blood  Musculoskeletal:      Cervical back: Normal range of motion and neck supple. Skin:     General: Skin is warm and dry. Neurological:      Mental Status: She is alert and oriented to person, place, and time. GCS: GCS eye subscore is 3. GCS verbal subscore is 5. GCS motor subscore is 6. Cranial Nerves: No cranial nerve deficit. Coordination: Coordination normal.          Procedures     MDM  Number of Diagnoses or Management Options  Acute metabolic encephalopathy  Complicated UTI (urinary tract infection)  Nursing home resident  Diagnosis management comments: Patient presents with concern for altered mental status. Patient is tachycardic. Fluids given with improvement. CBC showed a leukocytosis at 19.5 along with a stable anemia. Cultures were drawn and are pending. CMP showed a hypokalemia at 3.3. Replacement given. Urinalysis did show concern for UTI. Ceftriaxone given. CT Chest showed incidental adrenal nodules. CT Head without emergent intracranial abnormalities. Given patient's baseline status, concern for deterioration at facility. Patient will be admitted for further evaluation and care for her suspected sepsis. Patient admitted to telemetry. Amount and/or Complexity of Data Reviewed  Clinical lab tests: reviewed  Tests in the radiology section of CPT®: reviewed  Decide to obtain previous medical records or to obtain history from someone other than the patient: yes         ED Course as of 05/02/22 0753   Sat Apr 30, 2022   1321 EKG shows sinus tachycardia with a ventricular rate of 109 bpm.  There is a left anterior fascicular block present. Does not meet STEMI criteria. Comparable to previous EKG on 4/15/2014. As interpreted by myself the ED physician. [BB]   1130 EKG: This EKG is signed and interpreted by me.     Rate: 106  Rhythm: Sinus  Interpretation: Sinus rhythm with sinus tachycardia, left axis, left anterior fascicular block, TX is 146, QRS is 102, QTc is 527, this appears generally stable compared to prior EKG  Comparison: changes compared to previous EKG   [ME]   1757 3-Hour Sepsis Re-examination  4/30/22   1600 PM EDT       Card/Pulm:  Rhythm: rapid rate. Heart Sounds: Normal S1, S2. unlabored breathing. Capillary Refill: normal.  Radial Pulse:  present 2+. Skin:  Warm   [ME]   56 Spoke with Dr. Emmanuel Adams (Medicine). Discussed case. They will admit this patient   [ME]      ED Course User Index  [BB] Todd Barahona DO  [ME] Charisse Cassidy DO        ED Course as of 05/02/22 0753   Sat Apr 30, 2022   1321 EKG shows sinus tachycardia with a ventricular rate of 109 bpm.  There is a left anterior fascicular block present. Does not meet STEMI criteria. Comparable to previous EKG on 4/15/2014. As interpreted by myself the ED physician. [BB]   1756 EKG: This EKG is signed and interpreted by me. Rate: 106  Rhythm: Sinus  Interpretation: Sinus rhythm with sinus tachycardia, left axis, left anterior fascicular block, HI is 146, QRS is 102, QTc is 527, this appears generally stable compared to prior EKG  Comparison: changes compared to previous EKG   [ME]   1757 3-Hour Sepsis Re-examination  4/30/22   1600 PM EDT       Card/Pulm:  Rhythm: rapid rate. Heart Sounds: Normal S1, S2. unlabored breathing. Capillary Refill: normal.  Radial Pulse:  present 2+. Skin:  Warm   [ME]   56 Spoke with Dr. Emmanuel Adams (Medicine). Discussed case.   They will admit this patient   [ME]      ED Course User Index  [BB] Todd Barahona DO  [ME] Charisse Cassidy DO       --------------------------------------------- PAST HISTORY ---------------------------------------------  Past Medical History:  has a past medical history of Acute kidney failure ([facility] [address] Anemia, Anxiety, Cardiomyopathy ([address] Chlamydia, Chronic back pain, Depression, Difficulty in walking, Enlarged heart, Essential hypertension, Genital warts, Headache(784.0), Heart failure ([facility] Hernia, Hyperlipidemia, Hypertension, Malignant neoplasm of endometrium ([facility] [address] Neuropathy, Obesity, Osteoarthritis, Polio, Post traumatic stress disorder, Postmenopausal vaginal bleeding, and Urinary incontinence. Past Surgical History:  has a past surgical history that includes [place] tooth extraction; Cataract removal; Colonoscopy (3/1/2014); hysteroscopy (04/29/2014); Dilation and curettage of uterus (04/29/2014); Breast surgery; Breast surgery (7/86); and eye surgery (9/2003 , 4/2014). Social History:  reports that she has never smoked. She has never used smokeless tobacco. She reports that she does not drink alcohol and does not use drugs. Family History: family history includes Alcohol Abuse in her father; Arthritis in her brother, father, maternal aunt, maternal uncle, mother, and paternal uncle; Cancer in her mother; Diabetes in her father, maternal grandmother, mother, and paternal grandmother; Heart Disease in her maternal grandmother, maternal uncle, and mother; High Blood Pressure in her brother, father, maternal aunt, mother, and paternal uncle; High Cholesterol in her brother, father, mother, and paternal uncle; Kidney Disease in her mother; Obesity in her father and mother; Other in her father and mother; Stroke in her maternal aunt, maternal uncle, and paternal uncle. The patients home medications have been reviewed. Allergies: Patient has no known allergies.     -------------------------------------------------- RESULTS -------------------------------------------------    LABS:  Results for orders placed or performed during the hospital encounter of 04/30/22   Culture, Blood 1    Specimen: Blood   Result Value Ref Range    Blood Culture, Routine (A)      Gram stain performed from blood culture bottle media  Gram negative rods      Organism Gram negative rebecca (A)     Blood Culture, Routine Identification and sensitivity to follow    Culture, Blood 2    Specimen: Blood   Result Value Ref Range Culture, Blood 2 24 Hours no growth    Culture, Urine    Specimen: Urine, clean catch   Result Value Ref Range    Urine Culture, Routine       Growth present, evaluating for:  Gram negative rods     CBC with Auto Differential   Result Value Ref Range    WBC 19.5 (H) 4.5 - 11.5 E9/L    RBC 3.90 3.50 - 5.50 E12/L    Hemoglobin 10.6 (L) 11.5 - 15.5 g/dL    Hematocrit 33.2 (L) 34.0 - 48.0 %    MCV 85.1 80.0 - 99.9 fL    MCH 27.2 26.0 - 35.0 pg    MCHC 31.9 (L) 32.0 - 34.5 %    RDW 16.4 (H) 11.5 - 15.0 fL    Platelets 702 (L) 349 - 450 E9/L    MPV NOT CALC 7.0 - 12.0 fL    Neutrophils % 85.0 (H) 43.0 - 80.0 %    Immature Granulocytes % 2.2 0.0 - 5.0 %    Lymphocytes % 9.3 (L) 20.0 - 42.0 %    Monocytes % 3.1 2.0 - 12.0 %    Eosinophils % 0.0 0.0 - 6.0 %    Basophils % 0.4 0.0 - 2.0 %    Neutrophils Absolute 16.62 (H) 1.80 - 7.30 E9/L    Immature Granulocytes # 0.43 E9/L    Lymphocytes Absolute 1.82 1.50 - 4.00 E9/L    Monocytes Absolute 0.60 0.10 - 0.95 E9/L    Eosinophils Absolute 0.00 (L) 0.05 - 0.50 E9/L    Basophils Absolute 0.07 0.00 - 0.20 E9/L   Comprehensive Metabolic Panel w/ Reflex to MG   Result Value Ref Range    Sodium 145 132 - 146 mmol/L    Potassium reflex Magnesium 3.3 (L) 3.5 - 5.0 mmol/L    Chloride 106 98 - 107 mmol/L    CO2 25 22 - 29 mmol/L    Anion Gap 14 7 - 16 mmol/L    Glucose 136 (H) 74 - 99 mg/dL    BUN 23 6 - 23 mg/dL    CREATININE 0.8 0.5 - 1.0 mg/dL    GFR Non-African American >60 >=60 mL/min/1.73    GFR African American >60     Calcium 8.4 (L) 8.6 - 10.2 mg/dL    Total Protein 6.2 (L) 6.4 - 8.3 g/dL    Albumin 2.7 (L) 3.5 - 5.2 g/dL    Total Bilirubin 0.5 0.0 - 1.2 mg/dL    Alkaline Phosphatase 121 (H) 35 - 104 U/L    ALT 8 0 - 32 U/L    AST 12 0 - 31 U/L   Troponin   Result Value Ref Range    Troponin, High Sensitivity 23 (H) 0 - 9 ng/L   Urinalysis with Microscopic   Result Value Ref Range    Color, UA OTHER (A) Straw/Yellow    Clarity, UA TURBID (A) Clear    Glucose, Ur Negative Negative mg/dL    Bilirubin Urine LARGE (A) Negative    Ketones, Urine 15 (A) Negative mg/dL    Specific Gravity, UA 1.010 1.005 - 1.030    Blood, Urine LARGE (A) Negative    pH, UA 8.5 5.0 - 9.0    Protein, UA >=300 (A) Negative mg/dL    Urobilinogen, Urine 1.0 <2.0 E.U./dL    Nitrite, Urine POSITIVE (A) Negative    Leukocyte Esterase, Urine LARGE (A) Negative    WBC, UA PACKED (A) 0 - 5 /HPF    RBC, UA PACKED 0 - 2 /HPF    Bacteria, UA MANY (A) None Seen /HPF    Crystals, UA Few (A) None Seen /HPF   Lactate, Sepsis   Result Value Ref Range    Lactic Acid, Sepsis 1.9 0.5 - 1.9 mmol/L   Lactate, Sepsis   Result Value Ref Range    Lactic Acid, Sepsis 2.6 (H) 0.5 - 1.9 mmol/L   Magnesium   Result Value Ref Range    Magnesium 2.2 1.6 - 2.6 mg/dL   CBC with Auto Differential   Result Value Ref Range    WBC 32.1 (H) 4.5 - 11.5 E9/L    RBC 4.10 3.50 - 5.50 E12/L    Hemoglobin 11.3 (L) 11.5 - 15.5 g/dL    Hematocrit 35.9 34.0 - 48.0 %    MCV 87.6 80.0 - 99.9 fL    MCH 27.6 26.0 - 35.0 pg    MCHC 31.5 (L) 32.0 - 34.5 %    RDW 16.5 (H) 11.5 - 15.0 fL    Platelets 87 (L) 335 - 450 E9/L    MPV NOT CALC 7.0 - 12.0 fL    Neutrophils % 94.8 (H) 43.0 - 80.0 %    Lymphocytes % 2.6 (L) 20.0 - 42.0 %    Monocytes % 0.8 (L) 2.0 - 12.0 %    Eosinophils % 0.0 0.0 - 6.0 %    Basophils % 0.3 0.0 - 2.0 %    Neutrophils Absolute 31.14 (H) 1.80 - 7.30 E9/L    Lymphocytes Absolute 0.96 (L) 1.50 - 4.00 E9/L    Monocytes Absolute 0.32 0.10 - 0.95 E9/L    Eosinophils Absolute 0.00 (L) 0.05 - 0.50 E9/L    Basophils Absolute 0.00 0.00 - 0.20 E9/L    Metamyelocytes Relative 0.9 0.0 - 1.0 %    Myelocyte Percent 0.9 0 - 0 %    Anisocytosis 1+     Polychromasia 1+     Poikilocytes 1+     Ovalocytes 1+    Comprehensive Metabolic Panel w/ Reflex to MG   Result Value Ref Range    Sodium 144 132 - 146 mmol/L    Potassium reflex Magnesium 3.7 3.5 - 5.0 mmol/L    Chloride 104 98 - 107 mmol/L    CO2 20 (L) 22 - 29 mmol/L    Anion Gap 20 (H) 7 - 16 mmol/L Glucose 155 (H) 74 - 99 mg/dL    BUN 25 (H) 6 - 23 mg/dL    CREATININE 0.8 0.5 - 1.0 mg/dL    GFR Non-African American >60 >=60 mL/min/1.73    GFR African American >60     Calcium 8.7 8.6 - 10.2 mg/dL    Total Protein 6.7 6.4 - 8.3 g/dL    Albumin 2.7 (L) 3.5 - 5.2 g/dL    Total Bilirubin 0.4 0.0 - 1.2 mg/dL    Alkaline Phosphatase 127 (H) 35 - 104 U/L    ALT 9 0 - 32 U/L    AST 10 0 - 31 U/L   Blood ID, Molecular   Result Value Ref Range    Bottle Type Aerobic     Source of Blood Culture No site given     Order Number [id number]     Acinetobacter calcoac baumannii complex by PCR Not Detected Not Detected    Bacteroides fragilis by PCR Not Detected Not Detected    Enterobacteriaceae by PCR DETECTED (AA) Not Detected    Enterobacter cloacae complex by PCR Not Detected Not Detected    Enterococcus faecalis by PCR Not Detected Not Detected    Enterococcus faecium by PCR Not Detected Not Detected    Escherichia coli by PCR DETECTED (AA) Not Detected    Haemophilus Influenzae by PCR Not Detected Not Detected    Klebsiella aerogenes by PCR Not Detected Not Detected    Klebsiella oxytoca by PCR Not Detected Not Detected    Klebsiella pneumoniae group by PCR Not Detected Not Detected    Listeria monocytogenes by PCR Not Detected Not Detected    Neisseria meningitidis by PCR Not Detected Not Detected    Proteus species by PCR Not Detected Not Detected    Pseudomonas aeruginosa by PCR Not Detected Not Detected    Salmonella species by PCR Not Detected Not Detected    Streptococcus agalactiae by PCR Not Detected Not Detected    Staphylococcus aureus by PCR Not Detected Not Detected    Staphylococcus epidermidis by PCR Not Detected Not Detected    Staphylococcus lugdunensis by PCR Not Detected Not Detected    Staphylococcus species by PCR Not Detected Not Detected    Serratia marcescens by PCR Not Detected Not Detected    Streptococcus pneumoniae by PCR Not Detected Not Detected    Streptococcus pyogenes  by PCR Not Detected Not Detected    Streptococcus species by PCR Not Detected Not Detected    Stenotrophomonas maltophilia by PCR Not Detected Not Detected    Candida albicans by PCR Not Detected Not Detected    Candida auris by PCR Not Detected Not Detected    Candida glabrata by PCR Not Detected Not Detected    Candida krusei by PCR Not Detected Not Detected    Candida parapsilosis by PCR Not Detected Not Detected    Candida tropicalis by PCR Not Detected Not Detected    Cryptococcus neoformans/gattii by PCR Not Detected Not Detected    Cephalosporin Resistance CTX-M gene by PCR DETECTED (AA) Not Detected    Carbapenem Resistance IMP gene by PCR Not Detected Not Detected    Carbapenem Resistance KPC by PCR Not Detected Not Detected    Colistin Resistance mcr-1 gene by PCR Not Detected Not Detected    Carbapenem Resistance NDM gene by PCR Not Detected Not Detected    Carbapenem Resistance OXA-48 gene by PCR Not Detected Not Detected    Carbapenem Resistance VIM gene Detected Not Detected Not Detected   Platelet Confirmation   Result Value Ref Range    Platelet Confirmation CONFIRMED    Lactic Acid   Result Value Ref Range    Lactic Acid 3.2 (H) 0.5 - 2.2 mmol/L   Lactic Acid   Result Value Ref Range    Lactic Acid 3.0 (H) 0.5 - 2.2 mmol/L   Basic Metabolic Panel   Result Value Ref Range    Sodium 141 132 - 146 mmol/L    Potassium 2.9 (L) 3.5 - 5.0 mmol/L    Chloride 108 (H) 98 - 107 mmol/L    CO2 23 22 - 29 mmol/L    Anion Gap 10 7 - 16 mmol/L    Glucose 198 (H) 74 - 99 mg/dL    BUN 27 (H) 6 - 23 mg/dL    CREATININE 1.0 0.5 - 1.0 mg/dL    GFR Non-African American 55 >=60 mL/min/1.73    GFR African American >60     Calcium 7.7 (L) 8.6 - 10.2 mg/dL   CBC   Result Value Ref Range    WBC 26.3 (H) 4.5 - 11.5 E9/L    RBC 3.00 (L) 3.50 - 5.50 E12/L    Hemoglobin 8.4 (L) 11.5 - 15.5 g/dL    Hematocrit 25.7 (L) 34.0 - 48.0 %    MCV 85.7 80.0 - 99.9 fL    MCH 28.0 26.0 - 35.0 pg    MCHC 32.7 32.0 - 34.5 %    RDW 16.3 (H) 11.5 - 15.0 fL    Platelets 76 (L) 130 - 450 E9/L    MPV NOT CALC 7.0 - 12.0 fL   Troponin   Result Value Ref Range    Troponin, High Sensitivity 29 (H) 0 - 9 ng/L   Lactic Acid   Result Value Ref Range    Lactic Acid 1.8 0.5 - 2.2 mmol/L   Brain Natriuretic Peptide   Result Value Ref Range    Pro-BNP 3,788 (H) 0 - 125 pg/mL   Blood Gas, Arterial   Result Value Ref Range    Date Analyzed 20220501     Time Analyzed 2345     Source: Blood Arterial     pH, Blood Gas 7.531 (H) 7.350 - 7.450    PCO2 28.5 (L) 35.0 - 45.0 mmHg    PO2 170.1 (H) 75.0 - 100.0 mmHg    HCO3 23.3 22.0 - 26.0 mmol/L    B.E. 1.1 -3.0 - 3.0 mmol/L    O2 Sat 99.0 (H) 92.0 - 98.5 %    O2Hb 98.3 (H) 94.0 - 97.0 %    COHb 0.4 0.0 - 1.5 %    MetHb 0.3 0.0 - 1.5 %    O2 Content 13.1 mL/dL    HHb 1.0 0.0 - 5.0 %    tHb (est) 9.2 (L) 11.5 - 16.5 g/dL    Mode RA     Date Of Collection      Time Collected      Pt Temp 37.0 C     ID [id number]     Lab [id number]     Critical(s) Notified . No Critical Values    Platelet Confirmation   Result Value Ref Range    Platelet Confirmation CONFIRMED    Procalcitonin   Result Value Ref Range    Procalcitonin 0.89 (H) 0.00 - 0.08 ng/mL   Magnesium   Result Value Ref Range    Magnesium 1.8 1.6 - 2.6 mg/dL   Phosphorus   Result Value Ref Range    Phosphorus 1.7 (L) 2.5 - 4.5 mg/dL   POCT Glucose   Result Value Ref Range    Glucose 113 mg/dL    QC OK?  yes    POCT Glucose   Result Value Ref Range    Meter Glucose 113 (H) 74 - 99 mg/dL   POCT Glucose   Result Value Ref Range    Meter Glucose 177 (H) 74 - 99 mg/dL   EKG 12 Lead   Result Value Ref Range    Ventricular Rate 109 BPM    Atrial Rate 109 BPM    P-R Interval 146 ms    QRS Duration 102 ms    Q-T Interval 392 ms    QTc Calculation (Bazett) 527 ms    P Axis 10 degrees    R Axis -57 degrees    T Axis 146 degrees   EKG 12 Lead   Result Value Ref Range    Ventricular Rate 106 BPM    Atrial Rate 106 BPM    P-R Interval 126 ms    QRS Duration 104 ms    Q-T Interval 382 ms    QTc Calculation (Bazett) 507 ms    P Axis 68 degrees    R Axis -55 degrees    T Axis 118 degrees       RADIOLOGY:  XR CHEST (2 VW)   Final Result   Cardiomegaly without signs of congestion         CT Head WO Contrast   Final Result   No acute intracranial abnormality. There is age-appropriate atrophy and   small-vessel ischemic disease. CT chest.      There is borderline cardiomegaly. There is mediastinal lipomatosis. There   is mild ectasia of the ascending thoracic aorta measuring 3.7 x 3.5 cm. The   trachea and major bronchi are patent. Nonenlarged mediastinal lymph nodes   are present. There is mild peribronchial thickening concerning for   bronchitis with minimal atelectasis in the lung bases. There is no focal   consolidation or pleural effusion. Liver is of normal architecture. Bilateral adrenal nodules are present with larger 1 on the right side   measuring 2.3 x 3.9 cm degenerative changes are identified in the thoracic   spine. Impression      Mild cardiomegaly with bronchitis and atelectasis in the lung bases. There   is no focal consolidation or edema. Bilateral adrenal nodules. RECOMMENDATIONS:   Unavailable         CT CHEST WO CONTRAST   Final Result   No acute intracranial abnormality. There is age-appropriate atrophy and   small-vessel ischemic disease. CT chest.      There is borderline cardiomegaly. There is mediastinal lipomatosis. There   is mild ectasia of the ascending thoracic aorta measuring 3.7 x 3.5 cm. The   trachea and major bronchi are patent. Nonenlarged mediastinal lymph nodes   are present. There is mild peribronchial thickening concerning for   bronchitis with minimal atelectasis in the lung bases. There is no focal   consolidation or pleural effusion. Liver is of normal architecture. Bilateral adrenal nodules are present with larger 1 on the right side   measuring 2.3 x 3.9 cm degenerative changes are identified in the thoracic   spine.       Impression      Mild cardiomegaly with bronchitis and atelectasis in the lung bases. There   is no focal consolidation or edema. Bilateral adrenal nodules. RECOMMENDATIONS:   Unavailable         XR CHEST PORTABLE   Final Result   Rotational artifact with either artifactual prominence of the right hilum or   a right hilar mass. Recommend follow-up with two view chest radiograph or   chest CT to exclude a neoplastic process. ------------------------- NURSING NOTES AND VITALS REVIEWED ---------------------------  Date / Time Roomed:  4/30/2022 12:57 PM  ED Bed Assignment:  [id number]    The nursing notes within the ED encounter and vital signs as below have been reviewed.      Patient Vitals for the past 24 hrs:   BP Temp Temp src Pulse Resp SpO2 Weight   05/02/22 0705 (!) 109/92 -- -- 88 13 93 % --   05/02/22 0700 (!) 79/69 -- -- 93 18 95 % --   05/02/22 0600 (!) 88/53 -- -- 96 18 96 % --   05/02/22 0532 104/65 -- -- 95 26 97 % --   05/02/22 0500 (!) 161/144 -- -- 111 19 96 % --   05/02/22 0400 (!) 98/56 98.4 °F (36.9 °C) Axillary 114 20 97 % --   05/02/22 0300 104/64 -- -- 99 18 97 % --   05/02/22 0230 115/65 98.4 °F (36.9 °C) -- 98 18 98 % --   05/02/22 0215 109/60 -- -- 105 18 97 % --   05/02/22 0200 (!) 104/58 -- -- 100 17 97 % --   05/02/22 0145 (!) 96/56 -- -- 100 18 98 % --   05/02/22 0130 (!) 96/57 -- -- 98 18 97 % --   05/02/22 0115 (!) 99/56 -- -- 102 21 98 % --   05/02/22 0100 95/62 -- -- 97 20 98 % --   05/02/22 0045 113/61 -- -- 104 18 95 % --   05/02/22 0030 100/60 -- -- 127 23 97 % --   05/02/22 0000 87/61 98.8 °F (37.1 °C) Axillary 110 25 97 % --   05/01/22 2349 -- -- -- -- -- -- 209 lb 6.4 oz (95 kg)   05/01/22 2345 95/73 98.8 °F (37.1 °C) Axillary 106 18 98 % --   05/01/22 2206 (!) 73/40 99.1 °F (37.3 °C) -- 104 -- -- --   05/01/22 2130 96/62 -- -- 101 -- -- --   05/01/22 2027 (!) 84/48 98.4 °F (36.9 °C) Axillary 109 20 98 % --   05/01/22 1513 112/66 99 °F (37.2 °C) Temporal 98 20 96 % -- 05/01/22 0800 118/60 97.8 °F (36.6 °C) Temporal 100 18 98 % --       Oxygen Saturation Interpretation: Normal    ------------------------------------------ PROGRESS NOTES ------------------------------------------  Re-evaluation(s):  Time: 6988  Patients symptoms show no change  Repeat physical examination is not changed    Counseling:  I have spoken with the patient and discussed todays results, in addition to providing specific details for the plan of care and counseling regarding the diagnosis and prognosis. Their questions are answered at this time and they are agreeable with the plan of admission.    --------------------------------- ADDITIONAL PROVIDER NOTES ---------------------------------  Consultations:  Time: 1832. Spoke with Dr. Denzel Rodgers. Discussed case. They will admit the patient. This patient's ED course included: a personal history and physicial examination, multiple bedside re-evaluations, IV medications and cardiac monitoring    This patient has remained hemodynamically stable during their ED course. Diagnosis:  1. Acute metabolic encephalopathy    2. Complicated UTI (urinary tract infection)    3. Nursing home resident        Disposition:  Patient's disposition: Admit to telemetry  Patient's condition is stable.     Patient was seen and evaluated by both myself and Agnes Garcia, [facility],   Resident  05/02/22 5790

## 2022-04-30 NOTE — ED NOTES
Unable to obtain IV access x2 attempts. Another RN to attempt with ultrasound.       Demian Brandt, AIDA  04/30/22 4736

## 2022-04-30 NOTE — ED NOTES
Multiple attempts to place PIV and obtain blood cultures unsuccessful.  Will place consult for IV team.     Massiel Motley RN  04/30/22 5644

## 2022-04-30 NOTE — ED NOTES
N2N to 4SX AIDA Antonio.  Advised that pt currently in XR and will call over to have pt sent to floor after exam.     Ignacio Wang RN  04/30/22 9329

## 2022-04-30 NOTE — H&P
Hospital Medicine History & Physical      PCP: Patricia Caldera MD    Date of Admission: 4/30/2022    Date of Service:  4/30/2022    Chief Complaint:  AMS      History Of Present Illness:      Patient is a nursing home resident with chronic lizama ,presents with altered mental status. EMS reports nursing home states she was unresponsive except to pain and was disoriented. Patient was found to have inner tract infection in the ED and she was started on Rocephin  During my interview patient is awake alert oriented x2-3  She has no complain, she feels significantly better, no pain or discomfort  Patient denies any chest pain shortness of breath, abdominal pain nausea vomiting, headache or visual changes or lightheadedness, weakness or numbness or tingling in the extremities. Patient has a chronic Lizama? Past Medical History:          Diagnosis Date    Acute kidney failure ([address]     Anemia     Anxiety     Cardiomyopathy ([address] 3/24/2014    Chlamydia     Chronic back pain     Depression     Difficulty in walking     Enlarged heart     Essential hypertension     Genital warts     Headache(784.0)     Heart failure ([address]     Hernia 3/2014    Hyperlipidemia     Hypertension     Malignant neoplasm of endometrium (HCC)     Neuropathy     Obesity     Osteoarthritis     Polio     Post traumatic stress disorder     Postmenopausal vaginal bleeding     Urinary incontinence        Past Surgical History:          Procedure Laterality Date    BREAST SURGERY      left benign cyst    BREAST SURGERY  7/86    CATARACT REMOVAL      COLONOSCOPY  3/1/2014    DILATION AND CURETTAGE OF UTERUS  04/29/2014    EYE SURGERY  9/2003 , 4/2014    HYSTEROSCOPY  04/29/2014    WISDOM TOOTH EXTRACTION         Medications Prior to Admission:      Prior to Admission medications    Medication Sig Start Date End Date Taking?  Authorizing Provider   mirtazapine (REMERON) 15 MG tablet Take 7.5 mg by mouth nightly Historical Provider, MD   fluticasone (FLONASE) 50 MCG/ACT nasal spray 2 sprays by Each Nostril route daily    Historical Provider, MD   cyanocobalamin 1000 MCG/ML injection Inject 1,000 mcg into the muscle every 30 days    Historical Provider, MD   melatonin 3 MG TABS tablet Take 6 mg by mouth at bedtime    Historical Provider, MD   ergocalciferol (ERGOCALCIFEROL) 1.25 MG (05423 UT) capsule Take 50,000 Units by mouth once a week Thursday    Historical Provider, MD   magnesium oxide (MAG-OX) 400 MG tablet Take 400 mg by mouth daily    Historical Provider, MD   ferrous sulfate (IRON 325) 325 (65 Fe) MG tablet Take 325 mg by mouth every other day    Historical Provider, MD   Sodium Phosphates (FLEET) 7-19 GM/118ML Place 1 enema rectally once as needed    Historical Provider, MD   magnesium hydroxide (MILK OF MAGNESIA) 400 MG/5ML suspension Take by mouth daily as needed for Constipation    Historical Provider, MD   furosemide (LASIX) 40 MG tablet Take 40 mg by mouth daily    Historical Provider, MD   oxybutynin (DITROPAN-XL) 10 MG extended release tablet Take 10 mg by mouth daily    Historical Provider, MD   potassium chloride (KLOR-CON) 20 MEQ packet Take 20 mEq by mouth daily    Historical Provider, MD   pantoprazole sodium (PROTONIX) 40 MG PACK packet Take 40 mg by mouth every morning (before breakfast)    Historical Provider, MD   senna (SENOKOT) 8.6 MG tablet Take 1 tablet by mouth daily    Historical Provider, MD   simvastatin (ZOCOR) 10 MG tablet Take 10 mg by mouth nightly    Historical Provider, MD   acetaminophen 650 MG TABS Take 650 mg by mouth every 4 hours as needed. 2/10/15   Hailey Gaviria DO   clopidogrel (PLAVIX) 75 MG tablet Take 1 tablet by mouth daily. 2/10/15   Hailey Gaviria DO   sertraline (ZOLOFT) 50 MG tablet Take 1 tablet by mouth daily. 10/7/14   Bibiana Huggins DO   tolterodine (DETROL LA) 4 MG ER capsule Take 1 capsule by mouth daily.  10/7/14   Bibiana Huggins DO   FIBER SELECT GUMMIES PO Take  by mouth. Two gummies qa    Historical Provider, MD       Allergies:  Patient has no known allergies. Social History:      The patient currently lives home    TOBACCO:   reports that she has never smoked. She has never used smokeless tobacco.  ETOH:   reports no history of alcohol use. Family History:      Reviewed in detail and negative for DM, CAD, Cancer, CVA. Positive as follows:        Problem Relation Age of Onset    Cancer Mother         ovarian cancer    Heart Disease Mother         CHF   [facility] Other Mother         Hepatitis    Arthritis Mother     Diabetes Mother     High Blood Pressure Mother     High Cholesterol Mother     Kidney Disease Mother     Obesity Mother     Diabetes Father     Other Father         Cirrohis of liver and Black Lung    Alcohol Abuse Father     Arthritis Father     High Blood Pressure Father     High Cholesterol Father     Obesity Father     Arthritis Brother     High Blood Pressure Brother     High Cholesterol Brother     Arthritis Maternal Aunt     High Blood Pressure Maternal Aunt     Stroke Maternal Aunt     Arthritis Maternal Uncle     Heart Disease Maternal Uncle     Stroke Maternal Uncle     Arthritis Paternal Uncle     High Blood Pressure Paternal Uncle     High Cholesterol Paternal Uncle     Stroke Paternal Uncle     Diabetes Maternal Grandmother     Heart Disease Maternal Grandmother     Diabetes Paternal Grandmother        REVIEW OF SYSTEMS:   Pertinent positives as noted in the HPI. All other systems reviewed and negative. PHYSICAL EXAM:    /84   Pulse 101   Temp 98.8 °F (37.1 °C)   Resp 20   SpO2 99%     General appearance:  No apparent distress, appears stated age and cooperative. HEENT:  Normal cephalic, atraumatic without obvious deformity. Pupils equal, round, and reactive to light. Extra ocular muscles intact. Conjunctivae/corneas clear. Neck: Supple, with full range of motion.  No jugular venous distention. Trachea midline. Respiratory:  Normal respiratory effort. Clear to auscultation, bilaterally without Rales/Wheezes/Rhonchi. Cardiovascular:  Regular rate and rhythm with normal S1/S2 without murmurs, rubs or gallops. Abdomen: Soft, non-tender, non-distended with normal bowel sounds. Musculoskeletal:  No clubbing, cyanosis or edema bilaterally. Full range of motion without deformity. Skin: Skin color, texture, turgor normal.  No rashes or lesions. Neurologic:  Neurovascularly intact without any focal sensory/motor deficits. Cranial nerves: II-XII intact, grossly non-focal.  Psychiatric:  Alert and oriented, thought content appropriate, normal insight      Labs:     Recent Labs     04/30/22  1327   WBC 19.5*   HGB 10.6*   HCT 33.2*   *     Recent Labs     04/30/22  1327      K 3.3*      CO2 25   BUN 23   CREATININE 0.8   CALCIUM 8.4*     Recent Labs     04/30/22  1327   AST 12   ALT 8   BILITOT 0.5   ALKPHOS 121*     No results for input(s): INR in the last 72 hours. No results for input(s): Connee Matar in the last 72 hours. Urinalysis:      Lab Results   Component Value Date    NITRU POSITIVE 04/30/2022    WBCUA PACKED 04/30/2022    BACTERIA MANY 04/30/2022    RBCUA PACKED 04/30/2022    BLOODU LARGE 04/30/2022    SPECGRAV 1.010 04/30/2022    GLUCOSEU Negative 04/30/2022         ASSESSMENT:    Active Hospital Problems    Diagnosis Date Noted    Acute metabolic encephalopathy [C55.51] 04/30/2022     Priority: Medium       PLAN:    --Altered mental status, metabolic encephalopathy likely secondary to urinary tract infection: CT head negative, UA is positive patient has significant leukocytosis, she was initially tachycardic in the ED that resolved. IV Rocephin, await cultures. No concerns for sepsis    --UTI, IV antibiotics and await cultures as above    --Hypokalemia, electrolyte imbalance likely due to decreased p.o. intake.   Replace potassium and recheck electrolytes. --Abnormal chest x-ray:Rotational artifact with either artifactual prominence of the right hilum or  a right hilar mass.  Recommend follow-up with two view chest radiograph or  chest CT to exclude a neoplastic process  Repeat two-view pending    -- CHF, not in acute exacerbation, continue home Lasix      -- Dyslipidemia, continue home statin    --History of chronic back pain and impaired mobility. PT OT consult    DVT Prophylaxis:   Diet: No diet orders on file  Code Status: Prior      Dispo -admit . Nanette Donovan MD  4/30/2022    Thank you Kranthi Green MD for the opportunity to be involved in this patient's care. If you have any questions or concerns please feel free to contact me through [address] [place] [address].

## 2022-04-30 NOTE — LETTER
[place] Department Medicaid  CERTIFICATION OF NECESSITY  FOR NON-EMERGENCY TRANSPORTATION   BY GROUND AMBULANCE      Individual Information   1. Name: Alayna Darnell 2. [place] Medicaid Billing Number:    3. Address: [facility] [address]      Transportation Provider Information   4. Provider Name: LETY   5. [place] Medicaid Provider Number:  National Provider Identifier (NPI):      Certification  7. Criteria:  During transport, this individual requires:  [x] Medical treatment or continuous     supervision by an EMT. [] The administration or regulation of oxygen by another person. [] Supervised protective restraint. 8. Period Beginning Date: 5/2/2022   9. Length  [] Not more than 10 day(s)  [] One Year     Additional Information Relevant to Certification   10. Comments or Explanations, If Necessary or Appropriate     Cognitive risk, requires safety monitoring for safety     Certifying Practitioner Information   11. Name of Practitioner:    12. [place] Medicaid Provider Number, If Applicable:  [address] Provider Identifier (NPI):      Signature Information   14. Date of Signature: 5/2/2022 15. Name of Person Signing: Electronically signed by Jackson Orellana RN on 5/2/2022 at 2:39 PM     16. Signature and Professional Designation: Dr. Bushra Malone     OD [id number]  Rev. 7/2015       [address] [place] [address] Encounter Date/Time: 4/30/2022 [address] [place] [address] Account: [de-identified]    MRN: [id number]    Patient: Alayna Darnell    Contact Serial #: [id number]      ENCOUNTER          Patient Class: I Private Enc?   No Unit RM BD: SEYZ 4WE CMI [id number]   Hospital Service: MED   Encounter DX: Complicated UTI (urinary*   ADM Provider: Duke Bowens MD   Procedure:     ATT Provider: Randell Craig DO   REF Provider:        Admission DX: Complicated UTI (urinary tract infection), Nursing home resident, Acute metabolic encephalopathy and DX codes: N39.0, Z59.3, G93.41      PATIENT Name: Bibiana Rivera : 1955 (79 yrs)   Address: [facility]  [facility], [address] Sex: Female   City: [place] [address]         Marital Status: Single   Employer: DISABLED         Religious: [employer]   Primary Care Provider: Marchelle Aschoff, MD         Primary Phone: [phone]   EMERGENCY CONTACT   Contact Name Legal Guardian? Relationship to Patient Home Phone Work Phone   1. Chelsea Davalos  2. *No Contact Specified*      Other    [phone]                 GUARANTOR            Guarantor: Bibiana Rivera     : 1955   Address: [facility] Sex: Female     [place] [address]     Relation to Patient: Self       Home Phone: [phone]   Guarantor ID: [id number]       Work Phone:     Guarantor Employer: DISABLED         Status: DISABLED      COVERAGE        PRIMARY INSURANCE   Payor: [employer] Plan: [employer]   Payor Address: [address] [place]  [place], [address] [place] [address]       Group Number:   Insurance Type: INDEMNITY   Subscriber Name: Manisha Alarcon : 1955   Subscriber ID: [id number] Pat. Rel. to Sub: Self   SECONDARY INSURANCE   Payor: [address] Address:  [address] [place] [address], [facility]          Group Number: [id number] Type: INDEMNITY   Subscriber Name: Manisha Alarcon : 1955   Subscriber ID: [id number] Pat.  Rel. to Sub: SELF           CSN: [id number]

## 2022-05-01 NOTE — CONSULTS
Department of Internal Medicine  Infectious Diseases   Consult Note      Reason for Consult: E coli bacteremia, sepsis       Requesting Physician:  Dr Richard Che:               This is a 79 yrs old female with hx of CHF, HTN, Endometrial cancer , polio, urinary incontinence with chronic indwelling Cobos catheter presented to the ER with change in mental status . She denied fever, chills , headache, chest pain, abdomen pain . WBC was 19 K , UA with pyuria and bacteriuria . She was given rocephin and then, cefepime .    Blood cx growing CTX M positive E coli       Past Medical History:      Past Medical History:   Diagnosis Date    Acute kidney failure ([address]     Anemia     Anxiety     Cardiomyopathy ([address] 3/24/2014    Chlamydia     Chronic back pain     Depression     Difficulty in walking     Enlarged heart     Essential hypertension     Genital warts     Headache(784.0)     Heart failure ([address]     Hernia 3/2014    Hyperlipidemia     Hypertension     Malignant neoplasm of endometrium (HCC)     Neuropathy     Obesity     Osteoarthritis     Polio     Post traumatic stress disorder     Postmenopausal vaginal bleeding     Urinary incontinence          Past Surgical History:      Past Surgical History:   Procedure Laterality Date    BREAST SURGERY      left benign cyst    BREAST SURGERY  7/86    CATARACT REMOVAL      COLONOSCOPY  3/1/2014    DILATION AND CURETTAGE OF UTERUS  04/29/2014    EYE SURGERY  9/2003 , 4/2014    HYSTEROSCOPY  04/29/2014    WISDOM TOOTH EXTRACTION           Current Medications:      Current Facility-Administered Medications   Medication Dose Route Frequency Provider Last Rate Last Admin    cefepime (MAXIPIME) 2000 mg IVPB minibag  2,000 mg IntraVENous Q12H Nura Barnes MD        clopidogrel (PLAVIX) tablet 75 mg  75 mg Oral Daily Nura Barnes MD   75 mg at 05/01/22 0838    [START ON 5/5/2022] vitamin D (ERGOCALCIFEROL) capsule 50,000 Units  50,000 Units Oral Once per day on Thu Maximus Littlejohn MD        ferrous sulfate (IRON 325) tablet 325 mg  325 mg Oral Every Other Day Bibi Herring MD   325 mg at 04/30/22 2126    fluticasone (FLONASE) 50 MCG/ACT nasal spray 2 spray  2 spray Each Nostril Daily Bibi Herring MD   2 spray at 05/01/22 0839    furosemide (LASIX) tablet 40 mg  40 mg Oral Daily Bibi Herring MD   40 mg at 05/01/22 0837    magnesium oxide (MAG-OX) tablet 400 mg  400 mg Oral Daily Bibi Herring MD   400 mg at 05/01/22 0838    oxybutynin (DITROPAN-XL) extended release tablet 10 mg  10 mg Oral Daily Bibi Herring MD   10 mg at 05/01/22 0838    pantoprazole (PROTONIX) tablet 40 mg  40 mg Oral QAM AC Maximus Littlejohn MD   40 mg at 05/01/22 0617    potassium bicarb-citric acid (EFFER-K) effervescent tablet 20 mEq  20 mEq Oral Daily Bibi Herring MD   20 mEq at 05/01/22 0838    sertraline (ZOLOFT) tablet 50 mg  50 mg Oral Daily Bibi Herring MD   50 mg at 05/01/22 0838    atorvastatin (LIPITOR) tablet 10 mg  10 mg Oral Daily Bibi Herring MD   10 mg at 05/01/22 0837    trospium (SANCTURA) tablet 20 mg  20 mg Oral Nightly Bibi Herring MD   20 mg at 04/30/22 2126    sodium chloride flush 0.9 % injection 5-40 mL  5-40 mL IntraVENous 2 times per day Bibi Herring MD   10 mL at 05/01/22 0838    sodium chloride flush 0.9 % injection 5-40 mL  5-40 mL IntraVENous PRN Bibi Herring MD        0.9 % sodium chloride infusion   IntraVENous PRN Bibi Herring MD        enoxaparin (LOVENOX) injection 40 mg  40 mg SubCUTAneous Daily Maximus Littlejohn MD   40 mg at 05/01/22 0838    ondansetron (ZOFRAN-ODT) disintegrating tablet 4 mg  4 mg Oral Q8H PRN Bibi Herring MD        Or    ondansetron (ZOFRAN) injection 4 mg  4 mg IntraVENous Q6H PRN Bibi Herring MD        polyethylene glycol (GLYCOLAX) packet 17 g  17 g Oral Daily PRN Bibi Herring MD        acetaminophen (TYLENOL) tablet 650 mg  650 mg Oral Q6H PRN Bibi Herring MD        Or   Emiliano acetaminophen (TYLENOL) suppository 650 mg  650 mg Rectal Q6H PRN Duke Bowens MD        ipratropium-albuterol (DUONEB) nebulizer solution 1 ampule  1 ampule Inhalation Q4H PRN Duke Bowens MD           Allergies:  Patient has no known allergies. Social History:      Social History     Socioeconomic History    Marital status: Single     Spouse name: Not on file    Number of children: Not on file    Years of education: Not on file    Highest education level: Not on file   Occupational History    Occupation:      Employer: [employer]     Comment: [employer]   Tobacco Use    Smoking status: Never Smoker    Smokeless tobacco: Never Used   Substance and Sexual Activity    Alcohol use: No     Comment: rarely    Drug use: No    Sexual activity: Not Currently   Other Topics Concern    Not on file   Social History Narrative    Not on file     Social Determinants of Health     Financial Resource Strain:     Difficulty of Paying Living Expenses: Not on file   Food Insecurity:     Worried About Running Out of Food in the Last Year: Not on file    Brittny of Food in the Last Year: Not on file   Transportation Needs:     Lack of Transportation (Medical): Not on file    Lack of Transportation (Non-Medical):  Not on file   Physical Activity:     Days of Exercise per Week: Not on file    Minutes of Exercise per Session: Not on file   Stress:     Feeling of Stress : Not on file   Social Connections:     Frequency of Communication with Friends and Family: Not on file    Frequency of Social Gatherings with Friends and Family: Not on file    Attends Mandaen Services: Not on file    Active Member of Clubs or Organizations: Not on file    Attends Club or Organization Meetings: Not on file    Marital Status: Not on file   Intimate Partner Violence:     Fear of Current or Ex-Partner: Not on file    Emotionally Abused: Not on file    Physically Abused: Not on file    Sexually Abused: Not on file   Housing Stability:     Unable to Pay for Housing in the Last Year: Not on file    Number of Places Lived in the Last Year: Not on file    Unstable Housing in the Last Year: Not on file         Family History:     Family History   Problem Relation Age of Onset    Cancer Mother         ovarian cancer    Heart Disease Mother         [facility] Other Mother         Hepatitis    Arthritis Mother     Diabetes Mother     High Blood Pressure Mother     High Cholesterol Mother     Kidney Disease Mother     Obesity Mother     Diabetes Father     Other Father         Cirrohis of liver and Black Lung    Alcohol Abuse Father     Arthritis Father     High Blood Pressure Father     High Cholesterol Father     Obesity Father     Arthritis Brother     High Blood Pressure Brother     High Cholesterol Brother     Arthritis Maternal Aunt     High Blood Pressure Maternal Aunt     Stroke Maternal Aunt     Arthritis Maternal Uncle     Heart Disease Maternal Uncle     Stroke Maternal Uncle     Arthritis Paternal Uncle     High Blood Pressure Paternal Uncle     High Cholesterol Paternal Uncle     Stroke Paternal Uncle     Diabetes Maternal Grandmother     Heart Disease Maternal Grandmother     Diabetes Paternal Grandmother        REVIEW OF SYSTEMS:    CONSTITUTIONAL: Denies fever or chills   HEENT: denies blurring of vision or double vision, denies hearing problem  RESPIRATORY: denies cough, shortness of breath, sputum expectoration  CARDIOVASCULAR:  Denies palpitation or chest pain   GASTROINTESTINAL:  Denies abdomen pain, diarrhea or constipation. GENITOURINARY:  Denies burning urination or frequency of urination  INTEGUMENT: denies wound , rash  HEMATOLOGIC/LYMPHATIC:  Denies lymph node swelling, gum bleeding or easy bruising.   MUSCULOSKELETAL:  Denies leg pain , joint pain , joint swelling  NEUROLOGICAL:  Change in mental status     PHYSICAL EXAM:      Vitals:     Vitals:    04/30/22 2000   BP: 120/64   Pulse: 88   Resp: 20   Temp: 97.7 °F (36.5 °C)   SpO2: 98%       General Appearance:    Awake, alert now , no acute distress. Head:    Normocephalic, atraumatic   Eyes:    No pallor, no icterus,   Ears:    No obvious deformity or drainage.    Nose:   No nasal drainage   Throat:   Mucosa moist, no oral thrush   Neck:   Supple, no lymphadenopathy   Back:     no CVA tenderness   Lungs:     Clear to auscultation bilaterally, no wheeze    Heart:    Regular rate and rhythm, no murmur   Abdomen:     Soft, non-tender, bowel sounds present    Extremities:   No edema, no cyanosis    Pulses:   Dorsalis pedis palpable    Skin:   no rashes       CBC with Differential:      Lab Results   Component Value Date    WBC 32.1 05/01/2022    RBC 4.10 05/01/2022    HGB 11.3 05/01/2022    HCT 35.9 05/01/2022    PLT 87 05/01/2022    MCV 87.6 05/01/2022    MCH 27.6 05/01/2022    MCHC 31.5 05/01/2022    RDW 16.5 05/01/2022    METASPCT 0.9 05/01/2022    LYMPHOPCT 2.6 05/01/2022    MONOPCT 0.8 05/01/2022    MYELOPCT 0.9 05/01/2022    BASOPCT 0.3 05/01/2022    MONOSABS 0.32 05/01/2022    LYMPHSABS 0.96 05/01/2022    EOSABS 0.00 05/01/2022    BASOSABS 0.00 05/01/2022       CMP     Lab Results   Component Value Date     05/01/2022    K 3.7 05/01/2022     05/01/2022    CO2 20 05/01/2022    BUN 25 05/01/2022    CREATININE 0.8 05/01/2022    GFRAA >60 05/01/2022    LABGLOM >60 05/01/2022    GLUCOSE 155 05/01/2022    PROT 6.7 05/01/2022    LABALBU 2.7 05/01/2022    CALCIUM 8.7 05/01/2022    BILITOT 0.4 05/01/2022    ALKPHOS 127 05/01/2022    AST 10 05/01/2022    ALT 9 05/01/2022         Hepatic Function Panel:    Lab Results   Component Value Date    ALKPHOS 127 05/01/2022    ALT 9 05/01/2022    AST 10 05/01/2022    PROT 6.7 05/01/2022    BILITOT 0.4 05/01/2022    LABALBU 2.7 05/01/2022       PT/INR:  No results found for: PROTIME, INR    TSH:    Lab Results   Component Value Date    TSH 2.770 02/08/2015       U/A:    Lab Results Component Value Date    COLORU OTHER 04/30/2022    PHUR 8.5 04/30/2022    LABCAST FEW 02/07/2015    WBCUA PACKED 04/30/2022    RBCUA PACKED 04/30/2022    BACTERIA MANY 04/30/2022    CLARITYU TURBID 04/30/2022    SPECGRAV 1.010 04/30/2022    LEUKOCYTESUR LARGE 04/30/2022    UROBILINOGEN 1.0 04/30/2022    BILIRUBINUR LARGE 04/30/2022    BLOODU LARGE 04/30/2022    GLUCOSEU Negative 04/30/2022    AMORPHOUS PRESENT 03/27/2022       ABG:  No results found for: OVG0LFI, BEART, U9CVSSHS, PHART, THGBART, KPK8BRK, PO2ART, JDC2RSL    MICROBIOLOGY:    Blood culture -    Enterobacteriaceae by PCR DETECTED Panic       Enterobacter cloacae complex by PCR Not Detected    Enterococcus faecalis by PCR Not Detected    Enterococcus faecium by PCR Not Detected    Escherichia coli by PCR DETECTED Panic      Haemophilus Influenzae by PCR Not Detected    Klebsiella aerogenes by PCR Not Detected    Klebsiella oxytoca by PCR Not Detected    Klebsiella pneumoniae group by PCR Not Detected    Listeria monocytogenes by PCR Not Detected    Neisseria meningitidis by PCR Not Detected    Proteus species by PCR Not Detected    Pseudomonas aeruginosa by PCR Not Detected    Salmonella species by PCR Not Detected    Streptococcus agalactiae by PCR Not Detected    Staphylococcus aureus by PCR Not Detected    Staphylococcus epidermidis by PCR Not Detected    Staphylococcus lugdunensis by PCR Not Detected    Staphylococcus species by PCR Not Detected    Serratia marcescens by PCR Not Detected    Streptococcus pneumoniae by PCR Not Detected    Streptococcus pyogenes  by PCR Not Detected    Streptococcus species by PCR Not Detected    Stenotrophomonas maltophilia by PCR Not Detected    Candida albicans by PCR Not Detected    Candida auris by PCR Not Detected    Candida glabrata by PCR Not Detected    Candida krusei by PCR Not Detected    Candida parapsilosis by PCR Not Detected    Candida tropicalis by PCR Not Detected    Cryptococcus neoformans/gattii by PCR Not Detected    Cephalosporin Resistance CTX-M gene by PCR DETECTED Panic      Carbapenem Resistance IMP gene by PCR Not Detected    Carbapenem Resistance KPC by PCR Not Detected    Colistin Resistance mcr-1 gene by PCR Not Detected    Carbapenem Resistance NDM gene by PCR Not Detected    Carbapenem Resistance OXA-48 gene by PCR Not Detected    Carbapenem Resistance VIM gene Detected Not Detected           Urine Culture - pending       Radiology :    Chest X ray : no infiltrates       IMPRESSION:     1 ESBL ( CTX M ) E coli bacteremia, sepsis   2. CAUTI ( Cobos catheter was changed )   3. Leukocytosis       RECOMMENDATIONS:      1. Stop cefepime   2. IV invanz 1 gram q 24 hrs  3. Contact isolation   4. Await final blood and urine cx   5.  CBC with diff     Thank you Dr Waldemar Dickerson for the consult

## 2022-05-01 NOTE — PLAN OF CARE
Problem: Discharge Planning  Goal: Discharge to home or other facility with appropriate resources  Outcome: Progressing     Problem: Skin/Tissue Integrity  Goal: Absence of new skin breakdown  Description: 1. Monitor for areas of redness and/or skin breakdown  2. Assess vascular access sites hourly  3. Every 4-6 hours minimum:  Change oxygen saturation probe site  4. Every 4-6 hours:  If on nasal continuous positive airway pressure, respiratory therapy assess nares and determine need for appliance change or resting period.   Outcome: Progressing     Problem: Safety - Adult  Goal: Free from fall injury  Outcome: Progressing     Problem: ABCDS Injury Assessment  Goal: Absence of physical injury  Outcome: Progressing     Problem: Chronic Conditions and Co-morbidities  Goal: Patient's chronic conditions and co-morbidity symptoms are monitored and maintained or improved  Outcome: Progressing

## 2022-05-01 NOTE — PROGRESS NOTES
Hospitalist Progress Note      Synopsis: Patient admitted on 4/30/2022   Patient is a nursing home resident with chronic lizama ,presents with altered mental status. EMS reports nursing home states she was unresponsive except to pain and was disoriented. Patient was found to have inner tract infection in the ED and she was started on Rocephin  During my interview patient is awake alert oriented x2-3  She has no complain, she feels significantly better, no pain or discomfort  Patient denies any chest pain shortness of breath, abdominal pain nausea vomiting, headache or visual changes or lightheadedness, weakness or numbness or tingling in the extremities. Patient has a chronic Lizama? Subjective  Patient seen and examined, chart reviewed. Reviewed overnight events. Patient overall feels better today, her white count has significantly increased up to 32.1, blood cultures gram-negative rods  Patient is tolerating diet with no abdominal pain nausea or vomiting, denies any blood in the urine or blood in the stool. Patient denies any chest pain or shortness of breath, lightheadedness, visual changes, weakness or numbness or tingling in extremities    Exam:  /64   Pulse 88   Temp 97.7 °F (36.5 °C) (Axillary)   Resp 20   Ht 5' 2\" (1.575 m)   Wt 201 lb 11.5 oz (91.5 kg)   SpO2 98%   BMI 36.90 kg/m²   General appearance: No apparent distress, appears stated age and cooperative. HEENT: Pupils equal, round, and reactive to light. Conjunctivae/corneas clear. Neck: Supple. No jugular venous distention. Trachea midline. Respiratory:  Normal respiratory effort. Clear to auscultation, bilaterally without Rales/Wheezes/Rhonchi. Cardiovascular: Regular rate and rhythm with normal S1/S2 without murmurs, rubs or gallops. Abdomen: Soft, non-tender, non-distended with normal bowel sounds. Musculoskeletal: No clubbing, cyanosis or edema bilaterally. Brisk capillary refill.  2+ lower extremity pulses (dorsalis pedis). Skin:  No rashes    Neurologic: awake, alert and following commands     Medications:  Reviewed    Infusion Medications    sodium chloride       Scheduled Medications    cefepime  2,000 mg IntraVENous Q12H    clopidogrel  75 mg Oral Daily    [START ON 5/5/2022] vitamin D  50,000 Units Oral Once per day on Thu    ferrous sulfate  325 mg Oral Every Other Day    fluticasone  2 spray Each Nostril Daily    furosemide  40 mg Oral Daily    magnesium oxide  400 mg Oral Daily    oxybutynin  10 mg Oral Daily    pantoprazole  40 mg Oral QAM AC    potassium bicarb-citric acid  20 mEq Oral Daily    sertraline  50 mg Oral Daily    atorvastatin  10 mg Oral Daily    trospium  20 mg Oral Nightly    sodium chloride flush  5-40 mL IntraVENous 2 times per day    enoxaparin  40 mg SubCUTAneous Daily     PRN Meds: sodium chloride flush, sodium chloride, ondansetron **OR** ondansetron, polyethylene glycol, acetaminophen **OR** acetaminophen, ipratropium-albuterol    I/O    Intake/Output Summary (Last 24 hours) at 5/1/2022 0930  Last data filed at 5/1/2022 8131  Gross per 24 hour   Intake --   Output 400 ml   Net -400 ml       Labs:   Recent Labs     04/30/22 1327 05/01/22  0744   WBC 19.5* 32.1*   HGB 10.6* 11.3*   HCT 33.2* 35.9   * 87*       Recent Labs     04/30/22  1327 05/01/22  0744    144   K 3.3* 3.7    104   CO2 25 20*   BUN 23 25*   CREATININE 0.8 0.8   CALCIUM 8.4* 8.7       Recent Labs     04/30/22  1327 05/01/22  0744   PROT 6.2* 6.7   ALKPHOS 121* 127*   ALT 8 9   AST 12 10   BILITOT 0.5 0.4       No results for input(s): INR in the last 72 hours. No results for input(s): [place] in the last 72 hours. Chronic labs:  Lab Results   Component Value Date    CHOL 199 01/29/2014    TRIG 81 01/29/2014    HDL 49 01/29/2014    LDLCALC 134 (H) 01/29/2014    TSH 2.770 02/08/2015       Radiology:  Imaging studies reviewed today.     ASSESSMENT:    Principal Problem:    Acute metabolic encephalopathy  Resolved Problems:    * No resolved hospital problems. *     --Altered mental status, metabolic encephalopathy likely secondary to urinary tract infection/bacteremia: CT head negative, UA is positive, blood cultures positive gram-negative rods, patient has significant leukocytosis, she was initially tachycardic in the ED that resolved. IV cefepime, await cultures. Consult infectious disease input appreciated   . no concerns for sepsis     --Bacteremia, gram-negative rods, IV antibiotics as above    --UTI, IV antibiotics and await cultures as above     --Hypokalemia, electrolyte imbalance likely due to decreased p.o. intake. Replace potassium and recheck electrolytes.     --Abnormal chest x-ray:Rotational artifact with either artifactual prominence of the right hilum or  a right hilar mass.  Recommend follow-up with two view chest radiograph or  chest CT to exclude a neoplastic process  Repeat two-view Cardiomegaly without signs of congestion     -- CHF, not in acute exacerbation, continue home Lasix     -- Dyslipidemia, continue home statin     --History of chronic back pain and impaired mobility. PT OT consult      Diet: ADULT DIET; Regular; Low Fat/Low Chol/High Fiber/2 gm Na; 1800 ml  Code Status: Limited  PT/OT Eval Status:     DVT Prophylaxis:     Recommended disposition at discharge:      +++++++++++++++++++++++++++++++++++++++++++++++++  Darren Elias MD  5/1/2022  [facility].  +++++++++++++++++++++++++++++++++++++++++++++++++  NOTE: This report was transcribed using voice recognition software. Every effort was made to ensure accuracy; however, inadvertent computerized transcription errors may be present.

## 2022-05-02 NOTE — PROGRESS NOTES
Peripheral IV attempt x2 RN failed despite use of vein finder. Resident notified and advised that nursing would also be unable to obtain labs. Stated he will place order for midline.

## 2022-05-02 NOTE — PROGRESS NOTES
Arrived to MICU. Per handoff report pt is difficult stick and was unable to establish another IV on RRT.

## 2022-05-02 NOTE — CARE COORDINATION
5/2:  Transition of care:  Pt presented to the ER from [facility] for AMS. Pt is being treated for UTI & had her chronic lizama changed out 5/1. 5/1 RRT pt was found unresponsive & hypotensive. Pt was transferred to MICU. Pt is on room air at 99%, Iv Fluids & Iv Invanz. Picc placed on 5/2.  2 D echo ordered. CM attempted to call [facility] for information & line was busy. Per [place] the [facility] pt is a long-term resident. Will need PT/OT & negative COVID test.  Will need to verify length of ATB with Dr. Julee Hawk. No pre-cert needed. HENNY update. Ambulance form started. Envelope in soft chart with COVID test.  SW/CARLOS MANUEL will continue to follow for dc planning.  Electronically signed by Martin Vital RN on 5/2/2022 at 2:37 PM

## 2022-05-02 NOTE — PROGRESS NOTES
79years old female, history of anxiety depression hypertension hyperlipidemia chronic diastolic congestive heart failure, who is a nursing home resident, she is a chronic Cobos, she presented with altered mental status, patient was noted to have urinary tract infection, and also noted to ESBL E. coli bacteremia, earlier seen by infection disease who started patient on Invanz, I was paged by patient going hypotensive, did not respond to initial fluids and midodrine dose, patient lactic acid is 3 map is 46, case discussed with intensive care unit attending, who is excepting patient for further management of sepsis and septic shock

## 2022-05-02 NOTE — PROGRESS NOTES
Hospitalist Progress Note      SYNOPSIS: Patient admitted on 2022 for Acute metabolic encephalopathy    Synopsis: Patient admitted on 2022   Patient is a nursing home resident with chronic lizama ,presents with altered mental status. EMS reports nursing home states she was unresponsive except to pain and was disoriented.   Patient was found to have inner tract infection in the ED and she was started on Rocephin  During my interview patient is awake alert oriented x2-3  She has no complain, she feels significantly better, no pain or discomfort  Patient denies any chest pain shortness of breath, abdominal pain nausea vomiting, headache or visual changes or lightheadedness, weakness or numbness or tingling in the extremities. Patient has a chronic Lizama indwelling    transferrede to micu due to hypotension  SUBJECTIVE:  Informed by nursing that IV access has been lost  Requesting permission for midline or PICC line placement  Unfortunately patient cannot provide consent for the procedure  And does not have a reliable family member or witness to ask consent    Temp (24hrs), Av.6 °F (37 °C), Min:98.1 °F (36.7 °C), Max:99.1 °F (37.3 °C)    DIET: Diet NPO  CODE: Limited    Intake/Output Summary (Last 24 hours) at 2022 1146  Last data filed at 2022 1000  Gross per 24 hour   Intake 4140.6 ml   Output 1749 ml   Net 2391.6 ml       OBJECTIVE:    BP (!) 84/51   Pulse 99   Temp 98.2 °F (36.8 °C) (Axillary)   Resp 14   Ht 5' 2\" (1.575 m)   Wt 209 lb 6.4 oz (95 kg)   SpO2 96%   BMI 38.30 kg/m²     General appearance: On room air not diaphoretic not in extremis  HEENT= edentulous  No visible thrush  No swelling to her lips or tongue  Respiratory:   No wheeze  Cardiovascular:  S1-S2 audible   abdomen: Without guarding or rigidity.      Neurologic: awake, and alert  Appears confused  She can tell me her name  But not her age the current month  She does know she is in [place]  ASSESSMENT:    hypotension improved with fluid bolus  Principal Problem on admissiom    Acute metabolic encephalopathy   Altered mental status, metabolic encephalopathy likely secondary to urinary tract infection/bacteremia    Bacteremia, gram-negative rods   pcr detected enterobacter                       E coli  uti uc ecoli  hypokalemia -improved    Anemia-chrnc  Nc    Low retic index suggests hypoproliferative  Thrombocytopenia- LDh nl range    tends to argue against end organ hypoperfusion   periph smear pending         --Abnormal chest x-ray  Work a and PICC assay at bnormal chest x-ray  L next a lot of electrolyte ack of iv access  Hypokalemia-resolved  CHF  HLD  History of chronic back pain and impaired mobility  Hx of polio  Hx of endometrial ca  Hx of uti in march 2022  Hx of intara abdomin infection  Dec 2021    PLAN:    In micu    Midodrine    Hemodynamic monitoring  Echocardiogram suggested    DISPOSITION: still in micu    Medications:  REVIEWED DAILY    Infusion Medications    sodium chloride      dextrose 5% and 0.45% NaCl with KCl 20 mEq 75 mL/hr at 05/02/22 1011    sodium chloride       Scheduled Medications    sodium chloride flush  5-40 mL IntraVENous 2 times per day    heparin flush  1 mL IntraVENous 2 times per day    white petrolatum   Topical Daily    miconazole   Topical BID    potassium phosphate IVPB  15 mmol IntraVENous Once    lidocaine PF  5 mL IntraDERmal Once    sodium chloride flush  5-40 mL IntraVENous 2 times per day    heparin flush  3 mL IntraVENous 2 times per day    calcium gluconate IVPB  1,000 mg IntraVENous Once    ertapenem (INVanz) IVPB  1,000 mg IntraVENous Q24H    pantoprazole  40 mg IntraVENous Daily    clopidogrel  75 mg Oral Daily    [START ON 5/5/2022] vitamin D  50,000 Units Oral Once per day on Thu    [Held by provider] ferrous sulfate  325 mg Oral Every Other Day    fluticasone  2 spray Each Nostril Daily    [Held by provider] furosemide  40 mg Oral Daily    [Held by provider] magnesium oxide  400 mg Oral Daily    [Held by provider] oxybutynin  10 mg Oral Daily    [Held by provider] potassium bicarb-citric acid  20 mEq Oral Daily    sertraline  50 mg Oral Daily    atorvastatin  10 mg Oral Daily    [Held by provider] trospium  20 mg Oral Nightly    enoxaparin  40 mg SubCUTAneous Daily     PRN Meds: acetaminophen **OR** acetaminophen, sodium chloride flush, sodium chloride, heparin flush, sodium chloride flush, sodium chloride, heparin flush, polyethylene glycol, ipratropium-albuterol    Labs:     Recent Labs     05/01/22  0744 05/01/22 2320 05/02/22  0900   WBC 32.1* 26.3* 20.5*   HGB 11.3* 8.4* 8.1*   HCT 35.9 25.7* 24.9*   PLT 87* 76* 70*       Recent Labs     05/01/22  0744 05/01/22 2320 05/02/22  0900    141 141  142   K 3.7 2.9* 4.1  3.9    108* 109*  108*   CO2 20* 23 23  23   BUN 25* 27* 26*  26*   CREATININE 0.8 1.0 1.0  1.0   CALCIUM 8.7 7.7* 7.6*  7.7*   PHOS  --  1.7*  --        Recent Labs     04/30/22  1327 05/01/22  0744 05/02/22  0900   PROT 6.2* 6.7 5.1*   ALKPHOS 121* 127* 109*   ALT 8 9 5   AST 12 10 6   BILITOT 0.5 0.4 0.3         Chronic labs:        +++++++++++++++++++++++++++++++++++++++++++++++++  DO Bao Thompson Physician - [address] [place] [address]  +++++++++++++++++++++++++++++++++++++++++++++++++  NOTE: This report was transcribed using voice recognition software. Every effort was made to ensure accuracy; however, inadvertent computerized transcription errors may be present.

## 2022-05-02 NOTE — PROGRESS NOTES
SPEECH LANGUAGE PATHOLOGY  DAILY PROGRESS NOTE        PATIENT NAME:  Vanesa Aly      :  1955          TODAY'S DATE:  2022 ROOM:  [address] [place] [address]    Order received. Chart reviewed. Pt unavailable at this time due to:  [] HOLD per RN  [] Off unit for testing/ procedure    [x] With medical staff  - procedure in room  x1  [] Declined intervention  [x] Sleeping/ Lethargic - pt unable to remain alert and not following commands x1  [] Other:     Will re-attempt at a later time/day as able. Thank you. Michael Del Real.  Dalton OLEA, [facility]. [id number]

## 2022-05-02 NOTE — PROGRESS NOTES
Bedside swallow study attempted. Patient aroused but unable to remain alert and did not follow commands.  Will attempt at later time in shift

## 2022-05-02 NOTE — SIGNIFICANT EVENT
Patient's BP was low 84/48, contacted Dr. Fletcher Yeung who ordered a 500cc NS bolus, Midodrine 10mg, and Lactic Acid. After bolus and medication pt's BP came up to 96/62. But then came back down to 73/40, Map 52. Per Dr. Fletcher Yeung patient was to be transferred to MICU. While waiting for transfer, patient's condition was not improving, so RRT was called. BP was 69/47. Patient was transferred to MICU, Dr. Allyson Rodriguez stated that he would call the family and give an update. Nurse to nurse report was given at bedside to [facility] THERESE BAUTISTA.

## 2022-05-02 NOTE — PLAN OF CARE
Problem: Discharge Planning  Goal: Discharge to home or other facility with appropriate resources  Outcome: Not Progressing     Problem: Skin/Tissue Integrity  Goal: Absence of new skin breakdown  Description: 1. Monitor for areas of redness and/or skin breakdown  2. Assess vascular access sites hourly  3. Every 4-6 hours minimum:  Change oxygen saturation probe site  4. Every 4-6 hours:  If on nasal continuous positive airway pressure, respiratory therapy assess nares and determine need for appliance change or resting period.   Outcome: Not Progressing     Problem: Safety - Adult  Goal: Free from fall injury  Outcome: Progressing     Problem: ABCDS Injury Assessment  Goal: Absence of physical injury  Outcome: Progressing     Problem: Chronic Conditions and Co-morbidities  Goal: Patient's chronic conditions and co-morbidity symptoms are monitored and maintained or improved  Outcome: Progressing     Problem: Pain  Goal: Verbalizes/displays adequate comfort level or baseline comfort level  Outcome: Progressing     Problem: Neurosensory - Adult  Goal: Achieves stable or improved neurological status  Outcome: Progressing    Problem: Genitourinary - Adult  Goal: Urinary catheter remains patent  Outcome: Progressing

## 2022-05-02 NOTE — PROGRESS NOTES
Patient passed bedside swallow, however still is intermittently arousable. Improved mentation compared to beginning of shift, now alert to person and place.  Care team notified

## 2022-05-02 NOTE — SIGNIFICANT EVENT
Rapid Response Team Note  Date of event: 5/2/2022   Time of event: 2307  Stephie Lara 79y.o. year old female   YOB: 1955   Admit date:  4/30/2022   [facility]: [facility]   Witnessed? : [x]Yes  [] No  Monitored? : [x]Yes  [] No  Code status: [] Full  [] DNR-CCA  []LIMITED CODE (No intubation, no CPR)  ______________________________________________________________________  Reason for RRT:    [] RR < 8     [] RR > 28   [] SpO2 <90%   [] HR < 40 bpm   [] HR > 130 bpm  [x] SBP < 90 mmHg    [] SpO2 <90%   [x] LOC   [] Seizures    [] Significant Bleeding Event    [] Other:     Subjective:   CTSP regarding the above event, patient was found to be hypotensive - BP 83/66. Bedside nursing staff had administered 1.5L bolus prior to the RRT team assessment. On arrival, patient was found to be lethargic with MAP's consistently between 63-70 mmHg. Concern for possible septic shock. Not oriented to place but oriented to year and close on month (Stated it was April).      Objective:   Vital signs: Temp: 98.4/BP: 83/66/RR: 18/HR: 106  Initial Condition:  Conscious   [x] Yes  [] No     Breathing [x] Yes  [] No     Pulse  [x] Yes  [] No    Airway:   [x] Open/ Clear     Intervention: [x] None  [] Pooled secretions     [] Suctioned  [] Stridor      [] Intubation    Lungs:   [x] Symmetrical chest rise/ CTABL Intervention: [] None  [] Use of accessory muscles    [] NIV (CPAP/BiPAP)  [] Cyanosis      [] Nasal Oxygen/Mask  [] Wheezing       [x] ABG             [] CXR  [] Other:     Circulation:   Rhythm:  [x] Sinus [] Other:    Intervention: [] None            [x] IV Access  [] Peripheral              [] Central            [x] EKG            [] Cardioversion            [] Defibrillation     Capillary Refill:  [x] > 2 seconds [] < 2 seconds    Neurologic:        [x] Pupillary Response:  PERRL   Response to pain:   [x] Yes  [] No  Follow commands:  [x] Yes  [] No  Facial asymmetry:  [] Yes  [x] No  Motor strength:  [] Equal [x] Focal deficit: Lt. Baseline deficit  Diagnostic Test:  Blood Sugar:  177 mg/dL  EKG:    Unchanged from baseline  ABG:      Lab Results   Component Value Date    PH 7.531 05/01/2022    PCO2 28.5 05/01/2022    PO2 170.1 05/01/2022    HCO3 23.3 05/01/2022    O2SAT 99.0 05/01/2022     Medication(s) Given:  []  Narcan (Naloxone)   []  Romazicon (Flumazenil)    []  Breathing Treatment    []  Steroids (Prednisone, Solu-Medrol)  []  Adenosine  [] Cardiac Medicines:     Infusion(s):   [] Normal Saline    Amount:  cc/hr      [] Lactate Ringers      [] Other:     Imaging:   [] CXR:   [] Normal         [] Pneumothorax         [] Pulmonary Edema  [] Infiltrate          [] CT Head  [] Normal          [] ICB          [] [facility]          [] Other:     Laboratory Tests:     CBC with Differential:    Lab Results   Component Value Date    WBC 26.3 05/01/2022    RBC 3.00 05/01/2022    HGB 8.4 05/01/2022    HCT 25.7 05/01/2022    PLT 76 05/01/2022    MCV 85.7 05/01/2022    MCH 28.0 05/01/2022    MCHC 32.7 05/01/2022    RDW 16.3 05/01/2022    METASPCT 0.9 05/01/2022    LYMPHOPCT 2.6 05/01/2022    MONOPCT 0.8 05/01/2022    MYELOPCT 0.9 05/01/2022    BASOPCT 0.3 05/01/2022    MONOSABS 0.32 05/01/2022    LYMPHSABS 0.96 05/01/2022    EOSABS 0.00 05/01/2022    BASOSABS 0.00 05/01/2022     BMP:    Lab Results   Component Value Date     05/01/2022    K 2.9 05/01/2022    K 3.7 05/01/2022     05/01/2022    CO2 23 05/01/2022    BUN 27 05/01/2022    LABALBU 2.7 05/01/2022    CREATININE 1.0 05/01/2022    CALCIUM 7.7 05/01/2022    GFRAA >60 05/01/2022    LABGLOM 55 05/01/2022    GLUCOSE 198 05/01/2022     Troponin:    Lab Results   Component Value Date    TROPONINI <0.01 02/07/2015     ABG:    Lab Results   Component Value Date    PH 7.531 05/01/2022    PCO2 28.5 05/01/2022    PO2 170.1 05/01/2022    HCO3 23.3 05/01/2022    BE 1.1 05/01/2022    O2SAT 99.0 05/01/2022         Teams Assessment and Plan:  1.  Sepsis with concern for septic shock vs hypovolemic shock  2. Dehydration  · S/P 1.5L bolus prior to RRT (which is 30cc/kg for her)  · Transfer to ICU  · NICOM planned  ? ?  ? Disposition:  [] No transfer   [] Transfer to monitor floor  [x] Transfer to: [x] MICU [] NICU [] CCU [] SICU    Patients family updated: [] Yes  [x] No - attempted to call family listed in chart - apparently has no family.     Discussed with:  [x] Critical Care Intensivist: Dr. Gayla Rolle,       [] Primary Care Provider: Marla Hunter MD      [x] Other: Dr. Wil Casas MD?    Juan José Rowe MD, PGY- 3  5/2/2022 11:42 PM  Attending Physician: Dr. Sha Lopez

## 2022-05-02 NOTE — PROCEDURES
NICOM with 250mL NS bolus results as follows:       SVI CI HR TFC MAP BP Type TPRI   Baseline 28 3.0 109 26.5 - - -   Challenge 32 3.5 109 27.2 - - -   %change 14.5% 14.4% -0.1% 2.7% - - -     Fluid Responsive

## 2022-05-02 NOTE — PROGRESS NOTES
Attempted to call emergency contact \"Chelsea\" listed as friend [phone] rings busy. Per transfer form from SNF pt has no family.

## 2022-05-02 NOTE — PROGRESS NOTES
Physical Therapy    Physical Therapy Initial Assessment     Name: Claudia Fenton  : 1955  MRN: [id number]      Date of Service: 2022    Evaluating PT:  Uriah Rowan PT, DPT  [id number]     Room #:  7325/2398-Q  Diagnosis:  Complicated UTI (urinary tract infection) [N39.0]  Nursing home resident [P04.3]  Acute metabolic encephalopathy [N53.91]  PMHx/PSHx:  Acute kidney failure, anemia, anxiety, cardiomyopathy, chlamydia, chronic back pain, depression, difficulty walking, enlarged heart, essential HTN, HA, HF, hernia, HLD, HTN, malignant neoplasm of endometrium, neuropathy, obesity, OA, polio, PTSD, postmenopausal vagina bleeding, urinary incontinence     Precautions:  Falls, Contact isolation, B prevalon boots, Cognition   Equipment Needs:  NA    SUBJECTIVE:    Pt admitted from [facility] the [facility]. Unable to provide social history or PLOF at this time. OBJECTIVE:   Initial Evaluation  Date: 22 Treatment Short Term/ Long Term   Goals   AM-PAC 6 Clicks      Was pt agreeable to Eval/treatment? Partial      Does pt have pain? No reported pain at rest, unspecified pain with mobility      Bed Mobility  Rolling: Dep   Supine to sit: Dep x2  Sit to supine: Dep x2  Scooting: Dep   Rolling: Mod A  Supine to sit: Mod A  Sit to supine: Mod A  Scooting: Mod A   Transfers Sit to stand: NT  Stand to sit: NT  Stand pivot: NT  Sit to stand: Max A  Stand to sit: Max A  Stand pivot: Max A with AAD   Ambulation    NT  >3 feet with AAD Max A   Stair negotiation: ascended and descended  NT  NA   ROM BUE:  Per OT eval   BLE:  WFL     Strength BUE:  Per OT eval   RLE:  Grossly 2/5  LLE:  Grossly 2-/5     Balance Sitting EOB:  Mod A  Dynamic Standing:  NT  Sitting EOB:  SBA  Dynamic Standing:   Max A     Pt is A & O x 1  RASS:  -1  CAM-ICU:  Positive  Sensation:  Pt unable to report numbness and tingling to extremities  Edema:  Unremarkable    Vitals:  Blood Pressure at rest 115/61 mmHg  Blood Pressure post session 109/63 mmHg   Heart Rate at rest 81 bpm  Heart Rate post session 87 bpm    SPO2 at rest 100% on RA SPO2 post session 97% on RA         Functional Status Score-Intensive Care Unit (FSS-ICU)   Rolling 1/7   Supine to sit transfer 1/7   Unsupported sitting  3/7   Sit to stand transfers 0/7   Ambulation 0/7   Total  5/35     Therapeutic Exercises:    BLE PROM at EOB x10 reps     Patient education  Pt educated on PT role, safety during functional mobility    Patient response to education:   Pt verbalized understanding Pt demonstrated skill Pt requires further education in this area   no no Yes      ASSESSMENT:    Conditions Requiring Skilled Therapeutic Intervention:    [x]Decreased strength     []Decreased ROM  [x]Decreased functional mobility  [x]Decreased balance   [x]Decreased endurance   []Decreased posture  []Decreased sensation  []Decreased coordination   []Decreased vision  [x]Decreased safety awareness   [x]Increased pain       Comments:  Pt received supine and agreeable to PT evaluation with OT collaboration. Pt cleared for participation by RN prior to session. Vitals monitored during session. Pt initially shook head no to participate but when explained PT and OT would assist her to sit on EOB she shook her head yes. Assisted with trunk and LE with HOB raised to EOB. Pt required total assistance. She was minimally participative with activity and exercise at EOB. Assisted back to supine and scooted up. Placed in chair position. Pt left with call button in reach, lines attached, and needs met.     Treatment:  Patient practiced and was instructed in the following treatment:     Bed mobility training - pt given verbal and tactile cues to facilitate proper sequencing and safety during rolling and supine<>sit as well as provided with physical assistance to complete task     Sitting EOB for >5 minutes for upright tolerance, postural awareness and BLE ROM    Skilled positioning - Pt placed in the chair position with pillows utilized to facilitate upright posture, joint and skin integrity, and interaction with environment. Pt's/ family goals   1. None stated    Prognosis is guarded (PLOF unknown) for reaching above PT goals. Patient and or family understand(s) diagnosis, prognosis, and plan of care. ? PHYSICAL THERAPY PLAN OF CARE:    PT POC is established based on physician order and patient diagnosis     Referring provider/PT Order:  4/30/22, PT eval and treat, Nel Garcia MD  Diagnosis:  Complicated UTI (urinary tract infection) [N39.0]  Nursing home resident [E77.4]  Acute metabolic encephalopathy [F24.02]  Specific instructions for next treatment:  Progress activity as tolerated     Current Treatment Recommendations:     [x] Strengthening to improve independence with functional mobility   [] ROM to improve independence with functional mobility   [x] Balance Training to improve static/dynamic balance and to reduce fall risk  [x] Endurance Training to improve activity tolerance during functional mobility   [x] Transfer Training to improve safety and independence with all functional transfers   [x] Gait Training to improve gait mechanics, endurance and asses need for appropriate assistive device  [] Stair Training in preparation for safe discharge home and/or into the community   [x] Positioning to prevent skin breakdown and contractures  [x] Safety and Education Training   [x] Patient/Caregiver Education   [] HEP  [] Other     PT long term treatment goals are located in above grid    Frequency of treatments: 2-5x/week x 1-2 weeks. Time in  1335  Time out  1400    Total Treatment Time  10 minutes     Evaluation Time includes thorough review of current medical information, gathering information on past medical history/social history and prior level of function, completion of standardized testing/informal observation of tasks, assessment of data and education on plan of care and goals.     CPT codes:  [] Low Complexity PT evaluation 47486  [x] Moderate Complexity PT evaluation 81588  [] High Complexity PT evaluation [id number]  [] PT Re-evaluation [id number]  [] Gait training 36545 -- minutes  [] Manual therapy 81994 -- minutes  [x] Therapeutic activities 34610 10 minutes  [] Therapeutic exercises 37798 - minutes  [] Neuromuscular reeducation 15491 -- minutes     Marielena Guzman, PT, DPT  [id number]

## 2022-05-02 NOTE — DISCHARGE INSTR - COC
Continuity of Care Form    Patient Name: Max Rubio   :  1955  MRN:  [id number]    Admit date:  2022  Discharge date:  ***    Code Status Order: Limited   Advance Directives:      Admitting Physician:  Olga Morgan MD  PCP: Huma Xie MD    Discharging Nurse: [facility] Unit/Room#: 4436/4802-S  Discharging Unit Phone Number: ***    Emergency Contact:   Extended Emergency Contact Information  Primary Emergency Contact: [address] Phone: [phone]  Relation: Other    Past Surgical History:  Past Surgical History:   Procedure Laterality Date    BREAST SURGERY      left benign cyst    BREAST SURGERY      CATARACT REMOVAL      COLONOSCOPY  3/1/2014    DILATION AND CURETTAGE OF UTERUS  2014    EYE SURGERY  2003 , 2014    HYSTEROSCOPY  2014    WISDOM TOOTH EXTRACTION         Immunization History:   Immunization History   Administered Date(s) Administered    Influenza Virus Vaccine 02/10/2015    Pneumococcal Polysaccharide ([id number]) 02/10/2015       Active Problems:  Patient Active Problem List   Diagnosis Code    Hypertension I10    Depression F32. A    Arthritis M19.90    H/O vaginal bleeding Z87.42    Dermatitis L30.9    Anxiety F41.9    Hyperlipidemia E78.5    Vitamin B12 deficiency E53.8    Vitamin D deficiency E55.9    Abnormal EKG R94.31    Enlarged heart I51.7    Postmenopausal bleeding N95.0    Obesity E66.9    Generalized weakness R53.1    Tibial fracture S82.209A    SIRS (systemic inflammatory response syndrome) ([facility]) R65.10    Polio A80.9    SHAWNA (acute kidney injury) ([facility] [address] N17.9    Sepsis due to urinary tract infection (HCC) A41.9, T86.5    Acute metabolic encephalopathy F40.35       Isolation/Infection:   Isolation            Contact          Patient Infection Status       None to display            Nurse Assessment:  Last Vital Signs: /63   Pulse 83   Temp 97.5 °F (36.4 °C) (Axillary)   Resp 19   Ht 5' 2\" (1.575 m)   Wt 209 lb 6.4 oz (95 kg)   SpO2 99%   BMI 38.30 kg/m²     Last documented pain score (0-10 scale): Pain Level: 1  Last Weight:   Wt Readings from Last 1 Encounters:   22 209 lb 6.4 oz (95 kg)     Mental Status:  {IP PT MENTAL STATUS:}    IV Access:  { HENNY IV ACCESS:839675467}    Nursing Mobility/ADLs:  Walking   {CHP DME LMRZ:688587980}  Transfer  {CHP DME JNJO:606863679}  Bathing  {CHP DME IEGN:587705341}  Dressing  {CHP DME YOZS:420606972}  Toileting  {CHP DME VYRI:619959445}  Feeding  {CHP DME ULP}  Med Admin  {CHP DME FTZD:550333934}  Med Delivery   { HENNY MED Delivery:397934073}    Wound Care Documentation and Therapy:  Wound 22 Buttocks Right (Active)   Dressing Status New dressing applied 22 0250   Wound Cleansed Cleansed with saline 22 0250   Dressing/Treatment Other (comment) 22 0250   Dressing Change Due 22 0250   Wound Length (cm) 1.2 cm 22 0000   Wound Width (cm) 1 cm 22 0000   Wound Depth (cm) 0.1 cm 22 0000   Wound Surface Area (cm^2) 1.2 cm^2 22 0000   Change in Wound Size % (l*w) -48.15 22 0000   Wound Volume (cm^3) 0.12 cm^3 22 0000   Wound Assessment Pink/red 22 1400   Drainage Amount Small 22 1400   Drainage Description Sanguinous 22 1400   Oneyda-wound Assessment Dry/flaky 22 1400   Number of days: 35       Wound 22 Abdomen Left (Active)   Dressing Status New dressing applied 22 0245   Wound Cleansed Cleansed with saline 22 0245   Dressing/Treatment Other (comment) 22 0245   Dressing Change Due 22 0245   Wound Length (cm) 1.4 cm 22 0000   Wound Width (cm) 0.4 cm 22 0000   Wound Depth (cm) 0.1 cm 22 0000   Wound Surface Area (cm^2) 0.56 cm^2 22 0000   Wound Volume (cm^3) 0.056 cm^3 22 0000   Wound Assessment Other (Comment) 22 1400   Drainage Amount Scant 22 1400   Drainage Description Yellow 22 1400 Odor None 22 1400   Oneyda-wound Assessment Fragile 22 1400   Number of days: 0        Elimination:  Continence: Bowel: {YES / LK:39894}  Bladder: {YES / MS:05530}  Urinary Catheter: {Urinary Catheter:414206675}   Colostomy/Ileostomy/Ileal Conduit: {YES / JH:89135}       Date of Last BM: ***    Intake/Output Summary (Last 24 hours) at 2022 1442  Last data filed at 2022 1400  Gross per 24 hour   Intake 3780.6 ml   Output 1409 ml   Net 2371.6 ml     I/O last 3 completed shifts:   In: 4600.6 [P.O.:1080; I.V.:.9; IV Piggyback:1496.7]  Out:  [Urine:]    Safety Concerns:     [employer] Safety Concerns:055491583}    Impairments/Disabilities:      [employer] Impairments/Disabilities:794288982}    Nutrition Therapy:  Current Nutrition Therapy:   [employer] Diet List:276203088}    Routes of Feeding: {CHP DME Other Feedings:916860304}  Liquids: {Slp liquid thickness:24277}  Daily Fluid Restriction: {CHP DME Yes amt example:004377977}  Last Modified Barium Swallow with Video (Video Swallowing Test): {Done Not Done DCDW:464912696}    Treatments at the Time of Hospital Discharge:   Respiratory Treatments: ***  Oxygen Therapy:  {Therapy; copd oxygen:09580}  Ventilator:    { CC Vent VRQH:965417240}    Rehab Therapies: {THERAPEUTIC INTERVENTION:7377186972}  Weight Bearing Status/Restrictions: [employer] Weight [employer]in}  Other Medical Equipment (for information only, NOT a DME order):  {EQUIPMENT:399903088}  Other Treatments: ***    Patient's personal belongings (please select all that are sent with patient):  {CHP DME Belongings:455212114}    RN SIGNATURE:  {Esignature:645550089}    CASE MANAGEMENT/SOCIAL WORK SECTION    Inpatient Status Date: ***    Readmission Risk Assessment Score:  Readmission Risk              Risk of Unplanned Readmission:  23           Discharging to Facility/ Agency   [facility]: [facility]  Address:  Phone:  Fax:    Dialysis Facility (if applicable) Name:  Address:  Dialysis Schedule:  Phone:  Fax:    / signature: Electronically signed by Mono Don RN on 5/2/2022 at 2:43 PM      PHYSICIAN SECTION    Prognosis: {Prognosis:8658687886}    Condition at Discharge: Abelardo Valladares Patient Condition:172805745}    Rehab Potential (if transferring to Rehab): {Prognosis:9495908748}    Recommended Labs or Other Treatments After Discharge: ***    Physician Certification: I certify the above information and transfer of Uche Wheeler  is necessary for the continuing treatment of the diagnosis listed and that she requires [facility] for greater 30 days.      Update Admission H&P: {CHP DME Changes in VVQTS:178549419}    PHYSICIAN SIGNATURE:  {Esignature:903748658}

## 2022-05-02 NOTE — PROGRESS NOTES
Department of Internal Medicine  Infectious Diseases  Progress  Note      C/C : E coli bacteremia, sepsis     All above noted  Pt was transferred to MICU due to hypotension and change in mental status   Awake and alert this AM   Afebrile       Current Facility-Administered Medications   Medication Dose Route Frequency Provider Last Rate Last Admin    acetaminophen (TYLENOL) tablet 650 mg  650 mg Oral Q6H PRN Milka Robertson MD        Or    acetaminophen (TYLENOL) suppository 650 mg  650 mg Rectal Q6H PRN Milka Robertson MD        sodium chloride flush 0.9 % injection 5-40 mL  5-40 mL IntraVENous 2 times per day Beverly Bates MD        sodium chloride flush 0.9 % injection 5-40 mL  5-40 mL IntraVENous PRN Beverly Bates MD        0.9 % sodium chloride infusion   IntraVENous PRN Beverly Bates MD        heparin flush 100 UNIT/ML injection 100 Units  1 mL IntraVENous 2 times per day Beverly Bates MD        heparin flush 100 UNIT/ML injection 100 Units  1 mL IntraCATHeter PRN Beverly Bates MD        white petrolatum ointment   Topical Daily Beverly Bates MD        miconazole (MICOTIN) 2 % powder   Topical BID Beverly Bates MD   Given at 05/02/22 4215    potassium phosphate 15 mmol in dextrose 5 % 250 mL IVPB  15 mmol IntraVENous Once Beverly Bates MD 62.5 mL/hr at 05/02/22 0825 15 mmol at 05/02/22 0825    dextrose 5 % and 0.45 % NaCl with KCl 20 mEq infusion   IntraVENous Continuous Seth Lees MD 75 mL/hr at 05/02/22 1011 New Bag at 05/02/22 1011    lidocaine PF 1 % injection 5 mL  5 mL IntraDERmal Once Jayme Luong MD        sodium chloride flush 0.9 % injection 5-40 mL  5-40 mL IntraVENous 2 times per day Bob Hutchins MD        sodium chloride flush 0.9 % injection 5-40 mL  5-40 mL IntraVENous PRN Jayme Luong MD        0.9 % sodium chloride infusion   IntraVENous PRN Jayme Luong MD        heparin flush 100 UNIT/ML injection 300 Units  3 mL IntraVENous 2 times per day Brandin Sunshine MD        heparin flush 100 UNIT/ML injection 300 Units  3 mL IntraCATHeter PRN Jayme Luong MD        ertapenem (INVanz) 1000 mg IVPB minibag  1,000 mg IntraVENous Q24H Jayme Luong MD   Stopped at 05/01/22 1148    pantoprazole (PROTONIX) injection 40 mg  40 mg IntraVENous Daily Kvng Drummond MD   40 mg at 05/02/22 0917    clopidogrel (PLAVIX) tablet 75 mg  75 mg Oral Daily Maximus Littlejohn MD   75 mg at 05/01/22 0838    [START ON 5/5/2022] vitamin D (ERGOCALCIFEROL) capsule 50,000 Units  50,000 Units Oral Once per day on Thu Agapito Flores MD        [Held by provider] ferrous sulfate (IRON 325) tablet 325 mg  325 mg Oral Every Other Day Agapito Flores MD   325 mg at 04/30/22 2126    fluticasone (FLONASE) 50 MCG/ACT nasal spray 2 spray  2 spray Each Nostril Daily Agapito Flores MD   2 spray at 05/01/22 0839    [Held by provider] furosemide (LASIX) tablet 40 mg  40 mg Oral Daily Agapito Flores MD   40 mg at 05/01/22 0837    [Held by provider] magnesium oxide (MAG-OX) tablet 400 mg  400 mg Oral Daily Agapito Flores MD   400 mg at 05/01/22 0838    [Held by provider] oxybutynin (DITROPAN-XL) extended release tablet 10 mg  10 mg Oral Daily Agapito Flores MD   10 mg at 05/01/22 0838    [Held by provider] potassium bicarb-citric acid (EFFER-K) effervescent tablet 20 mEq  20 mEq Oral Daily Agapito Flores MD   20 mEq at 05/01/22 0838    sertraline (ZOLOFT) tablet 50 mg  50 mg Oral Daily Maximus Littlejohn MD   50 mg at 05/01/22 0838    atorvastatin (LIPITOR) tablet 10 mg  10 mg Oral Daily Agapito Flores MD   10 mg at 05/01/22 0837    [Held by provider] trospium (SANCTURA) tablet 20 mg  20 mg Oral Nightly Agapito Flores MD   20 mg at 04/30/22 2126    enoxaparin (LOVENOX) injection 40 mg  40 mg SubCUTAneous Daily Agapito Flores MD   40 mg at 05/02/22 0917    polyethylene glycol (GLYCOLAX) packet 17 g  17 g Oral Daily PRN MD Tree Mendez ipratropium-albuterol (DUONEB) nebulizer solution 1 ampule  1 ampule Inhalation Q4H PRN Brenda Murillo MD              REVIEW OF SYSTEMS:    CONSTITUTIONAL: Denies fever or chills   HEENT: denies blurring of vision or double vision, denies hearing problem  RESPIRATORY: denies cough, shortness of breath, sputum expectoration  CARDIOVASCULAR:  Denies palpitation or chest pain   GASTROINTESTINAL:  Denies abdomen pain, diarrhea or constipation. GENITOURINARY:  Denies burning urination or frequency of urination  INTEGUMENT: denies wound , rash  HEMATOLOGIC/LYMPHATIC:  Denies lymph node swelling, gum bleeding or easy bruising. MUSCULOSKELETAL:  Denies leg pain , joint pain , joint swelling  NEUROLOGICAL:  Change in mental status     PHYSICAL EXAM:      Vitals:     Vitals:    05/02/22 1000   BP: (!) 84/51   Pulse: 99   Resp: 14   Temp: 98.2 °F (36.8 °C)   SpO2: 96%       General Appearance:    Awake, alert now , no acute distress. Head:    Normocephalic, atraumatic   Eyes:    No pallor, no icterus,   Ears:    No obvious deformity or drainage.    Nose:   No nasal drainage   Throat:   Mucosa moist, no oral thrush   Neck:   Supple, no lymphadenopathy   Back:     no CVA tenderness   Lungs:     Clear to auscultation bilaterally, no wheeze    Heart:    Regular rate and rhythm, no murmur   Abdomen:     Soft, non-tender, bowel sounds present    Extremities:   No edema, no cyanosis    Pulses:   Dorsalis pedis palpable    Skin:   no rashes       CBC with Differential:      Lab Results   Component Value Date    WBC 20.5 05/02/2022    RBC 2.94 05/02/2022    HGB 8.1 05/02/2022    HCT 24.9 05/02/2022    PLT 70 05/02/2022    MCV 84.7 05/02/2022    MCH 27.6 05/02/2022    MCHC 32.5 05/02/2022    RDW 16.3 05/02/2022    METASPCT 0.9 05/01/2022    LYMPHOPCT 6.1 05/02/2022    MONOPCT 1.6 05/02/2022    MYELOPCT 0.9 05/01/2022    BASOPCT 0.1 05/02/2022    MONOSABS 0.00 05/02/2022    LYMPHSABS 1.23 05/02/2022    EOSABS 0.00 05/02/2022 BASOSABS 0.00 05/02/2022       CMP     Lab Results   Component Value Date     05/02/2022     05/02/2022    K 3.9 05/02/2022    K 4.1 05/02/2022     05/02/2022     05/02/2022    CO2 23 05/02/2022    CO2 23 05/02/2022    BUN 26 05/02/2022    BUN 26 05/02/2022    CREATININE 1.0 05/02/2022    CREATININE 1.0 05/02/2022    GFRAA >60 05/02/2022    GFRAA >60 05/02/2022    LABGLOM 55 05/02/2022    LABGLOM 55 05/02/2022    GLUCOSE 198 05/02/2022    GLUCOSE 197 05/02/2022    PROT 5.1 05/02/2022    LABALBU 2.0 05/02/2022    CALCIUM 7.7 05/02/2022    CALCIUM 7.6 05/02/2022    BILITOT 0.3 05/02/2022    ALKPHOS 109 05/02/2022    AST 6 05/02/2022    ALT 5 05/02/2022         Hepatic Function Panel:    Lab Results   Component Value Date    ALKPHOS 109 05/02/2022    ALT 5 05/02/2022    AST 6 05/02/2022    PROT 5.1 05/02/2022    BILITOT 0.3 05/02/2022    LABALBU 2.0 05/02/2022       PT/INR:  No results found for: PROTIME, INR    TSH:    Lab Results   Component Value Date    TSH 2.860 05/02/2022       U/A:    Lab Results   Component Value Date    COLORU OTHER 04/30/2022    PHUR 8.5 04/30/2022    LABCAST FEW 02/07/2015    WBCUA PACKED 04/30/2022    RBCUA PACKED 04/30/2022    BACTERIA MANY 04/30/2022    CLARITYU TURBID 04/30/2022    SPECGRAV 1.010 04/30/2022    LEUKOCYTESUR LARGE 04/30/2022    UROBILINOGEN 1.0 04/30/2022    BILIRUBINUR LARGE 04/30/2022    BLOODU LARGE 04/30/2022    GLUCOSEU Negative 04/30/2022    AMORPHOUS PRESENT 03/27/2022       ABG:  No results found for: FKS4RWV, BEART, S6WBMSXB, PHART, THGBART, QTK7NLL, PO2ART, PYK9WSU    MICROBIOLOGY:    Blood culture -    Enterobacteriaceae by PCR DETECTED Panic       Enterobacter cloacae complex by PCR Not Detected    Enterococcus faecalis by PCR Not Detected    Enterococcus faecium by PCR Not Detected    Escherichia coli by PCR DETECTED Panic      Haemophilus Influenzae by PCR Not Detected    Klebsiella aerogenes by PCR Not Detected    Klebsiella oxytoca by PCR Not Detected    Klebsiella pneumoniae group by PCR Not Detected    Listeria monocytogenes by PCR Not Detected    Neisseria meningitidis by PCR Not Detected    Proteus species by PCR Not Detected    Pseudomonas aeruginosa by PCR Not Detected    Salmonella species by PCR Not Detected    Streptococcus agalactiae by PCR Not Detected    Staphylococcus aureus by PCR Not Detected    Staphylococcus epidermidis by PCR Not Detected    Staphylococcus lugdunensis by PCR Not Detected    Staphylococcus species by PCR Not Detected    Serratia marcescens by PCR Not Detected    Streptococcus pneumoniae by PCR Not Detected    Streptococcus pyogenes  by PCR Not Detected    Streptococcus species by PCR Not Detected    Stenotrophomonas maltophilia by PCR Not Detected    Candida albicans by PCR Not Detected    Candida auris by PCR Not Detected    Candida glabrata by PCR Not Detected    Candida krusei by PCR Not Detected    Candida parapsilosis by PCR Not Detected    Candida tropicalis by PCR Not Detected    Cryptococcus neoformans/gattii by PCR Not Detected    Cephalosporin Resistance CTX-M gene by PCR DETECTED Panic      Carbapenem Resistance IMP gene by PCR Not Detected    Carbapenem Resistance KPC by PCR Not Detected    Colistin Resistance mcr-1 gene by PCR Not Detected    Carbapenem Resistance NDM gene by PCR Not Detected    Carbapenem Resistance OXA-48 gene by PCR Not Detected    Carbapenem Resistance VIM gene Detected Not Detected           Urine Culture -    10 to 100,000 CFU/mL   Mixed katie isolated. Further workup and sensitivity testing   is not routinely indicated and will not be performed. Mixed katie isolated includes:   Mixed gram negative rods   Proteus species   Nonhemolytic Strep species    Narrative:           Radiology :    Chest X ray : no infiltrates       IMPRESSION:     1 ESBL ( CTX M ) E coli bacteremia, sepsis   2. CAUTI ( Cobos catheter was changed )   3.  Leukocytosis       RECOMMENDATIONS:      1. IV invanz 1 gram q 24 hrs  2. CBC with diff   3.  Contact isolation

## 2022-05-02 NOTE — PLAN OF CARE
Problem: Discharge Planning  Goal: Discharge to home or other facility with appropriate resources  Outcome: Not Progressing     Problem: Skin/Tissue Integrity  Goal: Absence of new skin breakdown  Outcome: Not Progressing     Problem: Chronic Conditions and Co-morbidities  Goal: Patient's chronic conditions and co-morbidity symptoms are monitored and maintained or improved  Outcome: Not Progressing     Problem: Pain  Goal: Verbalizes/displays adequate comfort level or baseline comfort level  Outcome: Not Progressing     Problem: Neurosensory - Adult  Goal: Achieves stable or improved neurological status  Outcome: Not Progressing  Goal: Achieves maximal functionality and self care  Outcome: Not Progressing     Problem: Cardiovascular - Adult  Goal: Maintains optimal cardiac output and hemodynamic stability  Outcome: Not Progressing     Problem: Safety - Adult  Goal: Free from fall injury  Outcome: Progressing     Problem: ABCDS Injury Assessment  Goal: Absence of physical injury  Outcome: Progressing     Problem: Cardiovascular - Adult  Goal: Absence of cardiac dysrhythmias or at baseline  Outcome: Progressing

## 2022-05-02 NOTE — PLAN OF CARE
Called number listed in the chart for : Talon Ogle - unable to reach and unable to leave voicemail. Attempted to call the listed mobile number [phone] - person who answered stated that they had recently gotten the number and did not think any of her relatives had this number at all. Will attempt to contact patient's family member again in the morning.      Dar Ann MD  05/02/22  PGY-3  Internal Medicine

## 2022-05-02 NOTE — CONSULTS
Medical Intensive Care Unit     Renaldo Ford6  Resident Consult Note    Date and time: 5/2/2022 5:40 AM  Patient's name:  Fuentes Rader  Medical Record Number: [id number]  Patient's account/billing number: [de-identified]  Patient's YOB: 1955  Age: 79 y.o. Date of Admission: 4/30/2022 12:57 PM  Length of stay during current admission: 2    Primary Care Physician: Dary Richter MD  ICU Attending Physician: Dr. Lisa Marc Status: Limited    Reason for ICU admission: Hypotension. History of Present Illness:   Fuentes Rader is a 59-year-old female on hospital day 2 with a past medical history of hypertension, chronic kidney disease, hyperlipidemia, CHF, TIA, anemia, abdominal abscess/infection and depression presenting to the ED on 4/30/2022 for concerns of altered mental status from her nursing home. Per chart review patient was found to be altered and confused at her nursing home as such she was brought to the ED. In the ED she was found to have a Cobos catheter along with a urine sample that was positive for leukocyte Estrace, nitrates and multiple bacteria. Cultures were drawn that was suspected that patient had developed a UTI. Patient was then sent to the floors for general admission. Patient was being followed by infectious disease while in the floor, cultures have grown E. coli and there was concern for ESBL per ID she was started on ertapenem. On 5/1 patient began to develop hypotension, primary initially gave patient C fluid bolus however this did not correct her blood pressure. RRT was called for hypotension. Patient was given additional fluid boluses and the decision was made to bring her to ICU to more closely monitor blood pressure. In the ICU a NICOM was obtained showing a 19% change as such she was fluid responsive. Patient has received a total of 1.5 L bolus equaling a 30 cc/kg bolus. On the unit patient's blood pressure stabilized and following closely. We will follow ID recommendations at this time, continue antibiotics and monitoring infectious work-up. On admission to ICU patient was also found to have drop in hemoglobin, there is presence of chronic anemia in this patient as she is supposed to be on iron supplements. Peripheral smear LDH and reticulocyte count were ordered, iron panel was also ordered we will follow. Continue ICU monitoring. Patient was previously admitted and March for UTI, it appears as if she was discharged from the hospital with a Sharma, his Sharma seems to remain in place until this admission, this would present a very likely source of infection in this patient. In December 2021 patient was hospitalized for an intra-abdominal infection that required care at [facility] in [place], most recent CT scan of the abdomen reveals resolution of abscesses. Review of systems limited due to patient being unable to answer questions this time when examined.        CURRENT VENTILATION STATUS:   [] Ventilator  [] BIPAP  [] Nasal Cannula [x] Room Air      SECRETIONS   Amount:  [] Small [] Moderate  [] Large [x] None    Color:     [] White [] Colored  [] Bloody    SEDATION:  RAAS Score:  [] Propofol gtt  [] Versed gtt  [] Fentanyl gtt  [x] No Sedation    PARALYZED:  [x] No    [] Yes    VASOPRESSORS:  [x] No    [] Yes    If yes -   [] Levophed       [] Dopamine     [] Vasopressin       [] Dobutamine  [] Phenylephrine         [] Epinephrine    CENTRAL LINES:     [x] No   [] Yes   (Date of Insertion:   )           If yes -     [] Right IJ     [] Left IJ [] Right Femoral [] Left Femoral                   [] Right Subclavian [] Left Subclavian     SHARMA'S CATHETER:   [] No   [x] Yes  (Date of Insertion:  5/1/22 )     URINE OUTPUT:            [x] Good   [] Low              [] Anuric    Review of Systems    OBJECTIVE:     VITAL SIGNS:  BP (!) 161/144   Pulse 111   Temp 98.4 °F (36.9 °C) (Axillary)   Resp 19   Ht 5' 2\" (1.575 m)   Wt 209 lb 6.4 oz (95 kg)   SpO2 96%   BMI 38.30 kg/m²   Tmax over 24 hours:  Temp (24hrs), Av.6 °F (37 °C), Min:97.8 °F (36.6 °C), Max:99.1 °F (37.3 °C)      Patient Vitals for the past 6 hrs:   BP Temp Temp src Pulse Resp SpO2 Weight   22 0500 (!) 161/144 -- -- 111 19 96 % --   22 0400 (!) 98/56 98.4 °F (36.9 °C) Axillary 114 20 97 % --   22 0300 104/64 -- -- 99 18 97 % --   22 0230 115/65 98.4 °F (36.9 °C) -- 98 18 98 % --   22 0215 109/60 -- -- 105 18 97 % --   22 0200 (!) 104/58 -- -- 100 17 97 % --   22 0145 (!) 96/56 -- -- 100 18 98 % --   22 0130 (!) 96/57 -- -- 98 18 97 % --   22 0115 (!) 99/56 -- -- 102 21 98 % --   22 0100 95/62 -- -- 97 20 98 % --   22 0045 113/61 -- -- 104 18 95 % --   22 0030 100/60 -- -- 127 23 97 % --   22 0000 87/61 98.8 °F (37.1 °C) Axillary 110 25 97 % --   22 2349 -- -- -- -- -- -- 209 lb 6.4 oz (95 kg)   22 234 95/73 98.8 °F (37.1 °C) Axillary 106 18 98 % --         Intake/Output Summary (Last 24 hours) at 2022 0540  Last data filed at 2022 0500  Gross per 24 hour   Intake 2773 ml   Output 1795 ml   Net 978 ml     Wt Readings from Last 2 Encounters:   22 209 lb 6.4 oz (95 kg)   22 218 lb 8 oz (99.1 kg)     Body mass index is 38.3 kg/m². Physical Exam  Constitutional:       Appearance: Normal appearance. Eyes:      Pupils: Pupils are equal, round, and reactive to light. Cardiovascular:      Rate and Rhythm: Normal rate and regular rhythm. Pulses: Normal pulses. Heart sounds: Normal heart sounds. No murmur heard. Pulmonary:      Effort: Pulmonary effort is normal.      Breath sounds: Normal breath sounds. No wheezing or rhonchi. Abdominal:      General: Abdomen is flat. There is no distension. Palpations: Abdomen is soft. Tenderness: There is no abdominal tenderness. Musculoskeletal:         General: Normal range of motion.       Cervical back: Normal range of motion. Skin:     General: Skin is warm and dry. Capillary Refill: Capillary refill takes less than 2 seconds. Neurological:      Comments: Eyes: Limited mobility on her left side due to previous TIAs. Patient is lethargic and slow to answer most questions, she is ANO x2 when examined.    Psychiatric:         Mood and Affect: Mood normal.         Behavior: Behavior normal.          Any additional physical findings:    MEDICATIONS:  Scheduled Meds:   potassium chloride  10 mEq IntraVENous Q1H    lidocaine  5 mL IntraDERmal Once    sodium chloride flush  5-40 mL IntraVENous 2 times per day    heparin flush  1 mL IntraVENous 2 times per day    white petrolatum   Topical Daily    miconazole   Topical BID    potassium phosphate IVPB  15 mmol IntraVENous Once    magnesium sulfate  1,000 mg IntraVENous Once    ertapenem (INVanz) IVPB  1,000 mg IntraVENous Q24H    pantoprazole  40 mg IntraVENous Daily    clopidogrel  75 mg Oral Daily    [START ON 5/5/2022] vitamin D  50,000 Units Oral Once per day on Thu    [Held by provider] ferrous sulfate  325 mg Oral Every Other Day    fluticasone  2 spray Each Nostril Daily    [Held by provider] furosemide  40 mg Oral Daily    [Held by provider] magnesium oxide  400 mg Oral Daily    [Held by provider] oxybutynin  10 mg Oral Daily    [Held by provider] potassium bicarb-citric acid  20 mEq Oral Daily    sertraline  50 mg Oral Daily    atorvastatin  10 mg Oral Daily    [Held by provider] trospium  20 mg Oral Nightly    enoxaparin  40 mg SubCUTAneous Daily     Continuous Infusions:   sodium chloride       PRN Meds:   acetaminophen, 650 mg, Q6H PRN   Or  acetaminophen, 650 mg, Q6H PRN  sodium chloride flush, 5-40 mL, PRN  sodium chloride, , PRN  heparin flush, 1 mL, PRN  ondansetron, 4 mg, Q8H PRN   Or  ondansetron, 4 mg, Q6H PRN  polyethylene glycol, 17 g, Daily PRN  ipratropium-albuterol, 1 ampule, Q4H PRN        VENT SETTINGS (Comprehensive) (if applicable): Additional Respiratory Assessments  Pulse: 111  Resp: 19  SpO2: 96 %    ABGs:   Recent Labs     05/01/22  2345   PH 7.531*   PCO2 28.5*   PO2 170.1*   HCO3 23.3   BE 1.1   O2SAT 99.0*       Laboratory findings:  Complete Blood Count:   Recent Labs     04/30/22  1327 05/01/22  0744 05/01/22  2320   WBC 19.5* 32.1* 26.3*   HGB 10.6* 11.3* 8.4*   HCT 33.2* 35.9 25.7*   * 87* 76*        Last 3 Blood Glucose:   Recent Labs     04/30/22 1338 05/01/22  0744 05/01/22  2320   GLUCOSE 113 155* 198*        PT/INR:  No results found for: PROTIME, INR  PTT:  No results found for: APTT, PTT    Comprehensive Metabolic Profile:   Recent Labs     04/30/22  1327 04/30/22  1327 04/30/22 1338 05/01/22  0744 05/01/22  2320     --   --  144 141   K 3.3*  --   --  3.7 2.9*     --   --  104 108*   CO2 25  --   --  20* 23   BUN 23  --   --  25* 27*   CREATININE 0.8  --   --  0.8 1.0   GLUCOSE 136*   < > 113 155* 198*   CALCIUM 8.4*  --   --  8.7 7.7*   PROT 6.2*   < >  --  6.7  --    LABALBU 2.7*   < >  --  2.7*  --    BILITOT 0.5   < >  --  0.4  --    ALKPHOS 121*   < >  --  127*  --    AST 12   < >  --  10  --    ALT 8   < >  --  9  --     < > = values in this interval not displayed. Magnesium:   Lab Results   Component Value Date    MG 1.8 05/01/2022     Phosphorus:   Lab Results   Component Value Date    PHOS 1.7 05/01/2022     Ionized Calcium: No results found for: CAION   Troponin: No results for input(s): TROPONINI in the last 72 hours. Radiology/Imaging:   XR CHEST (2 VW)   Final Result   Cardiomegaly without signs of congestion         CT Head WO Contrast   Final Result   No acute intracranial abnormality. There is age-appropriate atrophy and   small-vessel ischemic disease. CT chest.      There is borderline cardiomegaly. There is mediastinal lipomatosis. There   is mild ectasia of the ascending thoracic aorta measuring 3.7 x 3.5 cm.   The   trachea and major bronchi are patent. Nonenlarged mediastinal lymph nodes   are present. There is mild peribronchial thickening concerning for   bronchitis with minimal atelectasis in the lung bases. There is no focal   consolidation or pleural effusion. Liver is of normal architecture. Bilateral adrenal nodules are present with larger 1 on the right side   measuring 2.3 x 3.9 cm degenerative changes are identified in the thoracic   spine. Impression      Mild cardiomegaly with bronchitis and atelectasis in the lung bases. There   is no focal consolidation or edema. Bilateral adrenal nodules. RECOMMENDATIONS:   Unavailable         CT CHEST WO CONTRAST   Final Result   No acute intracranial abnormality. There is age-appropriate atrophy and   small-vessel ischemic disease. CT chest.      There is borderline cardiomegaly. There is mediastinal lipomatosis. There   is mild ectasia of the ascending thoracic aorta measuring 3.7 x 3.5 cm. The   trachea and major bronchi are patent. Nonenlarged mediastinal lymph nodes   are present. There is mild peribronchial thickening concerning for   bronchitis with minimal atelectasis in the lung bases. There is no focal   consolidation or pleural effusion. Liver is of normal architecture. Bilateral adrenal nodules are present with larger 1 on the right side   measuring 2.3 x 3.9 cm degenerative changes are identified in the thoracic   spine. Impression      Mild cardiomegaly with bronchitis and atelectasis in the lung bases. There   is no focal consolidation or edema. Bilateral adrenal nodules. RECOMMENDATIONS:   Unavailable         XR CHEST PORTABLE   Final Result   Rotational artifact with either artifactual prominence of the right hilum or   a right hilar mass. Recommend follow-up with two view chest radiograph or   chest CT to exclude a neoplastic process. ASSESSMENT  And PLAN:      Assessment:  1.  Hypotension 2/2 hypovolemia due to poor oral intake: RRT called for hypotension, was fluid Responsive, NICOM was responsive at 19%  2. Sepsis 2/2 E. Coli bacteremia: ID following   3. UTI: Urine culture positive for E. Coli  4. Acute Metabolic Encephalopathy 2/2 sepsis/bacteremia: CT head negative, is growing GNR in Blood, UA positive for bacteria   5. Normocytic Anemia: possibly chronic, on iron supplements at home  6. Thrombocytopenia  7. Elevated Troponin likely 2/2 sepsis, ekg showed no acute changes  8. Hypokalemia 2/2 Diuretic use? (on home lasix) and poor oral intake  9. Hx CHF: Last echo was in 2015 showing EF 50%, was on home lasix, pro-BNP elevated over 3000   10. Hx Hyperlipidemia: On Atorvastatin 10 mg   11. Hx CKD baseline Cr 1-1.2  12. Hx Depression  13. Hx Endometrial Cancer  14. Hx TIA  Resolved Problems: Lactic Acidosis    Plan:  · Had 1 Dose of Midodrine  · NICOM showed fluid responsive (+19%)   · Follow BP, may require additional fluids or the use of midodrine  · Would recommend echo to assess cardiac function  · Holding Lasix  · K+ replacement as appropriate   · Trend Torponin  · Follow Iron Studies  · Follow Peripheral smear and reticulocyte count   · Per ID on Invanz, follow recommendations        WEAN PER PROTOCOL:  [] No   [] Yes  [x] N/A    ICU PROPHYLAXIS:  Stress ulcer:  [x] PPI Agent  [] S8Omrak [] Sucralfate  [] Other:  VTE:   [x] Enoxaparin  [] Unfract. Heparin Subcut  [] EPC Cuffs    NUTRITION:  [] NPO [] Tube Feeding (Specify: ) [] TPN  [] PO (Diet: Diet NPO)    INSULIN DRIP:   [x] No   [] Yes    CONSULTATION NEEDED:   [x] No   [] Yes    FAMILY UPDATED:    [x] No   [] Yes    TRANSFER OUT OF ICU:   [x] No   [] Yes     Joie Rosenberg MD, PGY-1  Attending Physician: Dr. Rachel Mejia  Discussed case with Dr. Marcos Chan  5/2/2022, 5:40 AM    I personally saw, examined and provided care for the patient. Radiographs, labs and medication list were reviewed by me independently.  Review of Residents documentation was conducted and revisions were made as appropriate. I agree with the above documented exam, problem list and plan of care.         CCT excluding procedures 33 minutes    Inell Hume, DO

## 2022-05-02 NOTE — PROGRESS NOTES
[address], [address] [place] [address] Road                                                               Patient Name: Tamra Cassidy  MRN: [id number]  : 1955  Room: [address] [place], [place] [address]A    Evaluating OT: JENNIFER Porter,  OTR/L; [id number]    Referring Provider: Jonette Dance, MD   Specific Provider Orders/Date: OT eval and treat (22)       Diagnosis: Complicated UTI (urinary tract infection) [N39.0]  Nursing home resident [D89.3]  Acute metabolic encephalopathy [F78.19]     Reason for admission: Pt admitted with complicated UTI, AMS    Surgery/Procedures: None this admission. Pertinent Medical History:    Past Medical History:   Diagnosis Date    Acute kidney failure ([address]     Anemia     Anxiety     Cardiomyopathy ([facility] 3/24/2014    Chlamydia     Chronic back pain     Depression     Difficulty in walking     Enlarged heart     Essential hypertension     Genital warts     Headache(784.0)     Heart failure ([address]     Hernia 3/2014    Hyperlipidemia     Hypertension     Malignant neoplasm of endometrium (HCC)     Neuropathy     Obesity     Osteoarthritis     Polio     Post traumatic stress disorder     Postmenopausal vaginal bleeding     Urinary incontinence         *Precautions:  Fall Risk, NPO, chronic lizama    Assessment of current deficits   [x] Functional mobility  [x]ADLs  [x] Strength               [x]Cognition   [x] Functional transfers   [x] IADLs         [x] Safety Awareness   [x]Endurance   [] Fine Coordination        [x] ROM     [] Vision/perception   []Sensation    []Gross Motor Coordination [x] Balance   [] Delirium                  []Motor Control     [] Communication    OT PLAN OF CARE   OT POC based on physician orders, patient diagnosis and results of clinical assessment.        Frequency/Duration: 1-3 days/wk for 1-2 weeks PRN    Specific OT Treatment Interventions to include:   * Instruction/training on adapted ADL techniques and AE recommendations to increase functional independence within precautions       * Training on energy conservation strategies, correct breathing pattern and techniques to improve independence/tolerance for self-care routine  * Functional transfer/mobility training/DME recommendations for increased independence, safety, and fall prevention  * Patient/Family education to increase follow through with safety techniques and functional independence  * Recommendation of environmental modifications for increased safety with functional transfers/mobility and ADLs  * Cognitive retraining/development of therapeutic activities to improve problem solving, judgement, memory, and attention for increased safety/participation in ADL/IADL tasks  * Sensory re-education to improve body/limb awareness, maintain/improve skin integrity, and improve hand/UE motor function  * Visual-perceptual training to improve environmental scanning, visual attention/focus, and oculomotor skills for increased safety/independence with functional transfers/mobility and ADLs  * Splinting/positioning for increased function, prevention of contractures, and improve skin integrity  * Therapeutic exercise to improve motor endurance, ROM, and functional strength for ADLs/functional transfers  * Therapeutic activities to facilitate/challenge dynamic balance, stand tolerance for increased safety and independence with ADLs  * Therapeutic activities to facilitate gross/fine motor skills for increased independence with ADLs  * Neuro-muscular re-education: facilitation of righting/equilibrium reactions, midline orientation, scapular stability/mobility, normalization of muscle tone, and facilitation of volitional active controled movement  * Positioning to improve skin integrity, interaction with environment and functional independence    Recommended Adaptive Equipment: TBD     Home Living: Pt admitted from SNF. Bathroom setup: Handicap accessible  Equipment owned: None    Prior Level of Function: Pt unable to provide PLOF d/t cognition. Driving: Unknown  Occupation: Unknown    Pain Level: Pt reporting no pain this session. Cognition: A&O: 1/4  Oriented to person; Follows 1 step commands <15% of the time. Memory: P   Comprehension : P   Problem solving: P   Judgement/safety: P               Communication skills: Impaired; pt with limited verbalizations, says yes/no intermittently. Vision: Pt keeping eyes closed throughout session despite cues. Improved alertness with upright sitting. Appears WFL               Glasses: Unknown                                                   Hearing: Appears WFL     RASS: -1  CAM-ICU: (NT) Delirium; pt unable to follow commands for assessment. UE Assessment: Unknown  Hand Dominance: Right []  Left []     ROM Strength STM goal: PRN   RUE  PROM  WFL  AROM  Functionally assessed  Appears WFL 3/5      LUE  PROM  Shoulder flx: 0-80 degrees  Elbow, wrist, hand WFL  AROM   Functionally assessed appears WFL. 3/5        Sensation: Unable to assess d/t pt cognition. Tone: WNL   Edema: Unremarkable. Functional Assessment:  -PAC Daily Activity Raw Score: 6/24   Initial Eval Status  Date: 5/2/22 Treatment Status  Date:  STGs = LTGs  Time frame: 7-14 days   Feeding Dep                      Mod A  With adaptive equipment PRN. Grooming Dep                      Mod A        UB dressing/bathing Dep                      Mod A       LB dressing/bathing Dep                      Mod A   With adaptive equipment PRN. Toileting Dep                      Mod A     Bed Mobility  Supine to sit:   Dep x2    Sit to supine:   Dep x2                      Mod A     Functional Transfers Sit to stand:   NT    Stand to sit:   NT    Stand Pivot:   NT                      Mod A     Functional Mobility NT  Pt unable to tolerate at this time.                       Mod A Balance Sitting:     Static: Dep    Dynamic: NT  Standing: NT  Sitting:     Static:  Min A    Dynamic:Min A  Standing: Mod A                   Endurance/Activity Tolerance   poor tolerance with light activity. WFL   Visual/  Perceptual   Appears WFL                     Vitals:   HR at rest: 85 bpm HR at end of session: 85 bpm   Spo2 at rest: 100% Spo2 at end of session 99%   BP at rest: 109/52 mmHg BP at end of session 109/63 mmHg       Treatment: OT treatment provided this date includes:      Instruction/training on safety and adapted techniques for completion of ADLs: to increase independence in self-care [facility] assist to facilitate BUE use for ADLs. Lamberto Sylvester  Instruction/training on safe bed mobility/functional mobility/transfer techniques: with focus on safety, body mechanics, and orientation/alertness.   Proper Positioning/Alignment for optimal healing, skin integrity, and reduce contracture risk.  Skilled Monitoring of Vitals: to include BP, spO2, and HR throughout session to maximize safety.  Therapeutic activity: to challenge dynamic sitting/standing balance and endurance to promote safety during ADL tasks and functional transfers and mobility.  Delirium Prevention: Environmental and sensory modifications assessed and implemented to decrease ICU acquired delirium and to improve overall orientation, mentation and pt interaction with family/staff. Line management and environmental modifications made prior to and end of session to ensure patient safety and to increase efficiency of session. Skilled monitoring of HR, O2 saturation, blood pressure and patient's response to activity performed throughout session. Comments: Pt case discussed in rounds, OK from RN to see patient. Upon arrival, patient supine in bed. Pt demo poor tolerance with poor understanding of education/techniques. At end of session, patient supine in bed. Call light within reach, all lines and tubes intact. Pt instructed on use of call light for assistance and fall prevention. Nursing notified of patient positioning. Patient presents with decreased ROM/strength, activity tolerance, dynamic balance, functional mobility limiting completion of ADLs and safety. Pt can benefit from continued skilled OT services to increase safety, functional independence and quality of life. Rehab Potential: Good for established goals    Patient / Family Goal: to return to PLOF    Patient and/or family were instructed/educated on diagnosis, prognosis/goals and plan of care. Patient demonstrated poor understanding. Evaluation Complexity: Moderate     · History: Expanded chart review of consults, imaging, and psychosocial history related to current functional performance. · Exam: 5+ performance deficits identified limiting functional independence and safe return home   · Assistance/Modification: Min/mod assistance or modifications required to perform tasks. May have comorbidities that affect occupational performance. [] Malnutrition indicators have been identified and nursing has been notified to ensure a dietitian consult is ordered. Time In: 1:37p             Time Out: 2:00p         Total Treatment time: 8 minutes   Min Units   OT Eval Low 28175     OT Eval Medium 97166 X 1   OT Eval High 07405     OT Re-Eval [id number]     Therapeutic Ex 59695     Therapeutic Activities 98332 8 1   ADL/Self Care 24065     Orthotic Management 96840     Neuro Re-Ed 97908     Non-Billable Time        Evaluation time includes thorough review of current medical information, gathering information on past medical history/social history and prior level of function, completion of standardized testing/informal observation of tasks, assessment of data and development of POC/Goals.      Haylee Osman, JENNIFER,  OTR/L; [id number]

## 2022-05-02 NOTE — PROGRESS NOTES
Vascular Access Procedure Note    Procedure Date:   5/2/2022    Pre-procedure Verification/Time-Out:  The proposed risks versus benefits of this procedure were discussed in detail by the physician with Dr. Gasca/ Dr. Zoey Menezes (NO FAMILY). written consent was obtained from Dr. Richi Mohan. Relevant documentation was reviewed prior to procedure including signed consent form and medications. All necessary equipment for procedure is available at time of procedure yes. An audible time out was done at 1100AM by team members, correctly identifying patients name, medical record number, correct side, correct site, and correct procedure to be performed with registered nurse members of the procedure team all in agreement.         Indication for Procedure:   Reason for Insertion: other multi meds    ASA Assessment (Required for Moderate & Deep Sedation):    Procedure:   Reason for Consultation: power PICC line    Clinician Performing Procedure:   Jerod Penn RN    Assistant:  none    Sedation:   Analgesia Used: lidocaine 1%    Procedure Details/Findings:  Catheter Mongolian Size: 5.5  Lot Number: [id number]  Product #: [id number]  Expiration Date: 03/31/2023  Maximum Barrier Precautions: cap, eye shield, full body drape, gloves, gown, handwashing and mask  Skin Prep: chlorhexidine  Technique: modified seldinger and ultrasound guided with VPS  Attempts: 1  Exposed (cm): 6  Total (cm): 41  Placement Site: right brachial vein  Vessel Size: 0.55  Dressing: securement device, transparent dressing and biopatch  Blood Return: Yes   Ultrasound Guidance: Yes   Arm Circumference Mid-Bicep (cm): 29  Chest X-Ray Ordered: VasoNova VPS  End Placement: caj    Complications:   none     Post-operative Condition:  stable  Patient Tolerated Procedure: well     Comments/Post-operative Education:   Post Procedure Interventions: no blood pressure sign placed above bed    Mere Vivas RN  05/02/22  11:40 AM

## 2022-05-03 NOTE — PLAN OF CARE
Problem: Discharge Planning  Goal: Discharge to home or other facility with appropriate resources  5/3/2022 0213 by Palak Slade  Outcome: Progressing     Problem: Skin/Tissue Integrity  Goal: Absence of new skin breakdown  Description: 1. Monitor for areas of redness and/or skin breakdown  2. Assess vascular access sites hourly  3. Every 4-6 hours minimum:  Change oxygen saturation probe site  4. Every 4-6 hours:  If on nasal continuous positive airway pressure, respiratory therapy assess nares and determine need for appliance change or resting period.   5/3/2022 0213 by Palak Slade  Outcome: Progressing     Problem: Safety - Adult  Goal: Free from fall injury  5/3/2022 0213 by Palak Slade  Outcome: Progressing     Problem: ABCDS Injury Assessment  Goal: Absence of physical injury  5/3/2022 0213 by Palak Slade  Outcome: Progressing     Problem: Chronic Conditions and Co-morbidities  Goal: Patient's chronic conditions and co-morbidity symptoms are monitored and maintained or improved  5/3/2022 0213 by Palak Slade  Outcome: Progressing     Problem: Pain  Goal: Verbalizes/displays adequate comfort level or baseline comfort level  5/3/2022 0213 by Palak Slade  Outcome: Progressing     Problem: Neurosensory - Adult  Goal: Achieves stable or improved neurological status  5/3/2022 0213 by Palak Slade  Outcome: Not Progressing     Problem: Neurosensory - Adult  Goal: Achieves maximal functionality and self care  5/3/2022 0213 by Palak Slade  Outcome: Not Progressing     Problem: Cardiovascular - Adult  Goal: Maintains optimal cardiac output and hemodynamic stability  5/3/2022 0213 by Palak Slade  Outcome: Progressing     Problem: Cardiovascular - Adult  Goal: Absence of cardiac dysrhythmias or at baseline  5/3/2022 0213 by Palak Slade  Outcome: Progressing     Problem: Skin/Tissue Integrity - Adult  Goal: Skin integrity remains intact  Outcome: Progressing     Problem: Skin/Tissue Integrity - Adult  Goal: Oral mucous membranes remain intact  Outcome: Progressing     Problem: Genitourinary - Adult  Goal: Urinary catheter remains patent  5/3/2022 0213 by Cornel Heart  Outcome: Progressing

## 2022-05-03 NOTE — PROGRESS NOTES
Department of Internal Medicine  Infectious Diseases  Progress  Note      C/C : E coli bacteremia, sepsis     Pt denies fever or chills  Denies pain abdomen or dysuria   Afebrile       Current Facility-Administered Medications   Medication Dose Route Frequency Provider Last Rate Last Admin    midodrine (PROAMATINE) tablet 5 mg  5 mg Oral TID  Allyson Ng MD        acetaminophen (TYLENOL) tablet 650 mg  650 mg Oral Q6H PRN Yanique Vences MD        Or    acetaminophen (TYLENOL) suppository 650 mg  650 mg Rectal Q6H PRN Yanique Vences MD        sodium chloride flush 0.9 % injection 5-40 mL  5-40 mL IntraVENous 2 times per day Valentina Yates MD        sodium chloride flush 0.9 % injection 5-40 mL  5-40 mL IntraVENous PRN Valentina Yates MD        0.9 % sodium chloride infusion   IntraVENous PRN Valentina Yates MD        heparin flush 100 UNIT/ML injection 100 Units  1 mL IntraVENous 2 times per day Valentina Yates MD   100 Units at 05/03/22 1042    heparin flush 100 UNIT/ML injection 100 Units  1 mL IntraCATHeter PRN Valentina Yates MD        white petrolatum ointment   Topical Daily Valentina Yates MD   Given at 05/03/22 1044    miconazole (MICOTIN) 2 % powder   Topical BID Kvng Taylor MD   Given at 05/03/22 1046    dextrose 5 % and 0.45 % NaCl with KCl 20 mEq infusion   IntraVENous Continuous Julián Zapien MD 75 mL/hr at 05/03/22 1154 New Bag at 05/03/22 1154    sodium chloride flush 0.9 % injection 5-40 mL  5-40 mL IntraVENous 2 times per day Burnis Dwight Luong MD   10 mL at 05/03/22 1039    sodium chloride flush 0.9 % injection 5-40 mL  5-40 mL IntraVENous PRN Jayme Luong MD        0.9 % sodium chloride infusion   IntraVENous PRN Jayme Luong MD        heparin flush 100 UNIT/ML injection 300 Units  3 mL IntraVENous 2 times per day Arie Cherry MD   300 Units at 05/02/22 2021    heparin flush 100 UNIT/ML injection 300 Units  3 mL IntraCATHeter PRN Chrissy Arambula MD        perflutren lipid microspheres (DEFINITY) injection 1.65 mg  1.5 mL IntraVENous ONCE PRN Fatmata Arora,         folic acid injection 1 mg  1 mg IntraVENous Daily Yan Schumacher MD   1 mg at 05/03/22 1040    pantoprazole (PROTONIX) injection 40 mg  40 mg IntraVENous BID Winnie Jurado MD   40 mg at 05/03/22 1039    ertapenem (INVanz) 1000 mg IVPB minibag  1,000 mg IntraVENous Q24H Chrissy Arambula MD   Stopped at 05/03/22 1230    [Held by provider] clopidogrel (PLAVIX) tablet 75 mg  75 mg Oral Daily Maximus Littlejohn MD   75 mg at 05/01/22 0838    [START ON 5/5/2022] vitamin D (ERGOCALCIFEROL) capsule 50,000 Units  50,000 Units Oral Once per day on Thu Rosendo Rocha MD        [Held by provider] ferrous sulfate (IRON 325) tablet 325 mg  325 mg Oral Every Other Day Rosendo Rocha MD   325 mg at 04/30/22 2126    fluticasone (FLONASE) 50 MCG/ACT nasal spray 2 spray  2 spray Each Nostril Daily Rosendo Rocha MD   2 spray at 05/03/22 1043    [Held by provider] furosemide (LASIX) tablet 40 mg  40 mg Oral Daily Rosendo Rocha MD   40 mg at 05/01/22 0837    [Held by provider] magnesium oxide (MAG-OX) tablet 400 mg  400 mg Oral Daily Rosendo Rocha MD   400 mg at 05/01/22 0838    [Held by provider] oxybutynin (DITROPAN-XL) extended release tablet 10 mg  10 mg Oral Daily Rosendo Rocha MD   10 mg at 05/01/22 0838    [Held by provider] potassium bicarb-citric acid (EFFER-K) effervescent tablet 20 mEq  20 mEq Oral Daily Rosendo Rocha MD   20 mEq at 05/01/22 0838    sertraline (ZOLOFT) tablet 50 mg  50 mg Oral Daily Maximus Littlejohn MD   50 mg at 05/01/22 0838    atorvastatin (LIPITOR) tablet 10 mg  10 mg Oral Daily Rosendo Rocha MD   10 mg at 05/01/22 0837    [Held by provider] trospium (SANCTURA) tablet 20 mg  20 mg Oral Nightly Rosendo Rocha MD   20 mg at 04/30/22 2126    [Held by provider] enoxaparin (LOVENOX) injection 40 mg  40 mg SubCUTAneous Daily Rosendo Rocha MD   40 mg at 05/02/22 9822    polyethylene glycol (GLYCOLAX) packet 17 g  17 g Oral Daily PRN Nanette Donovan MD        ipratropium-albuterol (DUONEB) nebulizer solution 1 ampule  1 ampule Inhalation Q4H PRN Nanette Donovan MD              REVIEW OF SYSTEMS:      CONSTITUTIONAL: Denies fever or chills   HEENT: denies blurring of vision or double vision, denies hearing problem  RESPIRATORY: denies cough, shortness of breath, sputum expectoration  CARDIOVASCULAR:  Denies palpitation or chest pain   GASTROINTESTINAL:  Denies abdomen pain, diarrhea or constipation. GENITOURINARY:  Denies burning urination or frequency of urination  INTEGUMENT: denies wound , rash  HEMATOLOGIC/LYMPHATIC:  Denies lymph node swelling, gum bleeding or easy bruising. MUSCULOSKELETAL:  Denies leg pain , joint pain , joint swelling  NEUROLOGICAL:  Awake and alert      PHYSICAL EXAM:      Vitals:     Vitals:    05/03/22 0815   BP: (!) 93/57   Pulse: 77   Resp: 18   Temp: 97.4 °F (36.3 °C)   SpO2: 99%       General Appearance:    Awake, alert  , no acute distress. Head:    Normocephalic, atraumatic   Eyes:    No pallor, no icterus,   Ears:    No obvious deformity or drainage.    Nose:   No nasal drainage   Throat:   Mucosa moist, no oral thrush   Neck:   Supple, no lymphadenopathy   Back:     no CVA tenderness   Lungs:     Clear to auscultation bilaterally, no wheeze    Heart:    Regular rate and rhythm, no murmur   Abdomen:     Soft, non-tender, bowel sounds present    Extremities:   No edema, no cyanosis    Pulses:   Dorsalis pedis palpable    Skin:   no rashes       CBC with Differential:      Lab Results   Component Value Date    WBC 9.5 05/03/2022    RBC 2.63 05/03/2022    HGB 8.0 05/03/2022    HCT 25.1 05/03/2022    PLT 47 05/03/2022    MCV 89.0 05/03/2022    MCH 27.4 05/03/2022    MCHC 30.8 05/03/2022    RDW 16.7 05/03/2022    NRBC 0.9 05/03/2022    METASPCT 0.9 05/01/2022    LYMPHOPCT 19.1 05/03/2022    MONOPCT 3.5 05/03/2022    MYELOPCT 0.9 05/01/2022 BASOPCT 0.3 05/03/2022    MONOSABS 0.38 05/03/2022    LYMPHSABS 1.81 05/03/2022    EOSABS 0.09 05/03/2022    BASOSABS 0.00 05/03/2022       CMP     Lab Results   Component Value Date     05/03/2022    K 3.6 05/03/2022    K 3.6 05/03/2022     05/03/2022    CO2 22 05/03/2022    BUN 20 05/03/2022    CREATININE 0.8 05/03/2022    GFRAA >60 05/03/2022    LABGLOM >60 05/03/2022    GLUCOSE 155 05/03/2022    PROT 4.6 05/03/2022    LABALBU 1.6 05/03/2022    CALCIUM 7.2 05/03/2022    BILITOT 0.3 05/03/2022    ALKPHOS 91 05/03/2022    AST 7 05/03/2022    ALT 5 05/03/2022         Hepatic Function Panel:    Lab Results   Component Value Date    ALKPHOS 91 05/03/2022    ALT 5 05/03/2022    AST 7 05/03/2022    PROT 4.6 05/03/2022    BILITOT 0.3 05/03/2022    LABALBU 1.6 05/03/2022       PT/INR:  No results found for: PROTIME, INR    TSH:    Lab Results   Component Value Date    TSH 2.860 05/02/2022       U/A:    Lab Results   Component Value Date    COLORU OTHER 04/30/2022    PHUR 8.5 04/30/2022    LABCAST FEW 02/07/2015    WBCUA PACKED 04/30/2022    RBCUA PACKED 04/30/2022    BACTERIA MANY 04/30/2022    CLARITYU TURBID 04/30/2022    SPECGRAV 1.010 04/30/2022    LEUKOCYTESUR LARGE 04/30/2022    UROBILINOGEN 1.0 04/30/2022    BILIRUBINUR LARGE 04/30/2022    BLOODU LARGE 04/30/2022    GLUCOSEU Negative 04/30/2022    AMORPHOUS PRESENT 03/27/2022       ABG:  No results found for: VUA0XSZ, BEART, H1BXNWXL, PHART, THGBART, TVZ9DTG, PO2ART, EWA1MUW    MICROBIOLOGY:    Blood culture -    Enterobacteriaceae by PCR DETECTED Panic       Enterobacter cloacae complex by PCR Not Detected    Enterococcus faecalis by PCR Not Detected    Enterococcus faecium by PCR Not Detected    Escherichia coli by PCR DETECTED Panic      Haemophilus Influenzae by PCR Not Detected    Klebsiella aerogenes by PCR Not Detected    Klebsiella oxytoca by PCR Not Detected    Klebsiella pneumoniae group by PCR Not Detected    Listeria monocytogenes by PCR Not Detected    Neisseria meningitidis by PCR Not Detected    Proteus species by PCR Not Detected    Pseudomonas aeruginosa by PCR Not Detected    Salmonella species by PCR Not Detected    Streptococcus agalactiae by PCR Not Detected    Staphylococcus aureus by PCR Not Detected    Staphylococcus epidermidis by PCR Not Detected    Staphylococcus lugdunensis by PCR Not Detected    Staphylococcus species by PCR Not Detected    Serratia marcescens by PCR Not Detected    Streptococcus pneumoniae by PCR Not Detected    Streptococcus pyogenes  by PCR Not Detected    Streptococcus species by PCR Not Detected    Stenotrophomonas maltophilia by PCR Not Detected    Candida albicans by PCR Not Detected    Candida auris by PCR Not Detected    Candida glabrata by PCR Not Detected    Candida krusei by PCR Not Detected    Candida parapsilosis by PCR Not Detected    Candida tropicalis by PCR Not Detected    Cryptococcus neoformans/gattii by PCR Not Detected    Cephalosporin Resistance CTX-M gene by PCR DETECTED Panic      Carbapenem Resistance IMP gene by PCR Not Detected    Carbapenem Resistance KPC by PCR Not Detected    Colistin Resistance mcr-1 gene by PCR Not Detected    Carbapenem Resistance NDM gene by PCR Not Detected    Carbapenem Resistance OXA-48 gene by PCR Not Detected    Carbapenem Resistance VIM gene Detected Not Detected           Urine Culture -    10 to 100,000 CFU/mL   Mixed katie isolated. Further workup and sensitivity testing   is not routinely indicated and will not be performed. Mixed katie isolated includes:   Mixed gram negative rods   Proteus species   Nonhemolytic Strep species    Narrative:           Radiology :    Chest X ray : no infiltrates       IMPRESSION:     1 ESBL ( CTX M ) E coli bacteremia, sepsis   2. CAUTI ( Cobos catheter was changed )   3. Leukocytosis - improved       RECOMMENDATIONS:      1. IV invanz 1 gram q 24 hrs ~ 10 days   2.  Contact isolation

## 2022-05-03 NOTE — PROGRESS NOTES
Nicom performed with 250cc bolus. SVI        CI      HR      MAP  Baseline       33          2.6    79        76    Challenge      36         2.9     82        72    %                   11.3%   10.9%   2.7%    -5.3%      Patient is fluid responsive.

## 2022-05-03 NOTE — PLAN OF CARE
Patient's chart updated to reflect:      . - HF care plan, HF education points and HF discharge instructions.  -Orders: 2 gram sodium diet, daily weights, I/O.  -PCP and/or Cardiologist appointment to be scheduled within 7 days of hospital discharge.  -History of HFCHF, not in acute exacerbation, continue home Lasix , not primary admission Dx.   Patient admitted for treatment of Henry Gooden RN RN, BSN  Heart Failure Navigator

## 2022-05-03 NOTE — PROGRESS NOTES
Hospitalist Progress Note      PCP: Marielena Poon MD    Date of Admission: 4/30/2022    Hospital Course:   \" 63-year-old nursing home resident with chronic lizama ,presents with altered mental status. EMS reports nursing home states she was unresponsive except to pain and was disoriented. Patient was found to have inner tract infection in the ED and she was started on Rocephin. complicated by hypotension requiring ICU stay. \"       Subjective:   Pt is mildly lethargic. She is answering questions. She is fatigued.     Medications:  Reviewed    Infusion Medications    sodium chloride      dextrose 5% and 0.45% NaCl with KCl 20 mEq 75 mL/hr at 05/03/22 1154    sodium chloride       Scheduled Medications    sodium chloride flush  5-40 mL IntraVENous 2 times per day    heparin flush  1 mL IntraVENous 2 times per day    white petrolatum   Topical Daily    miconazole   Topical BID    sodium chloride flush  5-40 mL IntraVENous 2 times per day    heparin flush  3 mL IntraVENous 2 times per day    folic acid  1 mg IntraVENous Daily    pantoprazole  40 mg IntraVENous BID    ertapenem (INVanz) IVPB  1,000 mg IntraVENous Q24H    [Held by provider] clopidogrel  75 mg Oral Daily    [START ON 5/5/2022] vitamin D  50,000 Units Oral Once per day on Thu    [Held by provider] ferrous sulfate  325 mg Oral Every Other Day    fluticasone  2 spray Each Nostril Daily    [Held by provider] furosemide  40 mg Oral Daily    [Held by provider] magnesium oxide  400 mg Oral Daily    [Held by provider] oxybutynin  10 mg Oral Daily    [Held by provider] potassium bicarb-citric acid  20 mEq Oral Daily    sertraline  50 mg Oral Daily    atorvastatin  10 mg Oral Daily    [Held by provider] trospium  20 mg Oral Nightly    [Held by provider] enoxaparin  40 mg SubCUTAneous Daily     PRN Meds: acetaminophen **OR** acetaminophen, sodium chloride flush, sodium chloride, heparin flush, sodium chloride flush, sodium chloride, heparin flush, perflutren lipid microspheres, polyethylene glycol, ipratropium-albuterol      Intake/Output Summary (Last 24 hours) at 5/3/2022 1237  Last data filed at 5/3/2022 1158  Gross per 24 hour   Intake 1750.87 ml   Output 580 ml   Net 1170.87 ml       Physical Exam Performed:    BP (!) 93/57   Pulse 77   Temp 97.4 °F (36.3 °C) (Temporal)   Resp 18   Ht 5' 2\" (1.575 m)   Wt 230 lb 11.2 oz (104.6 kg)   SpO2 99%   BMI 42.20 kg/m²     General appearance: No apparent distress, appears stated age. Lethargic. HEENT: Pupils equal, round, and reactive to light. Conjunctivae/corneas clear. Neck: Supple, with full range of motion. No jugular venous distention. Trachea midline. Respiratory:  Normal respiratory effort. Clear to auscultation, bilaterally without Rales/Wheezes/Rhonchi. Cardiovascular: Regular rate and rhythm with normal S1/S2 without murmurs, rubs or gallops. Abdomen: Soft, non-tender, non-distended with normal bowel sounds. Musculoskeletal: No clubbing, cyanosis or edema bilaterally. Full range of motion without deformity. Skin: Skin color, texture, turgor normal.  No rashes or lesions. Neurologic:  Moving ext  Psychiatric: Alert and oriented x 2      Labs:   Recent Labs     05/01/22 2320 05/01/22 2320 05/02/22  0900 05/02/22  1545 05/02/22  2141 05/03/22  0611 05/03/22  0813   WBC 26.3*  --  20.5*  --   --  9.5  --    HGB 8.4*   < > 8.1*   < > 7.3* 7.2* 8.0*   HCT 25.7*   < > 24.9*   < > 22.5* 23.4* 25.1*   PLT 76*  --  70*  --   --  47*  --     < > = values in this interval not displayed. Recent Labs     05/01/22  0744 05/01/22 2320 05/02/22  0900 05/03/22  0611   NA  --  141 141  142 144   K   < > 2.9* 4.1  3.9 3.6  3.6   CL  --  108* 109*  108* 111*   CO2  --  23 23  23 22   BUN  --  27* 26*  26* 20   CREATININE  --  1.0 1.0  1.0 0.8   CALCIUM  --  7.7* 7.6*  7.7* 7.2*   PHOS  --  1.7*  --  1.8*    < > = values in this interval not displayed.      Recent Labs     05/01/22  9060 05/02/22  0900 05/03/22  0611   AST 10 6 7   ALT 9 5 5   BILITOT 0.4 0.3 0.3   ALKPHOS 127* 109* 91     No results for input(s): INR in the last 72 hours. No results for input(s): Temitope Sybil in the last 72 hours. Urinalysis:      Lab Results   Component Value Date    NITRU POSITIVE 04/30/2022    WBCUA PACKED 04/30/2022    BACTERIA MANY 04/30/2022    RBCUA PACKED 04/30/2022    BLOODU LARGE 04/30/2022    SPECGRAV 1.010 04/30/2022    GLUCOSEU Negative 04/30/2022       Radiology:  XR CHEST (2 VW)   Final Result   Cardiomegaly without signs of congestion         CT Head WO Contrast   Final Result   No acute intracranial abnormality. There is age-appropriate atrophy and   small-vessel ischemic disease. CT chest.      There is borderline cardiomegaly. There is mediastinal lipomatosis. There   is mild ectasia of the ascending thoracic aorta measuring 3.7 x 3.5 cm. The   trachea and major bronchi are patent. Nonenlarged mediastinal lymph nodes   are present. There is mild peribronchial thickening concerning for   bronchitis with minimal atelectasis in the lung bases. There is no focal   consolidation or pleural effusion. Liver is of normal architecture. Bilateral adrenal nodules are present with larger 1 on the right side   measuring 2.3 x 3.9 cm degenerative changes are identified in the thoracic   spine. Impression      Mild cardiomegaly with bronchitis and atelectasis in the lung bases. There   is no focal consolidation or edema. Bilateral adrenal nodules. RECOMMENDATIONS:   Unavailable         CT CHEST WO CONTRAST   Final Result   No acute intracranial abnormality. There is age-appropriate atrophy and   small-vessel ischemic disease. CT chest.      There is borderline cardiomegaly. There is mediastinal lipomatosis. There   is mild ectasia of the ascending thoracic aorta measuring 3.7 x 3.5 cm. The   trachea and major bronchi are patent.   Nonenlarged mediastinal lymph nodes   are present. There is mild peribronchial thickening concerning for   bronchitis with minimal atelectasis in the lung bases. There is no focal   consolidation or pleural effusion. Liver is of normal architecture. Bilateral adrenal nodules are present with larger 1 on the right side   measuring 2.3 x 3.9 cm degenerative changes are identified in the thoracic   spine. Impression      Mild cardiomegaly with bronchitis and atelectasis in the lung bases. There   is no focal consolidation or edema. Bilateral adrenal nodules. RECOMMENDATIONS:   Unavailable         XR CHEST PORTABLE   Final Result   Rotational artifact with either artifactual prominence of the right hilum or   a right hilar mass. Recommend follow-up with two view chest radiograph or   chest CT to exclude a neoplastic process. Assessment/Plan:    Active Hospital Problems    Diagnosis     Acute metabolic encephalopathy [Y95.77]      Priority: Medium     Hypotension-due to hypovolemia/sepsis  -BP mildly low  -Critical care was consulted. Patient was given midodrine. Currently NPO thus not getting oral medications,SLP to assess. Recommend echocardiogram.Held lasix. Acute metabolic encephalopathy-likely secondary to urinary tract infection/bacteremia  -CT head non acute  -Continue antibiotic for bacteremia  -MRI if no improvement    ESBL E. coli bacteremia  -ID consulted. Continue ivanz.  -Wound culture shows mixed katie  -No pneumonia on CT chest    Hypokalemia  -Monitor and replace     Chronic anemia  -Hgb stable  -Hold Plavix, Lovenox    Thrombocytopenia-likely due to bacteremia  -Platelets trending down. Transfuse as needed. Hold Lovenox.     Other medical issues.   CHF  HLD  History of chronic back pain and impaired mobility  Hx of polio  Hx of endometrial ca  Hx of uti in march 2022  Hx of intara abdomin infection  Dec 2021    DVT Prophylaxis: SCDs  Diet: Diet NPO  Code Status: Limited    PT/OT Eval Status: as needed    [facility], [place]

## 2022-05-03 NOTE — PROGRESS NOTES
SPEECH/LANGUAGE PATHOLOGY  CLINICAL ASSESSMENT OF SWALLOWING FUNCTION   and PLAN OF CARE    PATIENT NAME:  Elis Shah  (female)     MRN:  [id number]    :  1955  (79 y.o.)  STATUS:  Inpatient: Room 8512/8512-A    TODAY'S DATE:  5/3/2022  REFERRING PROVIDER: Rubina Patel MD    SPECIFIC PROVIDER ORDER: SLP swallowing-dysphagia evaluation and treatment   Date of order:  22  REASON FOR REFERRAL: To assess oropharyngeal swallow fx. EVALUATING THERAPIST: VERÓNICA Burton                 RESULTS:    DYSPHAGIA DIAGNOSIS:   Clinical indicators of mild-moderate oral phase dysphagia  and suspected pharyngeal phase dysphagia       DIET RECOMMENDATIONS:  NPO until MBSS can be completed       FEEDING RECOMMENDATIONS:     Assistance level:  Not applicable      Compensatory strategies recommended: Not applicable      Discussed recommendations with nursing and/or faxed report to referring provider: Yes; informed pt's nurse re: rec for NPO at this time, for MBSS to be completed, and of need for consistent oral care for pt at this time. SPEECH THERAPY  PLAN OF CARE   The dysphagia POC is established based on physician order, dysphagia diagnosis and results of clinical assessment     Skilled SLP intervention for dysphagia management on acute care 3-5 x per week until goals met, pt plateaus in function and/or discharged from hospital    Conditions Requiring Skilled Therapeutic Intervention for dysphagia:    Patient is performing below functional baseline d/t  current acute condition, Multiple diagnoses, multiple medications, and increased dependency upon caregivers.   Wet vocal quality during PO intake  Coughing during PO intake      Specific dysphagia interventions to include:     MBSS to be completed    Specific instructions for next treatment:  MBSS to be completed  Patient Treatment Goals:    Short Term Goals:  Pt will participate in MBSS to fully assess oropharyngeal swallow function and to assist in determining the least restrictive PO diet to maintain adequate nutrition/hydration     Long Term Goals:   Pt will maintain adequate nutrition/hydration via PO intake of the least restrictive oral diet with implementation of safe swallow/ compensatory strategies and decrease signs/symptoms of aspiration to less than 1 x/day. Patient/family Goal:    Did not state. Will further assess during treatment. Plan of care discussed with Patient   The Patient did not demonstrate complete understanding of the diagnosis, prognosis and plan of care     Rehabilitation Potential/Prognosis: fair                    ADMITTING DIAGNOSIS: Complicated UTI (urinary tract infection) [N39.0]  Nursing home resident [G09.5]  Acute metabolic encephalopathy [X59.33]    VISIT DIAGNOSIS:   Visit Diagnoses       Codes    Complicated UTI (urinary tract infection)     N39.0    Nursing home resident     Z59.3           PATIENT REPORT/COMPLAINT: patient not able to accurately report  RN cleared patient for participation in assessment     Yes; pt's nurse reported that pt has coughed with thin liquid intake. PRIOR LEVEL OF SWALLOW FUNCTION:    PAST HISTORY OF DYSPHAGIA?: none reported    Home diet: Diet information not available     Current Diet Order:  Diet NPO    PROCEDURE:  Consistencies Administered During the Evaluation   Liquids: thin liquid   Solids:  pureed foods      Method of Intake:   cup, spoon  Fed by clinician      Position:   Seated, upright    CLINICAL ASSESSMENT:  Oral Stage:       Delayed A-P transit due to: cognitive function   Oral residuals were noted :  throughout the oral cavity-mild with puree, which appeared to clear with liquid wash. Pharyngeal Stage:    Pt presented with X1 immediate dry cough and X1 instance of immediate wet vocal quality (appeared to clear with pt phonation) following small sips of thin from cup; pt tolerated 1/3 trials of thin  w/o overt s/s of aspiration noted.      Pt presented with consistent multiple swallows post puree trials by tsp. Cognition:   Confusion noted    Oral Peripheral Examination   Suspect Generalized oral weakness based on limited oral motor exam due to cognitive deficits    Current Respiratory Status    room air     Parameters of Speech Production  Respiration:  Adequate for speech production  Quality:   Breathy at times  Intensity: Inconsistent    Volitional Swallow: not able to elicit     Volitional Cough:   present     Pain: Pt reported pain in her right hand (this hand noted to be lodged between pt and bed rail); SLP informed pt's nurse of this. EDUCATION:   The Speech Language Pathologist (SLP) completed education regarding results of evaluation and that intervention is warranted at this time. Learner: Patient  Education: Reviewed results and recommendations of this evaluation  Evaluation of Education:  No evidence of learning    This plan may be re-evaluated and revised as warranted. Evaluation Time includes thorough review of current medical information, gathering information on past medical history/social history and prior level of function, completion of standardized testing/informal observation of tasks, assessment of data and education on plan of care and goals. [x]The admitting diagnosis and active problem list, have been reviewed prior to initiation of this evaluation.         ACTIVE PROBLEM LIST:   Patient Active Problem List   Diagnosis    Hypertension    Depression    Arthritis    H/O vaginal bleeding    Dermatitis    Anxiety    Hyperlipidemia    Vitamin B12 deficiency    Vitamin D deficiency    Abnormal EKG    Enlarged heart    Postmenopausal bleeding    Obesity    Generalized weakness    Tibial fracture    SIRS (systemic inflammatory response syndrome) (HCC)    Polio    SHAWNA (acute kidney injury) ([address]    Sepsis due to urinary tract infection ([address]    Acute metabolic encephalopathy         CPT code:  92187  bedside swallow eval      Tx note (12379): SLP educated pt re: eval results and rec for NPO at this time; pt did not voice questions. SLP reviewed rec for MBSS to be completed and MBSS logistics in detail, of overt s/s of aspiration; no questions voiced. Materials discarded following session and pt left in room w/ callbell w/i reach.

## 2022-05-03 NOTE — PROGRESS NOTES
Comprehensive Nutrition Assessment    Type and Reason for Visit:  Initial,Consult,Wound    Nutrition Recommendations/Plan:   1. Continue NPO, will monitor for nutrition progression/speech recs and start ONS upon diet advancement to help meet increased nutrient needs for wounds. Malnutrition Assessment:  Malnutrition Status:  No malnutrition (05/03/22 1058)    Context:  Acute Illness     Findings of the 6 clinical characteristics of malnutrition:  Energy Intake:  Mild decrease in energy intake (Comment)  Weight Loss:  Unable to assess (2/2 lack of wt hx on file to assess)     Body Fat Loss:  No significant body fat loss     Muscle Mass Loss:  No significant muscle mass loss    Fluid Accumulation:  No significant fluid accumulation     Strength:  Not Performed    Nutrition Assessment:    Pt. admit for AMS 2/2 metabolic encephalopathy likely 2/2 UTI. Noted 5/1 RRT pt was found unresponsive & hypotensive (improved) and Pt was transferred to MICU. Noted hypokalemia resolved. PMHx noted for obesity, CHF. Noted wounds x2. Pt. is with increased nutrient needs for wound healing. Pt. is currently NPO but consuming ~% of most meals. Will monitor for nutrition progression. Nutrition Related Findings:    Oriented to person, +I/O (965ml), No edema, hypoactive BS, hyperglycemia, hypophosphatemia Wound Type: Wound Consult Pending,Multiple       Current Nutrition Intake & Therapies:    Average Meal Intake: NPO (Average of ~% of meals)  Average Supplements Intake: NPO  Diet NPO    Anthropometric Measures:  Height: 5' 2\" (157.5 cm)  Ideal Body Weight (IBW): 110 lbs (50 kg)    Admission Body Weight: 209 lb 6.4 oz (95 kg) (5/01 BS; Actual.)  Current Body Weight: 209 lb 6.4 oz (95 kg) (5/01 BS; Actual. CBW likely elevated), 190.4 % IBW.     Current BMI (kg/m2): 38.3  Usual Body Weight:  (MITCH d/t no significant wt hx on file to assess)     Weight Adjustment For: No Adjustment                 BMI Categories: Obese Class 2 (BMI 35.0 -39.9)    Estimated Daily Nutrient Needs:  Energy Requirements Based On: Formula     Energy (kcal/day): MSJ 1438x 1.2 SF =1700-1800kcal  Weight Used for Protein Requirements: Ideal  Protein (g/day): 65-75g (1.3-1.5g/kgIBW)  Method Used for Fluid Requirements: 1 ml/kcal  Fluid (ml/day): 1700-1800ml    Nutrition Diagnosis:   · Increased nutrient needs related to increase demand for energy/nutrients as evidenced by wounds      Nutrition Interventions:   Food and/or Nutrient Delivery: Continue NPO  Nutrition Education/Counseling: No recommendation at this time  Coordination of Nutrition Care: Continue to monitor while inpatient       Goals:     Goals: PO intake 50% or greater,Initiate PO diet,by next RD assessment       Nutrition Monitoring and Evaluation:   Behavioral-Environmental Outcomes: None Identified  Food/Nutrient Intake Outcomes: Diet Advancement/Tolerance  Physical Signs/Symptoms Outcomes: Biochemical Data,Nutrition Focused Physical Findings,Weight,Skin    Discharge Planning:     Too soon to determine     Giancarlo Montgomery RD  Contact: ext [phone]

## 2022-05-04 NOTE — CARE COORDINATION
Chart reviewed:  Patient in contact for ESBL in her urine. On IV Invanz x10 days ordered 5/1. PICC placed 5/2. Chronic lizama changed 5/1. Hgb 6.7 this am, patient needing 1 unit PRBC's today. Patient only alert to person at this time and responding to voice per nursing documentation. Patient does need a Video swallow as she failed a bedside yesterday. Only contact listed is her cousin Sonja Best, number continually ringing busy. Unable to get additional contacts from [facility] or [facility]. Patient admitted from [facility] at the [facility] and is a bed hold. No precert needed for return, just updated therapy and a COVID test the day of discharge. Envelope and transportation form in soft chart for transition of care planning. Will continue to follow.      Kyle Curtis RN.  [facility]  [phone]

## 2022-05-04 NOTE — PROGRESS NOTES
Department of Internal Medicine  Infectious Diseases  Progress  Note      C/C : E coli bacteremia, sepsis     Pt is more drowsy   No distress  Afebrile       Current Facility-Administered Medications   Medication Dose Route Frequency Provider Last Rate Last Admin    0.9 % sodium chloride infusion   IntraVENous PRN Fuentes Chao DO        midodrine (PROAMATINE) tablet 5 mg  5 mg Oral TID  Georgina Batista MD        acetaminophen (TYLENOL) tablet 650 mg  650 mg Oral Q6H PRN Kenzie Caruso MD        Or    acetaminophen (TYLENOL) suppository 650 mg  650 mg Rectal Q6H PRN Kenzie Caruso MD        sodium chloride flush 0.9 % injection 5-40 mL  5-40 mL IntraVENous 2 times per day Tabatha Kay MD   10 mL at 05/03/22 2034    sodium chloride flush 0.9 % injection 5-40 mL  5-40 mL IntraVENous PRN Tabatha Kay MD        0.9 % sodium chloride infusion   IntraVENous PRN Tabatha Kay MD        heparin flush 100 UNIT/ML injection 100 Units  1 mL IntraVENous 2 times per day Tabatha Kay MD   100 Units at 05/04/22 0838    heparin flush 100 UNIT/ML injection 100 Units  1 mL IntraCATHeter PRN Tabatha Kay MD        white petrolatum ointment   Topical Daily Tabatha Kay MD   Given at 05/04/22 0839    miconazole (MICOTIN) 2 % powder   Topical BID Kvng Robledo MD   Given at 05/04/22 0839    dextrose 5 % and 0.45 % NaCl with KCl 20 mEq infusion   IntraVENous Continuous Elton Anne MD 75 mL/hr at 05/04/22 0625 Rate Verify at 05/04/22 0625    sodium chloride flush 0.9 % injection 5-40 mL  5-40 mL IntraVENous 2 times per day Sobia Luong MD   10 mL at 05/04/22 0838    sodium chloride flush 0.9 % injection 5-40 mL  5-40 mL IntraVENous PRN Jayme Luong MD        0.9 % sodium chloride infusion   IntraVENous PRN Jayme Luong MD        heparin flush 100 UNIT/ML injection 300 Units  3 mL IntraVENous 2 times per day Sandra Root MD   300 Units at 05/03/22 2033  heparin flush 100 UNIT/ML injection 300 Units  3 mL IntraCATHeter PRN Jayme Luong MD        perflutren lipid microspheres (DEFINITY) injection 1.65 mg  1.5 mL IntraVENous ONCE PRN Keyur Reecer,         folic acid injection 1 mg  1 mg IntraVENous Daily Cat Douglas MD   1 mg at 05/04/22 0959    pantoprazole (PROTONIX) injection 40 mg  40 mg IntraVENous BID Pat Sanchez MD   40 mg at 05/04/22 0838    ertapenem (INVanz) 1000 mg IVPB minibag  1,000 mg IntraVENous Q24H Kacie Marcial MD   Stopped at 05/04/22 1059    [Held by provider] clopidogrel (PLAVIX) tablet 75 mg  75 mg Oral Daily Maximus Littlejohn MD   75 mg at 05/01/22 0838    [START ON 5/5/2022] vitamin D (ERGOCALCIFEROL) capsule 50,000 Units  50,000 Units Oral Once per day on Thu Glen Das MD        [Held by provider] ferrous sulfate (IRON 325) tablet 325 mg  325 mg Oral Every Other Day Glen Das MD   325 mg at 04/30/22 2126    fluticasone (FLONASE) 50 MCG/ACT nasal spray 2 spray  2 spray Each Nostril Daily Glen Das MD   2 spray at 05/04/22 0840    [Held by provider] furosemide (LASIX) tablet 40 mg  40 mg Oral Daily Glen Das MD   40 mg at 05/01/22 0837    [Held by provider] magnesium oxide (MAG-OX) tablet 400 mg  400 mg Oral Daily Glen Das MD   400 mg at 05/01/22 0838    [Held by provider] oxybutynin (DITROPAN-XL) extended release tablet 10 mg  10 mg Oral Daily Glen Das MD   10 mg at 05/01/22 0838    [Held by provider] potassium bicarb-citric acid (EFFER-K) effervescent tablet 20 mEq  20 mEq Oral Daily Glen Das MD   20 mEq at 05/01/22 0838    sertraline (ZOLOFT) tablet 50 mg  50 mg Oral Daily Glen Das MD   50 mg at 05/01/22 0838    atorvastatin (LIPITOR) tablet 10 mg  10 mg Oral Daily Glen Das MD   10 mg at 05/01/22 0837    [Held by provider] trospium (SANCTURA) tablet 20 mg  20 mg Oral Nightly Glen Das MD   20 mg at 04/30/22 2126    [Held by provider] enoxaparin (LOVENOX) injection 40 mg  40 mg SubCUTAneous Daily Vianey Vasquez MD   40 mg at 05/02/22 0917    polyethylene glycol (GLYCOLAX) packet 17 g  17 g Oral Daily PRN Vianey Vasquez MD        ipratropium-albuterol (DUONEB) nebulizer solution 1 ampule  1 ampule Inhalation Q4H PRN Vianey Vasquez MD                PHYSICAL EXAM:      Vitals:     Vitals:    05/04/22 1520   BP: 121/62   Pulse: 80   Resp: 18   Temp: 97.1 °F (36.2 °C)   SpO2: 96%       General Appearance:    Drowsy. Head:    Normocephalic, atraumatic   Eyes:    No pallor, no icterus,   Ears:    No obvious deformity or drainage.    Nose:   No nasal drainage   Throat:   Mucosa moist, no oral thrush   Neck:   Supple, no lymphadenopathy   Back:     no CVA tenderness   Lungs:     Clear to auscultation bilaterally, no wheeze    Heart:    Regular rate and rhythm, no murmur   Abdomen:     Soft, non-tender, bowel sounds present    Extremities:   No edema, no cyanosis    Pulses:   Dorsalis pedis palpable    Skin:   no rashes       CBC with Differential:      Lab Results   Component Value Date    WBC 5.4 05/04/2022    RBC 2.43 05/04/2022    HGB 6.7 05/04/2022    HCT 20.7 05/04/2022    PLT 37 05/04/2022    MCV 85.2 05/04/2022    MCH 27.6 05/04/2022    MCHC 32.4 05/04/2022    RDW 16.6 05/04/2022    NRBC 0.9 05/03/2022    METASPCT 0.9 05/01/2022    LYMPHOPCT 17.7 05/04/2022    MONOPCT 4.4 05/04/2022    MYELOPCT 0.9 05/01/2022    BASOPCT 0.2 05/04/2022    MONOSABS 0.22 05/04/2022    LYMPHSABS 0.97 05/04/2022    EOSABS 0.29 05/04/2022    BASOSABS 0.00 05/04/2022       CMP     Lab Results   Component Value Date     05/04/2022    K 4.8 05/04/2022    K 4.8 05/04/2022     05/04/2022    CO2 23 05/04/2022    BUN 14 05/04/2022    CREATININE 0.6 05/04/2022    GFRAA >60 05/04/2022    LABGLOM >60 05/04/2022    GLUCOSE 404 05/04/2022    PROT 4.3 05/04/2022    LABALBU 1.7 05/04/2022    CALCIUM 7.0 05/04/2022    BILITOT 0.2 05/04/2022    ALKPHOS 83 05/04/2022    AST 8 05/04/2022    ALT 6 05/04/2022         Hepatic Function Panel:    Lab Results   Component Value Date    ALKPHOS 83 05/04/2022    ALT 6 05/04/2022    AST 8 05/04/2022    PROT 4.3 05/04/2022    BILITOT 0.2 05/04/2022    LABALBU 1.7 05/04/2022       PT/INR:  No results found for: PROTIME, INR    TSH:    Lab Results   Component Value Date    TSH 2.860 05/02/2022       U/A:    Lab Results   Component Value Date    COLORU OTHER 04/30/2022    PHUR 8.5 04/30/2022    LABCAST FEW 02/07/2015    WBCUA PACKED 04/30/2022    RBCUA PACKED 04/30/2022    BACTERIA MANY 04/30/2022    CLARITYU TURBID 04/30/2022    SPECGRAV 1.010 04/30/2022    LEUKOCYTESUR LARGE 04/30/2022    UROBILINOGEN 1.0 04/30/2022    BILIRUBINUR LARGE 04/30/2022    BLOODU LARGE 04/30/2022    GLUCOSEU Negative 04/30/2022    AMORPHOUS PRESENT 03/27/2022       ABG:  No results found for: Urszula Kwadwo, PHART, THGBART, JUA5AII, PO2ART, SKV2GXN    MICROBIOLOGY:    Blood culture -    Culture, Blood 1 [[id number]] (Abnormal)     Collected: 04/30/22 1541    Updated: 05/04/22 1049    Specimen Source: Blood     Organism Escherichia coli Abnormal      Providencia stuartii Abnormal    Narrative:     Source: BLOOD       Site:           Previous panic on this admission - call not needed per   SOP, 05/01/2022 10:55,              Enterobacteriaceae by PCR DETECTED Panic       Enterobacter cloacae complex by PCR Not Detected    Enterococcus faecalis by PCR Not Detected    Enterococcus faecium by PCR Not Detected    Escherichia coli by PCR DETECTED Panic      Haemophilus Influenzae by PCR Not Detected    Klebsiella aerogenes by PCR Not Detected    Klebsiella oxytoca by PCR Not Detected    Klebsiella pneumoniae group by PCR Not Detected    Listeria monocytogenes by PCR Not Detected    Neisseria meningitidis by PCR Not Detected    Proteus species by PCR Not Detected    Pseudomonas aeruginosa by PCR Not Detected    Salmonella species by PCR Not Detected    Streptococcus agalactiae by PCR Not Detected    Staphylococcus aureus by PCR Not Detected    Staphylococcus epidermidis by PCR Not Detected    Staphylococcus lugdunensis by PCR Not Detected    Staphylococcus species by PCR Not Detected    Serratia marcescens by PCR Not Detected    Streptococcus pneumoniae by PCR Not Detected    Streptococcus pyogenes  by PCR Not Detected    Streptococcus species by PCR Not Detected    Stenotrophomonas maltophilia by PCR Not Detected    Candida albicans by PCR Not Detected    Candida auris by PCR Not Detected    Candida glabrata by PCR Not Detected    Candida krusei by PCR Not Detected    Candida parapsilosis by PCR Not Detected    Candida tropicalis by PCR Not Detected    Cryptococcus neoformans/gattii by PCR Not Detected    Cephalosporin Resistance CTX-M gene by PCR DETECTED Panic      Carbapenem Resistance IMP gene by PCR Not Detected    Carbapenem Resistance KPC by PCR Not Detected    Colistin Resistance mcr-1 gene by PCR Not Detected    Carbapenem Resistance NDM gene by PCR Not Detected    Carbapenem Resistance OXA-48 gene by PCR Not Detected    Carbapenem Resistance VIM gene Detected Not Detected           Urine Culture -    10 to 100,000 CFU/mL   Mixed katie isolated. Further workup and sensitivity testing   is not routinely indicated and will not be performed. Mixed katie isolated includes:   Mixed gram negative rods   Proteus species   Nonhemolytic Strep species    Narrative:           Radiology :    Chest X ray : no infiltrates       IMPRESSION:     1 ESBL ( CTX M ) E coli , Providencia bacteremia, sepsis   2. CAUTI ( Cobos catheter was changed )   3. Leukocytosis - improved       RECOMMENDATIONS:      1. IV invanz 1 gram q 24 hrs ~ 10 days   2.  Contact isolation

## 2022-05-04 NOTE — CARE COORDINATION
Call placed to Migdalia and subsequently Arelis serrano with [facility] at the [facility] re: additional contacts for this patient as Chelsea's number is continually ringing busy. Arelis serrano stated they have a CM listed, Ange Larryjann, [phone]. Call placed to Guilherme Garsia and directed to call Fay Patel at [phone], as she no longer works for [employer]. Call placed to [facility] and detailed VM left with number for return call re: this case and possible additional contacts. Await return call.       Roshni Vázquez RN.  [facility]  [phone]

## 2022-05-04 NOTE — CONSULTS
Inpatient Hematology/Oncology Consult Note      Reason for Visit:   Anemia, thrombocytopenia    Referring Physician:  Steph Milan MD     PCP:  Luciano Karimi MD    History of Present Illness:  Ms. Leti Patterson is a 77-year-old female patient with past medical history significant for hypertension, CKD, hyperlipidemia, CHF, TIA, anemia, and depression admitted 4/30/2022 with altered mental status from her nursing facility. Upon admission, WBC 19.5, hemoglobin 10.6, hematocrit 33.2, platelets 583, sodium 145, potassium 3.3, BUN 23, creatinine 0.8, calcium 8.4, lactic acid 1.9. Urinalysis revealed positive nitrites, large amount of leukocyte esterase positive WBC, positive RBC indicative of UTI. Urine culture from 3/28/2022 positive E. Coli. Blood culture x1 on 4/30/2022 positive E. Coli. ID is following. On 5/1/2022 patient developed hypotension prompting a transfer to the ICU. NICOM was obtained and indicated patient was fluid responsive. She received a total of 1.5 L bolus and blood pressure improved. She was also noted to have a drop in hemoglobin and developed worsening thrombocytopenia. On 5/1/2022 her hemoglobin dropped from 11.3-8.4. Hemoglobin then dropped to 6.9 on 5/2/2022 requiring transfusion. Prior to her transfusion iron studies were obtained. Iron 82, ferritin 1482, iron saturation cannot be calculated due to unsaturated iron binding capacity result being below analytical measuring range. Vitamin B12 965, folate less than 2. She has been started on intravenous folic acid. , Peripheral blood smear revealing thrombocytopenia which may be related to decreased production or increased destruction. Normocytic anemia. Leukocytosis with neutrophilia and absolute lymphocytes, monocytes, eosinophils are decreased. Blood work done prior to admission on 3/27/2022 revealed a normal hemoglobin of 13.7, hematocrit 44, and mild thrombocytopenia with platelets 418.   We have been asked to see her regarding anemia and thrombocytopenia in the setting of sepsis.     Review of Systems;  ROS unable to be obtained due to AMS    Past Medical History:      Diagnosis Date    Acute kidney failure ([address]     Anemia     Anxiety     Cardiomyopathy ([address] 3/24/2014    Chlamydia     Chronic back pain     Depression     Difficulty in walking     Enlarged heart     Essential hypertension     Genital warts     Headache(784.0)     Heart failure ([address]     Hernia 3/2014    Hyperlipidemia     Hypertension     Malignant neoplasm of endometrium (HCC)     Neuropathy     Obesity     Osteoarthritis     Polio     Post traumatic stress disorder     Postmenopausal vaginal bleeding     Urinary incontinence        Past Surgical History:      Procedure Laterality Date    BREAST SURGERY      left benign cyst    BREAST SURGERY  7/86    CATARACT REMOVAL      COLONOSCOPY  3/1/2014    DILATION AND CURETTAGE OF UTERUS  04/29/2014    EYE SURGERY  9/2003 , 4/2014    HYSTEROSCOPY  04/29/2014    WISDOM TOOTH EXTRACTION       Family History:  Family History   Problem Relation Age of Onset    Cancer Mother         ovarian cancer    Heart Disease Mother         CHF    Other Mother         Hepatitis    Arthritis Mother     Diabetes Mother     High Blood Pressure Mother     High Cholesterol Mother     Kidney Disease Mother     Obesity Mother     Diabetes Father     Other Father         Cirrohis of liver and Black Lung    Alcohol Abuse Father     Arthritis Father     High Blood Pressure Father     High Cholesterol Father     Obesity Father     Arthritis Brother     High Blood Pressure Brother     High Cholesterol Brother     Arthritis Maternal Aunt     High Blood Pressure Maternal Aunt     Stroke Maternal Aunt     Arthritis Maternal Uncle     Heart Disease Maternal Uncle     Stroke Maternal Uncle     Arthritis Paternal Uncle     High Blood Pressure Paternal Uncle     High Cholesterol Paternal Uncle     Stroke Paternal Uncle     Diabetes Maternal Grandmother     Heart Disease Maternal Grandmother     Diabetes Paternal Grandmother      Medications:  Medications reviewed and reconciled. Social History:  Social History     Socioeconomic History    Marital status: Single     Spouse name: Not on file    Number of children: Not on file    Years of education: Not on file    Highest education level: Not on file   Occupational History    Occupation:      Employer: [employer]     Comment: [employer]   Tobacco Use    Smoking status: Never Smoker    Smokeless tobacco: Never Used   Substance and Sexual Activity    Alcohol use: No     Comment: rarely    Drug use: No    Sexual activity: Not Currently   Other Topics Concern    Not on file   Social History Narrative    Not on file     Social Determinants of Health     Financial Resource Strain:     Difficulty of Paying Living Expenses: Not on file   Food Insecurity:     Worried About [employer] in the Last Year: Not on file    Brittny of Food in the Last Year: Not on file   Transportation Needs:     Lack of Transportation (Medical): Not on file    Lack of Transportation (Non-Medical):  Not on file   Physical Activity:     Days of Exercise per Week: Not on file    Minutes of Exercise per Session: Not on file   Stress:     Feeling of Stress : Not on file   Social Connections:     Frequency of Communication with Friends and Family: Not on file    Frequency of Social Gatherings with Friends and Family: Not on file    Attends Yazidism Services: Not on file    Active Member of Clubs or Organizations: Not on file    Attends Club or Organization Meetings: Not on file    Marital Status: Not on file   Intimate Partner Violence:     Fear of Current or Ex-Partner: Not on file    Emotionally Abused: Not on file    Physically Abused: Not on file    Sexually Abused: Not on file   Housing Stability:     Unable to Pay for Housing in the Last Year: Not on file    Number of Places Lived in the Last Year: Not on file    Unstable Housing in the Last Year: Not on file     Allergies:  No Known Allergies    Physical Exam:  /62   Pulse 80   Temp 97.1 °F (36.2 °C) (Temporal)   Resp 18   Ht 5' 2\" (1.575 m)   Wt 230 lb 11.2 oz (104.6 kg)   SpO2 96%   BMI 42.20 kg/m²   GENERAL: Opens eyes, not following command or answering questions. No obvious signs of distress. HEENT: PERRLA; EOMI. Oropharynx clear. NECK: Supple. Without lymphadenopathy. LUNGS: Decreased air entry bilaterally. No wheezing, crackles or ronchi. CARDIOVASCULAR: Regular rate. No murmurs, rubs or gallops. ABDOMEN: Soft. Non-tender, non-distended. Positive bowel sounds. EXTREMITIES: Without clubbing or cyanosis. Extensive bruising to BUE. Dependent edema to BUE. NEUROLOGIC: No focal deficits. Lab Results   Component Value Date    WBC 5.4 05/04/2022    HGB 6.7 (LL) 05/04/2022    HCT 20.7 (L) 05/04/2022    MCV 85.2 05/04/2022    PLT 37 (L) 05/04/2022     Lab Results   Component Value Date     05/04/2022    K 4.8 05/04/2022    K 4.8 05/04/2022     (H) 05/04/2022    CO2 23 05/04/2022    BUN 14 05/04/2022    CREATININE 0.6 05/04/2022    GLUCOSE 404 (H) 05/04/2022    CALCIUM 7.0 (L) 05/04/2022    PROT 4.3 (L) 05/04/2022    LABALBU 1.7 (L) 05/04/2022    BILITOT 0.2 05/04/2022    ALKPHOS 83 05/04/2022    AST 8 05/04/2022    ALT 6 05/04/2022    LABGLOM >60 05/04/2022    GFRAA >60 05/04/2022     No results found for: INR, PROTIME    No results found for: APTT    Lab Results   Component Value Date    FOLATE <2.0 (L) 05/02/2022     Lab Results   Component Value Date    WOZQEOZE40 444 (H) 05/02/2022     Lab Results   Component Value Date    TSH 2.860 05/02/2022     Impression/Plan:  Ms. Bessie Breen is a 78-year-old female patient with past medical history significant for hypertension, CKD, hyperlipidemia, CHF, TIA, anemia, and depression admitted 4/30/2022 with altered mental status from her nursing facility.   Upon admission, WBC 19.5, hemoglobin 10.6, hematocrit 33.2, platelets 651, sodium 145, potassium 3.3, BUN 23, creatinine 0.8, calcium 8.4, lactic acid 1.9. Urinalysis revealed positive nitrites, large amount of leukocyte esterase positive WBC, positive RBC indicative of UTI. Urine culture from 3/28/2022 positive E. Coli. Blood culture x1 on 4/30/2022 positive E. Coli. ID is following. On 5/1/2022 patient developed hypotension prompting a transfer to the ICU. NICOM was obtained and indicated patient was fluid responsive. She received a total of 1.5 L bolus and blood pressure improved. She was also noted to have a drop in hemoglobin and developed worsening thrombocytopenia. On 5/1/2022 her hemoglobin dropped from 11.3-8.4. Hemoglobin then dropped to 6.9 on 5/2/2022 requiring transfusion. Prior to her transfusion iron studies were obtained. Iron 82, ferritin 1482, iron saturation cannot be calculated due to unsaturated iron binding capacity result being below analytical measuring range. Vitamin B12 965, folate less than 2. She has been started on intravenous folic acid. , Peripheral blood smear revealing thrombocytopenia which may be related to decreased production or increased destruction. Normocytic anemia. Leukocytosis with neutrophilia and absolute lymphocytes, monocytes, eosinophils are decreased. Blood work done prior to admission on 3/27/2022 revealed a normal hemoglobin of 13.7, hematocrit 44, and mild thrombocytopenia with platelets 591. We have been asked to see her regarding anemia and thrombocytopenia in the setting of sepsis. -New onset anemia and thrombocytopenia likely secondary to sepsis and inflammation. -ID following. On Invanz.   -Peripheral blood smear revealing thrombocytopenia which may be related to decreased production or increased destruction. Normocytic anemia.   Leukocytosis with neutrophilia and absolute lymphocytes, monocytes, eosinophils are decreased.  -Anemia/Thrombocytopenia workup ordered.  -Folate deficient. IV Folic acid ordered  -Monitor CBCD closely. Tranfuse to keep Hgb >7, PLT >10. Receiving pRBC today.  -Will continue to follow    Thank you for allowing us to participate in the care of Serena Krause. OSMIN Bhakta  5/4/2022    The patient was seen and examined, I agree with OSMIN Bhakta's findings, assessment and plan as outlined.   05/04/2022  Vanda Hines MD

## 2022-05-04 NOTE — PROGRESS NOTES
Hospitalist Progress Note      PCP: Roberto Carlos Galvan MD    Date of Admission: 4/30/2022    Hospital Course:   \" 25-year-old nursing home resident with chronic lizama ,presents with altered mental status. EMS reports nursing home states she was unresponsive except to pain and was disoriented. Patient was found to have inner tract infection in the ED and she was started on Rocephin. complicated by hypotension requiring ICU stay. \"       Subjective:   Pt is more lethargic. She is still NPO.     Medications:  Reviewed    Infusion Medications    sodium chloride      sodium chloride      dextrose 5% and 0.45% NaCl with KCl 20 mEq 75 mL/hr at 05/04/22 0625    sodium chloride       Scheduled Medications    midodrine  5 mg Oral TID WC    sodium chloride flush  5-40 mL IntraVENous 2 times per day    heparin flush  1 mL IntraVENous 2 times per day    white petrolatum   Topical Daily    miconazole   Topical BID    sodium chloride flush  5-40 mL IntraVENous 2 times per day    heparin flush  3 mL IntraVENous 2 times per day    folic acid  1 mg IntraVENous Daily    pantoprazole  40 mg IntraVENous BID    ertapenem (INVanz) IVPB  1,000 mg IntraVENous Q24H    [Held by provider] clopidogrel  75 mg Oral Daily    [START ON 5/5/2022] vitamin D  50,000 Units Oral Once per day on Thu    [Held by provider] ferrous sulfate  325 mg Oral Every Other Day    fluticasone  2 spray Each Nostril Daily    [Held by provider] furosemide  40 mg Oral Daily    [Held by provider] magnesium oxide  400 mg Oral Daily    [Held by provider] oxybutynin  10 mg Oral Daily    [Held by provider] potassium bicarb-citric acid  20 mEq Oral Daily    sertraline  50 mg Oral Daily    atorvastatin  10 mg Oral Daily    [Held by provider] trospium  20 mg Oral Nightly    [Held by provider] enoxaparin  40 mg SubCUTAneous Daily     PRN Meds: sodium chloride, acetaminophen **OR** acetaminophen, sodium chloride flush, sodium chloride, heparin flush, sodium chloride flush, sodium chloride, heparin flush, perflutren lipid microspheres, polyethylene glycol, ipratropium-albuterol      Intake/Output Summary (Last 24 hours) at 5/4/2022 1120  Last data filed at 5/4/2022 1004  Gross per 24 hour   Intake 2456.73 ml   Output 770 ml   Net 1686.73 ml       Physical Exam Performed:    /60   Pulse 81   Temp 97 °F (36.1 °C) (Temporal)   Resp 14   Ht 5' 2\" (1.575 m)   Wt 230 lb 11.2 oz (104.6 kg)   SpO2 95%   BMI 42.20 kg/m²     General appearance: No apparent distress, appears stated age. Lethargic. HEENT: Pupils equal, round, and reactive to light. Conjunctivae/corneas clear. Neck: Supple, with full range of motion. No jugular venous distention. Trachea midline. Respiratory:  Normal respiratory effort. Clear to auscultation, bilaterally without Rales/Wheezes/Rhonchi. Cardiovascular: Regular rate and rhythm with normal S1/S2 without murmurs, rubs or gallops. Abdomen: Soft, non-tender, non-distended with normal bowel sounds. Musculoskeletal: No clubbing, cyanosis or edema bilaterally. Full range of motion without deformity. Skin: Skin color, texture, turgor normal.  No rashes or lesions. Neurologic:  Moving ext  Psychiatric: Alert and oriented x 2      Labs:   Recent Labs     05/02/22  0900 05/02/22  1545 05/03/22  0611 05/03/22  0813 05/03/22  2228 05/04/22  0410 05/04/22  0540   WBC 20.5*  --  9.5  --   --   --  5.4   HGB 8.1*   < > 7.2*   < > 7.1* 6.6* 6.7*   HCT 24.9*   < > 23.4*   < > 22.6* 20.8* 20.7*   PLT 70*  --  47*  --   --   --  37*    < > = values in this interval not displayed.      Recent Labs     05/01/22  2320 05/01/22  2320 05/02/22  0900 05/02/22  0900 05/03/22  0611 05/04/22  0540      < > 141  142  --  144 138   K 2.9*   < > 4.1  3.9   < > 3.6  3.6 4.8  4.8   *   < > 109*  108*  --  111* 109*   CO2 23   < > 23  23  --  22 23   BUN 27*   < > 26*  26*  --  20 14   CREATININE 1.0   < > 1.0  1.0  --  0.8 0.6   CALCIUM 7.7*   < > 7.6*  7.7*  --  7.2* 7.0*   PHOS 1.7*  --   --   --  1.8*  --     < > = values in this interval not displayed. Recent Labs     05/02/22  0900 05/03/22  0611 05/04/22  0540   AST 6 7 8   ALT 5 5 6   BILITOT 0.3 0.3 0.2   ALKPHOS 109* 91 83     No results for input(s): INR in the last 72 hours. No results for input(s): Mark Clap in the last 72 hours. Urinalysis:      Lab Results   Component Value Date    NITRU POSITIVE 04/30/2022    WBCUA PACKED 04/30/2022    BACTERIA MANY 04/30/2022    RBCUA PACKED 04/30/2022    BLOODU LARGE 04/30/2022    SPECGRAV 1.010 04/30/2022    GLUCOSEU Negative 04/30/2022       Radiology:  XR CHEST (2 VW)   Final Result   Cardiomegaly without signs of congestion         CT Head WO Contrast   Final Result   No acute intracranial abnormality. There is age-appropriate atrophy and   small-vessel ischemic disease. CT chest.      There is borderline cardiomegaly. There is mediastinal lipomatosis. There   is mild ectasia of the ascending thoracic aorta measuring 3.7 x 3.5 cm. The   trachea and major bronchi are patent. Nonenlarged mediastinal lymph nodes   are present. There is mild peribronchial thickening concerning for   bronchitis with minimal atelectasis in the lung bases. There is no focal   consolidation or pleural effusion. Liver is of normal architecture. Bilateral adrenal nodules are present with larger 1 on the right side   measuring 2.3 x 3.9 cm degenerative changes are identified in the thoracic   spine. Impression      Mild cardiomegaly with bronchitis and atelectasis in the lung bases. There   is no focal consolidation or edema. Bilateral adrenal nodules. RECOMMENDATIONS:   Unavailable         CT CHEST WO CONTRAST   Final Result   No acute intracranial abnormality. There is age-appropriate atrophy and   small-vessel ischemic disease. CT chest.      There is borderline cardiomegaly. There is mediastinal lipomatosis. There   is mild ectasia of the ascending thoracic aorta measuring 3.7 x 3.5 cm. The   trachea and major bronchi are patent. Nonenlarged mediastinal lymph nodes   are present. There is mild peribronchial thickening concerning for   bronchitis with minimal atelectasis in the lung bases. There is no focal   consolidation or pleural effusion. Liver is of normal architecture. Bilateral adrenal nodules are present with larger 1 on the right side   measuring 2.3 x 3.9 cm degenerative changes are identified in the thoracic   spine. Impression      Mild cardiomegaly with bronchitis and atelectasis in the lung bases. There   is no focal consolidation or edema. Bilateral adrenal nodules. RECOMMENDATIONS:   Unavailable         XR CHEST PORTABLE   Final Result   Rotational artifact with either artifactual prominence of the right hilum or   a right hilar mass. Recommend follow-up with two view chest radiograph or   chest CT to exclude a neoplastic process. FL MODIFIED BARIUM SWALLOW W VIDEO    (Results Pending)   CT HEAD WO CONTRAST    (Results Pending)           Assessment/Plan:    Active Hospital Problems    Diagnosis     Acute metabolic encephalopathy [J22.04]      Priority: Medium     Hypotension-due to hypovolemia/sepsis  -BP improved but borderline. Continue IVF. -Critical care was consulted. Patient was given midodrine. Currently NPO thus not getting oral medications,SLP to assess. Recommend echocardiogram.Held lasix. Acute metabolic encephalopathy-likely secondary to urinary tract infection/bacteremia  -CT head non acute X 2  -Check ammonia levels and ABG  -Continue antibiotic for bacteremia  -MRI if no improvement    ESBL E. coli bacteremia  -ID consulted. Continue ivanz x 10days  -Urine culture shows mixed katie  -No pneumonia on CT chest    Hypokalemia  -Monitor and replace     Acute on Chronic anemia-suspect bone marrow suppression.  -Hgb <7.0,Transfuse PRBC. Consent to be obtained from family. Decrease frequency of blood draws.  -Hold Plavix, Lovenox  -Check FOBT    Thrombocytopenia-likely due to bacteremia  -Platelets trending down. Transfuse as needed. Hold Lovenox. Consult Oncology.     Other medical issues.   CHF  HLD  History of chronic back pain and impaired mobility  Hx of polio  Hx of endometrial ca  Hx of uti in march 2022  Hx of intara abdomin infection  Dec 2021    DVT Prophylaxis: SCDs due to anemia  Diet: Diet NPO  Code Status: Limited    PT/OT Eval Status: as needed    Dispo - SNF in [address] [place] [address], MD

## 2022-05-05 NOTE — FLOWSHEET NOTE
Inpatient Wound Care (Initial consult) [id number]    Admit Date: 4/30/2022 12:57 PM    Reason for consult:  Right buttock    Significant history:  Per H&P    Chief Complaint:  AMS        History Of Present Illness:       Patient is a nursing home resident with chronic lizama ,presents with altered mental status. EMS reports nursing home states she was unresponsive except to pain and was disoriented. Patient was found to have inner tract infection in the ED and she was started on Rocephin  During my interview patient is awake alert oriented x2-3  She has no complain, she feels significantly better, no pain or discomfort  Patient denies any chest pain shortness of breath, abdominal pain nausea vomiting, headache or visual changes or lightheadedness, weakness or numbness or tingling in the extremities.     Findings:      05/05/22 0917   Skin Integumentary    Skin Integrity Ecchymosis   Location BUE   Skin Integrity Site 2   Skin Integrity Location 2   (redness, blanchable)   Location 2 Bilateral heels   Skin Integrity Site 3   Skin Integrity Location 3   (dry flaky)    Location 3   (BLE and feet)   Skin Integrity Site 4   Skin Integrity Location 4 Excoriation   Location 4 abdominal folds   Skin Integrity Site 5   Skin Integrity Location 5   (dry flaky, redness blanchable)   Location 5 bilateral buttocks   Wound 03/27/22 Buttocks Right   Date First Assessed/Time First Assessed: 03/27/22 1602   Present on Hospital Admission: Yes  Primary Wound Type: Pressure Injury  Location: Buttocks  Wound Location Orientation: Right   Wound Image    Wound Etiology Pressure Stage 2   Dressing/Treatment Pharmaceutical agent (see MAR)   Wound Length (cm) 1.5 cm   Wound Width (cm) 1 cm   Wound Depth (cm) 0.1 cm   Wound Surface Area (cm^2) 1.5 cm^2   Change in Wound Size % (l*w) -85.19   Wound Volume (cm^3) 0.15 cm^3   Wound Assessment Pink/red   Drainage Amount Scant   Drainage Description Serosanguinous   Odor None   Oneyda-wound Assessment Dry/flaky      **Informed Consent**     photos taken of wound and inserted into their chart as part of their permanent medical record for purposes of documentation, treatment management and/or medical review. All Images taken on 5/5/22 of patient name: Mandi Painting were transmitted and stored on secured [employer] located within [facility] by a registered [employer] Mobile Application Device.      Impression:  Right buttock: stage 2    Plan: Aquaphor  Interdry  Medix heel boots  TAPS  Patient will need continued preventative care      Ryley Jain RN 5/5/2022 9:20 AM

## 2022-05-05 NOTE — PROGRESS NOTES
Inpatient Hematology/Oncology Progress Note    Subjective:  No fever. Objective:  /64   Pulse 80   Temp 97.8 °F (36.6 °C) (Temporal)   Resp 16   Ht 5' 2\" (1.575 m)   Wt 231 lb 3 oz (104.9 kg)   SpO2 98%   BMI 42.28 kg/m²   GENERAL: Opens eyes, not following command or answering questions. HEENT: PERRLA; EOMI. Oropharynx clear. NECK: Supple. Without lymphadenopathy. LUNGS: Decreased air entry bilaterally. CARDIOVASCULAR: Regular rate. No murmurs, rubs or gallops. ABDOMEN: Soft. Non-tender, non-distended. EXTREMITIES: Extensive bruising to BUE. NEUROLOGIC: No focal deficits. Diagnostics  Lab Results   Component Value Date    WBC 5.9 05/05/2022    HGB 8.1 (L) 05/05/2022    HCT 24.6 (L) 05/05/2022    MCV 88.6 05/05/2022    PLT 29 (L) 05/05/2022     Lab Results   Component Value Date     05/05/2022    K 4.8 05/05/2022     (H) 05/05/2022    CO2 20 (L) 05/05/2022    BUN 11 05/05/2022    CREATININE 0.6 05/05/2022    GLUCOSE 386 (H) 05/05/2022    CALCIUM 6.8 (L) 05/05/2022    PROT 4.2 (L) 05/05/2022    LABALBU 1.6 (L) 05/05/2022    BILITOT 0.4 05/05/2022    ALKPHOS 79 05/05/2022    AST 10 05/05/2022    ALT 6 05/05/2022    LABGLOM >60 05/05/2022    GFRAA >60 05/05/2022     Impression/Plan:  55-year-old female patient with past medical history significant for hypertension, CKD, hyperlipidemia, CHF, TIA, anemia, and depression admitted 4/30/2022 with altered mental status from her nursing facility. Upon admission, WBC 19.5, hemoglobin 10.6, hematocrit 33.2, platelets 990, sodium 145, potassium 3.3, BUN 23, creatinine 0.8, calcium 8.4, lactic acid 1.9. Urinalysis revealed positive nitrites, large amount of leukocyte esterase positive WBC, positive RBC indicative of UTI. Urine culture from 3/28/2022 positive E. Coli. Blood culture x1 on 4/30/2022 positive E. Coli. ID is following.     On 5/1/2022 patient developed hypotension prompting a transfer to the ICU.   NICOM was obtained and indicated patient was fluid responsive. She received a total of 1.5 L bolus and blood pressure improved. She was also noted to have a drop in hemoglobin and developed worsening thrombocytopenia. On 5/1/2022 her hemoglobin dropped from 11.3-8.4. Hemoglobin then dropped to 6.9 on 5/2/2022 requiring transfusion. Prior to her transfusion iron studies were obtained. Iron 82, ferritin 1482, iron saturation cannot be calculated due to unsaturated iron binding capacity result being below analytical measuring range. Vitamin B12 965, folate less than 2. She has been started on intravenous folic acid. , Peripheral blood smear revealing thrombocytopenia which may be related to decreased production or increased destruction. Normocytic anemia. Leukocytosis with neutrophilia and absolute lymphocytes, monocytes, eosinophils are decreased.     Blood work done prior to admission on 3/27/2022 revealed a normal hemoglobin of 13.7, hematocrit 44, and mild thrombocytopenia with platelets 660. We have been asked to see her regarding anemia and thrombocytopenia in the setting of sepsis.     New onset anemia and thrombocytopenia likely secondary to sepsis and inflammation. ID following. On Deion Gold till 05/15/2022. Contact isolation  Peripheral blood smear revealing thrombocytopenia which may be related to decreased production or increased destruction. Normocytic anemia. Leukocytosis with neutrophilia and absolute lymphocytes, monocytes, eosinophils are decreased. Anemia/Thrombocytopenia workup ordered. Haptoglobin 242  RF 11 (0-13)  HIV Non-Reactive  Acute hepatitis panel Non-Reactive  Folate deficient. IV Folic acid ordered  Monitor CBC with diff. Tranfuse to keep Hgb >7, PLT >10.    Will continue to follow    05/05/2022  Geovanna Goel MD

## 2022-05-05 NOTE — PROGRESS NOTES
Hospitalist Progress Note      PCP: Luci Patel MD    Date of Admission: 4/30/2022    Hospital Course:   \" 49-year-old nursing home resident with chronic lizama ,presents with altered mental status. EMS reports nursing home states she was unresponsive except to pain and was disoriented. Patient was found to have inner tract infection in the ED and she was started on Rocephin. complicated by hypotension requiring ICU stay. \"       Subjective:   She is alert today. She responds to voice. She has low phonation.     Medications:  Reviewed    Infusion Medications    sodium chloride      sodium chloride      dextrose 5% and 0.45% NaCl with KCl 20 mEq 75 mL/hr at 05/05/22 0600    sodium chloride       Scheduled Medications    white petrolatum   Topical BID    midodrine  5 mg Oral TID WC    sodium chloride flush  5-40 mL IntraVENous 2 times per day    heparin flush  1 mL IntraVENous 2 times per day    sodium chloride flush  5-40 mL IntraVENous 2 times per day    heparin flush  3 mL IntraVENous 2 times per day    folic acid  1 mg IntraVENous Daily    pantoprazole  40 mg IntraVENous BID    ertapenem (INVanz) IVPB  1,000 mg IntraVENous Q24H    [Held by provider] clopidogrel  75 mg Oral Daily    vitamin D  50,000 Units Oral Once per day on Thu    [Held by provider] ferrous sulfate  325 mg Oral Every Other Day    fluticasone  2 spray Each Nostril Daily    [Held by provider] furosemide  40 mg Oral Daily    [Held by provider] magnesium oxide  400 mg Oral Daily    [Held by provider] oxybutynin  10 mg Oral Daily    [Held by provider] potassium bicarb-citric acid  20 mEq Oral Daily    sertraline  50 mg Oral Daily    atorvastatin  10 mg Oral Daily    [Held by provider] trospium  20 mg Oral Nightly    [Held by provider] enoxaparin  40 mg SubCUTAneous Daily     PRN Meds: white petrolatum, sodium chloride, acetaminophen **OR** acetaminophen, sodium chloride flush, sodium chloride, heparin flush, sodium chloride flush, sodium chloride, heparin flush, perflutren lipid microspheres, polyethylene glycol, ipratropium-albuterol      Intake/Output Summary (Last 24 hours) at 5/5/2022 1258  Last data filed at 5/5/2022 4910  Gross per 24 hour   Intake 1247.52 ml   Output 750 ml   Net 497.52 ml       Physical Exam Performed:    BP (!) 115/58   Pulse 74   Temp 97.1 °F (36.2 °C) (Temporal)   Resp 18   Ht 5' 2\" (1.575 m)   Wt 231 lb 3 oz (104.9 kg)   SpO2 98%   BMI 42.28 kg/m²     General appearance: No apparent distress, appears stated age. Lethargic. HEENT: Pupils equal, round, and reactive to light. Conjunctivae/corneas clear. Neck: Supple, with full range of motion. No jugular venous distention. Trachea midline. Respiratory:  Normal respiratory effort. Clear to auscultation, bilaterally without Rales/Wheezes/Rhonchi. Cardiovascular: Regular rate and rhythm with normal S1/S2 without murmurs, rubs or gallops. Abdomen: Soft, non-tender, non-distended with normal bowel sounds. Musculoskeletal: No clubbing, cyanosis or edema bilaterally. Full range of motion without deformity. Skin: Skin color, texture, turgor normal.  No rashes or lesions. Neurologic:  Moving ext  Psychiatric: Alert and oriented x 2      Labs:   Recent Labs     05/03/22  0611 05/03/22  0813 05/04/22  0540 05/04/22  0540 05/05/22  0100 05/05/22  0320 05/05/22  1000   WBC 9.5  --  5.4  --   --  5.9  --    HGB 7.2*   < > 6.7*   < > 8.0* 8.3* 8.1*   HCT 23.4*   < > 20.7*   < > 24.8* 25.7* 24.6*   PLT 47*  --  37*  --   --  29*  --     < > = values in this interval not displayed. Recent Labs     05/03/22  0611 05/03/22  0611 05/04/22  0540 05/05/22  0320     --  138 139   K 3.6  3.6   < > 4.8  4.8 4.8   *  --  109* 111*   CO2 22  --  23 20*   BUN 20  --  14 11   CREATININE 0.8  --  0.6 0.6   CALCIUM 7.2*  --  7.0* 6.8*   PHOS 1.8*  --   --   --     < > = values in this interval not displayed.      Recent Labs     05/03/22  6800 05/04/22  0540 05/05/22  0320   AST 7 8 10   ALT 5 6 6   BILITOT 0.3 0.2 0.4   ALKPHOS 91 83 79     Recent Labs     05/04/22  1755   INR 1.1     No results for input(s): CKTOTAL, TROPONINI in the last 72 hours. Urinalysis:      Lab Results   Component Value Date    NITRU POSITIVE 04/30/2022    WBCUA PACKED 04/30/2022    BACTERIA MANY 04/30/2022    RBCUA PACKED 04/30/2022    BLOODU LARGE 04/30/2022    SPECGRAV 1.010 04/30/2022    GLUCOSEU Negative 04/30/2022       Radiology:  CT HEAD WO CONTRAST   Final Result   No acute intracranial abnormality. Cortical atrophy and periventricular leukomalacia. XR CHEST (2 VW)   Final Result   Cardiomegaly without signs of congestion         CT Head WO Contrast   Final Result   No acute intracranial abnormality. There is age-appropriate atrophy and   small-vessel ischemic disease. CT chest.      There is borderline cardiomegaly. There is mediastinal lipomatosis. There   is mild ectasia of the ascending thoracic aorta measuring 3.7 x 3.5 cm. The   trachea and major bronchi are patent. Nonenlarged mediastinal lymph nodes   are present. There is mild peribronchial thickening concerning for   bronchitis with minimal atelectasis in the lung bases. There is no focal   consolidation or pleural effusion. Liver is of normal architecture. Bilateral adrenal nodules are present with larger 1 on the right side   measuring 2.3 x 3.9 cm degenerative changes are identified in the thoracic   spine. Impression      Mild cardiomegaly with bronchitis and atelectasis in the lung bases. There   is no focal consolidation or edema. Bilateral adrenal nodules. RECOMMENDATIONS:   Unavailable         CT CHEST WO CONTRAST   Final Result   No acute intracranial abnormality. There is age-appropriate atrophy and   small-vessel ischemic disease. CT chest.      There is borderline cardiomegaly. There is mediastinal lipomatosis.   There   is mild ectasia of the ascending thoracic aorta measuring 3.7 x 3.5 cm. The   trachea and major bronchi are patent. Nonenlarged mediastinal lymph nodes   are present. There is mild peribronchial thickening concerning for   bronchitis with minimal atelectasis in the lung bases. There is no focal   consolidation or pleural effusion. Liver is of normal architecture. Bilateral adrenal nodules are present with larger 1 on the right side   measuring 2.3 x 3.9 cm degenerative changes are identified in the thoracic   spine. Impression      Mild cardiomegaly with bronchitis and atelectasis in the lung bases. There   is no focal consolidation or edema. Bilateral adrenal nodules. RECOMMENDATIONS:   Unavailable         XR CHEST PORTABLE   Final Result   Rotational artifact with either artifactual prominence of the right hilum or   a right hilar mass. Recommend follow-up with two view chest radiograph or   chest CT to exclude a neoplastic process. Assessment/Plan:    Active Hospital Problems    Diagnosis     Acute metabolic encephalopathy [M65.87]      Priority: Medium     Hypotension-due to hypovolemia/sepsis  -BP improved but borderline. Continue IVF. -Critical care was consulted. Patient was given midodrine. Currently NPO thus not getting oral medications,SLP to assess. Recommend echocardiogram.Held lasix. Acute metabolic encephalopathy-likely secondary to urinary tract infection/bacteremia  Pt is improved today  -CT head non acute X 2  -Ammonia levels and ABG wnl  -Continue antibiotic for bacteremia    ESBL E. coli bacteremia  -ID consulted. Continue ivanz x 10days  -Urine culture shows mixed katie  -No pneumonia on CT chest    Hypokalemia  -Monitor and replace    False hyperglycemia  -Labs are being drawn from PICC line with dextrose solution. POCT glucose levels are within range.     Acute on Chronic anemia-suspect bone marrow suppression.  -Hgb <7.0,Transfuse PRBC. Consent to be obtained from family. Decrease frequency of blood draws.  -Hold Plavix, Lovenox  -FU FOBT    Thrombocytopenia-likely due to bacteremia  -Platelets continue trending down. Transfuse as needed. Hold Lovenox. Consulted Oncology. Suspect infection and inflammatiion.     Other medical issues.   CHF  HLD  History of chronic back pain and impaired mobility  Hx of polio  Hx of endometrial ca  Hx of uti in march 2022  Hx of intara abdomin infection  Dec 2021    DVT Prophylaxis: SCDs due to anemia  Diet: Diet NPO  Code Status: Limited    PT/OT Eval Status: as needed    Dispo - SNF in [address] [place] [address], MD

## 2022-05-05 NOTE — PROGRESS NOTES
Department of Internal Medicine  Infectious Diseases  Progress  Note      C/C : E coli bacteremia, sepsis     Pt is awake and alert   Denies fever or chills  Denies pain   No distress  Afebrile       Current Facility-Administered Medications   Medication Dose Route Frequency Provider Last Rate Last Admin    white petrolatum ointment   Topical BID Miki Dodge MD        white petrolatum ointment   Topical TID PRN Miki Dodge MD        0.9 % sodium chloride infusion   IntraVENous PRN Tommy Nova DO        midodrine (PROAMATINE) tablet 5 mg  5 mg Oral TID  Miki Dodge MD        acetaminophen (TYLENOL) tablet 650 mg  650 mg Oral Q6H PRN Devendra Morel MD        Or    acetaminophen (TYLENOL) suppository 650 mg  650 mg Rectal Q6H PRN Devendra Morel MD        sodium chloride flush 0.9 % injection 5-40 mL  5-40 mL IntraVENous 2 times per day Dolly Maynard MD   10 mL at 05/05/22 0919    sodium chloride flush 0.9 % injection 5-40 mL  5-40 mL IntraVENous PRN Kvng Perez MD        0.9 % sodium chloride infusion   IntraVENous PRN Kvng Perez MD        heparin flush 100 UNIT/ML injection 100 Units  1 mL IntraVENous 2 times per day Dolly Maynard MD   100 Units at 05/05/22 0918    heparin flush 100 UNIT/ML injection 100 Units  1 mL IntraCATHeter PRN Dolly Maynard MD        dextrose 5 % and 0.45 % NaCl with KCl 20 mEq infusion   IntraVENous Continuous Tashxavier Martines MD 75 mL/hr at 05/05/22 1319 New Bag at 05/05/22 1319    sodium chloride flush 0.9 % injection 5-40 mL  5-40 mL IntraVENous 2 times per day Dionisio Keyes MD   10 mL at 05/05/22 0918    sodium chloride flush 0.9 % injection 5-40 mL  5-40 mL IntraVENous PRN Jayme Luong MD        0.9 % sodium chloride infusion   IntraVENous PRN Jayme Luong MD        heparin flush 100 UNIT/ML injection 300 Units  3 mL IntraVENous 2 times per day Dionisio Keyes MD   300 Units at 05/04/22 2205    heparin flush 100 UNIT/ML injection 300 Units  3 mL IntraCATHeter PRN Jayme Luong MD        perflutren lipid microspheres (DEFINITY) injection 1.65 mg  1.5 mL IntraVENous ONCE PRN Samuel Coats,         folic acid injection 1 mg  1 mg IntraVENous Daily Ash Sandoval MD   1 mg at 05/05/22 0918    pantoprazole (PROTONIX) injection 40 mg  40 mg IntraVENous BID Delfina Dueñas MD   40 mg at 05/05/22 0918    ertapenem (INVanz) 1000 mg IVPB minibag  1,000 mg IntraVENous Q24H Clarence Yarbrough MD   Stopped at 05/05/22 1319    [Held by provider] clopidogrel (PLAVIX) tablet 75 mg  75 mg Oral Daily Gail Cardoza MD   75 mg at 05/01/22 0838    vitamin D (ERGOCALCIFEROL) capsule 50,000 Units  50,000 Units Oral Once per day on Thu Gail Cardoza MD        [Held by provider] ferrous sulfate (IRON 325) tablet 325 mg  325 mg Oral Every Other Day Gail Cardoza MD   325 mg at 04/30/22 2126    fluticasone (FLONASE) 50 MCG/ACT nasal spray 2 spray  2 spray Each Nostril Daily Gail Cardoza MD   2 spray at 05/05/22 0918    [Held by provider] furosemide (LASIX) tablet 40 mg  40 mg Oral Daily Gail Cardoza MD   40 mg at 05/01/22 0837    [Held by provider] magnesium oxide (MAG-OX) tablet 400 mg  400 mg Oral Daily Gail Cardoza MD   400 mg at 05/01/22 0838    [Held by provider] oxybutynin (DITROPAN-XL) extended release tablet 10 mg  10 mg Oral Daily Gail Cardoza MD   10 mg at 05/01/22 0838    [Held by provider] potassium bicarb-citric acid (EFFER-K) effervescent tablet 20 mEq  20 mEq Oral Daily Gail Cardoza MD   20 mEq at 05/01/22 0838    sertraline (ZOLOFT) tablet 50 mg  50 mg Oral Daily Maximus Littlejohn MD   50 mg at 05/01/22 0838    atorvastatin (LIPITOR) tablet 10 mg  10 mg Oral Daily Gail Cardoza MD   10 mg at 05/01/22 0837    [Held by provider] trospium (SANCTURA) tablet 20 mg  20 mg Oral Nightly Gail Cardoza MD   20 mg at 04/30/22 2126    [Held by provider] enoxaparin (LOVENOX) injection 40 mg  40 mg SubCUTAneous Daily Vianey Vasquez MD   40 mg at 05/02/22 0917    polyethylene glycol (GLYCOLAX) packet 17 g  17 g Oral Daily PRN Vianey Vasquez MD        ipratropium-albuterol (DUONEB) nebulizer solution 1 ampule  1 ampule Inhalation Q4H PRN Vianey Vasquez MD            REVIEW OF SYSTEMS:       CONSTITUTIONAL: Denies fever or chills   HEENT: Denies sore throat    RESPIRATORY: denies cough, shortness of breath, sputum expectoration  CARDIOVASCULAR:  Denies palpitation or chest pain   GASTROINTESTINAL:  Denies abdomen pain, diarrhea or constipation. GENITOURINARY:  Denies burning urination or frequency of urination  INTEGUMENT: denies wound , rash  HEMATOLOGIC/LYMPHATIC:  Denies lymph node swelling, gum bleeding or easy bruising. MUSCULOSKELETAL:  Denies leg pain , joint pain , joint swelling  NEUROLOGICAL:  Awake and alert         PHYSICAL EXAM:      Vitals:     Vitals:    05/05/22 0925   BP: (!) 115/58   Pulse: 74   Resp: 18   Temp: 97.1 °F (36.2 °C)   SpO2: 98%       General Appearance:    Awake and alert . Head:    Normocephalic, atraumatic   Eyes:    No pallor, no icterus,   Ears:    No obvious deformity or drainage.    Nose:   No nasal drainage   Throat:   Mucosa moist, no oral thrush   Neck:   Supple, no lymphadenopathy   Back:     no CVA tenderness   Lungs:     Clear to auscultation bilaterally, no wheeze    Heart:    Regular rate and rhythm, no murmur   Abdomen:     Soft, non-tender, bowel sounds present    Extremities:   +  edema, no cyanosis    Pulses:   Dorsalis pedis palpable    Skin:   no rashes       CBC with Differential:      Lab Results   Component Value Date    WBC 5.9 05/05/2022    RBC 2.90 05/05/2022    HGB 8.1 05/05/2022    HCT 24.6 05/05/2022    PLT 29 05/05/2022    MCV 88.6 05/05/2022    MCH 28.6 05/05/2022    MCHC 32.3 05/05/2022    RDW 16.6 05/05/2022    NRBC 0.9 05/05/2022    METASPCT 0.9 05/01/2022    LYMPHOPCT 27.2 05/05/2022    MONOPCT 0.9 05/05/2022    MYELOPCT 2.6 05/05/2022    BASOPCT 0.9 05/05/2022    MONOSABS 0.06 05/05/2022    LYMPHSABS 1.59 05/05/2022    EOSABS 0.05 05/05/2022    BASOSABS 0.05 05/05/2022       CMP     Lab Results   Component Value Date     05/05/2022    K 4.8 05/05/2022     05/05/2022    CO2 20 05/05/2022    BUN 11 05/05/2022    CREATININE 0.6 05/05/2022    GFRAA >60 05/05/2022    LABGLOM >60 05/05/2022    GLUCOSE 386 05/05/2022    PROT 4.2 05/05/2022    LABALBU 1.6 05/05/2022    CALCIUM 6.8 05/05/2022    BILITOT 0.4 05/05/2022    ALKPHOS 79 05/05/2022    AST 10 05/05/2022    ALT 6 05/05/2022         Hepatic Function Panel:    Lab Results   Component Value Date    ALKPHOS 79 05/05/2022    ALT 6 05/05/2022    AST 10 05/05/2022    PROT 4.2 05/05/2022    BILITOT 0.4 05/05/2022    LABALBU 1.6 05/05/2022       PT/INR:    Lab Results   Component Value Date    PROTIME 11.6 05/04/2022    INR 1.1 05/04/2022       TSH:    Lab Results   Component Value Date    TSH 2.860 05/02/2022       U/A:    Lab Results   Component Value Date    COLORU OTHER 04/30/2022    PHUR 8.5 04/30/2022    LABCAST FEW 02/07/2015    WBCUA PACKED 04/30/2022    RBCUA PACKED 04/30/2022    BACTERIA MANY 04/30/2022    CLARITYU TURBID 04/30/2022    SPECGRAV 1.010 04/30/2022    LEUKOCYTESUR LARGE 04/30/2022    UROBILINOGEN 1.0 04/30/2022    BILIRUBINUR LARGE 04/30/2022    BLOODU LARGE 04/30/2022    GLUCOSEU Negative 04/30/2022    AMORPHOUS PRESENT 03/27/2022       ABG:  No results found for: FVJ1ZSM, BEART, W3DXLJRG, PHART, THGBART, XUS1VJZ, PO2ART, NKP2VTZ    MICROBIOLOGY:    Blood culture -  Susceptibility      Escherichia coli (1)    Antibiotic Interpretation Microscan  Method Status    amoxicillin-clavulanate Intermediate ^16 mcg/mL BACTERIAL SUSCEPTIBILITY PANEL BY SADA     ceFAZolin Resistant >=^64 mcg/mL BACTERIAL SUSCEPTIBILITY PANEL BY SADA     cefepime Resistant ^16 mcg/mL BACTERIAL SUSCEPTIBILITY PANEL BY SADA     cefotaxime Resistant >=^64 mcg/mL BACTERIAL SUSCEPTIBILITY PANEL BY SADA     cefOXitin Intermediate ^16 mcg/mL BACTERIAL SUSCEPTIBILITY PANEL BY SADA     cefTAZidime-avibactam Sensitive ^0.5 mcg/mL BACTERIAL SUSCEPTIBILITY PANEL BY SADA     gentamicin Sensitive <=^1 mcg/mL BACTERIAL SUSCEPTIBILITY PANEL BY SADA     levofloxacin Sensitive ^1 mcg/mL BACTERIAL SUSCEPTIBILITY PANEL BY SADA     meropenem Sensitive <=^0.25 mcg/mL BACTERIAL SUSCEPTIBILITY PANEL BY SADA     piperacillin-tazobactam Sensitive <=^4 mcg/mL BACTERIAL SUSCEPTIBILITY PANEL BY SADA     trimethoprim-sulfamethoxazole Sensitive <=^20 mcg/mL BACTERIAL SUSCEPTIBILITY PANEL BY SADA       Providencia stuartii (2)    Antibiotic Interpretation Microscan  Method Status    ceFAZolin Resistant >=^64 mcg/mL BACTERIAL SUSCEPTIBILITY PANEL BY SADA     cefepime Sensitive <=^0.12 mcg/mL BACTERIAL SUSCEPTIBILITY PANEL BY SADA     cefotaxime Sensitive <=^0.25 mcg/mL BACTERIAL SUSCEPTIBILITY PANEL BY SADA     cefOXitin Sensitive <=^4 mcg/mL BACTERIAL SUSCEPTIBILITY PANEL BY SADA     cefTAZidime-avibactam Sensitive ^0. 25 mcg/mL BACTERIAL SUSCEPTIBILITY PANEL BY SADA     gentamicin Resistant <=^1 mcg/mL BACTERIAL SUSCEPTIBILITY PANEL BY SADA     levofloxacin Intermediate ^4 mcg/mL BACTERIAL SUSCEPTIBILITY PANEL BY SADA     meropenem Sensitive <=^0.25 mcg/mL BACTERIAL SUSCEPTIBILITY PANEL BY SADA     piperacillin-tazobactam Sensitive <=^4 mcg/mL BACTERIAL SUSCEPTIBILITY PANEL BY SADA     trimethoprim-sulfamethoxazole Sensitive <=^20 mcg/mL BACTERIAL SUSCEPTIBILITY PANEL BY SADA         Urine Culture -    10 to 100,000 CFU/mL   Mixed katie isolated. Further workup and sensitivity testing   is not routinely indicated and will not be performed. Mixed katie isolated includes:   Mixed gram negative rods   Proteus species   Nonhemolytic Strep species    Narrative:           Radiology :    Chest X ray : no infiltrates       IMPRESSION:     1 ESBL ( CTX M ) E coli , Providencia bacteremia, sepsis   2. CAUTI ( Cobos catheter was changed )   3.  Leukocytosis - improved RECOMMENDATIONS:      1. IV invanz 1 gram q 24 hrs till 5/15/22   2.  Contact isolation

## 2022-05-05 NOTE — PLAN OF CARE
Problem: Discharge Planning  Goal: Discharge to home or other facility with appropriate resources  Outcome: Progressing     Problem: Skin/Tissue Integrity  Goal: Absence of new skin breakdown  Description: 1. Monitor for areas of redness and/or skin breakdown  2. Assess vascular access sites hourly  3. Every 4-6 hours minimum:  Change oxygen saturation probe site  4. Every 4-6 hours:  If on nasal continuous positive airway pressure, respiratory therapy assess nares and determine need for appliance change or resting period.   Outcome: Progressing     Problem: Safety - Adult  Goal: Free from fall injury  Outcome: Progressing     Problem: ABCDS Injury Assessment  Goal: Absence of physical injury  Outcome: Progressing     Problem: Chronic Conditions and Co-morbidities  Goal: Patient's chronic conditions and co-morbidity symptoms are monitored and maintained or improved  Outcome: Progressing     Problem: Pain  Goal: Verbalizes/displays adequate comfort level or baseline comfort level  Outcome: Progressing     Problem: Skin/Tissue Integrity - Adult  Goal: Skin integrity remains intact  Outcome: Progressing  Goal: Incisions, wounds, or drain sites healing without S/S of infection  Outcome: Progressing  Goal: Oral mucous membranes remain intact  Outcome: Progressing     Problem: Genitourinary - Adult  Goal: Urinary catheter remains patent  Outcome: Progressing     Problem: Metabolic/Fluid and Electrolytes - Adult  Goal: Electrolytes maintained within normal limits  Outcome: Progressing  Goal: Hemodynamic stability and optimal renal function maintained  Outcome: Progressing  Goal: Glucose maintained within prescribed range  Outcome: Progressing     Problem: Hematologic - Adult  Goal: Maintains hematologic stability  Outcome: Progressing

## 2022-05-05 NOTE — CARE COORDINATION
Patient in contact for ESBL in her urine. On IV Invanz x10 days ordered 5/1. PICC placed 5/2. Chronic lizama changed 5/1. Patient does need a Video swallow as she failed a bedside. Patient admitted from [place] [address] at the [facility] and is a bed hold. No precert needed for return, just updated therapy and a COVID test the day of discharge. Envelope and transportation form in soft chart for transition of care planning. Electronically signed by Néstor Alvarez RN on 5/5/2022 at 3:02 PM

## 2022-05-06 NOTE — PLAN OF CARE
Problem: Skin/Tissue Integrity  Goal: Absence of new skin breakdown  Description: 1. Monitor for areas of redness and/or skin breakdown  2. Assess vascular access sites hourly  3. Every 4-6 hours minimum:  Change oxygen saturation probe site  4. Every 4-6 hours:  If on nasal continuous positive airway pressure, respiratory therapy assess nares and determine need for appliance change or resting period.   5/6/2022 0445 by Gabriela Bernardo RN  Outcome: Progressing  5/5/2022 1614 by Mary Feliciano RN  Outcome: Progressing     Problem: Safety - Adult  Goal: Free from fall injury  5/6/2022 0445 by Gabriela Bernardo RN  Outcome: Progressing  5/5/2022 1614 by Mary Feliciano RN  Outcome: Progressing     Problem: ABCDS Injury Assessment  Goal: Absence of physical injury  5/6/2022 0445 by Gabriela Bernardo RN  Outcome: Progressing  5/5/2022 1614 by Mary Feliciano RN  Outcome: Progressing     Problem: Chronic Conditions and Co-morbidities  Goal: Patient's chronic conditions and co-morbidity symptoms are monitored and maintained or improved  5/6/2022 0445 by Gabriela Bernardo RN  Outcome: Progressing  5/5/2022 1614 by Mary Feliciano RN  Outcome: Progressing     Problem: Pain  Goal: Verbalizes/displays adequate comfort level or baseline comfort level  5/6/2022 0445 by Gabriela Bernardo RN  Outcome: Progressing  5/5/2022 1614 by Mary Feliciano RN  Outcome: Progressing     Problem: Neurosensory - Adult  Goal: Achieves stable or improved neurological status  5/6/2022 0445 by Gabriela Bernardo RN  Outcome: Progressing  5/5/2022 1614 by Mary Feliciano RN  Outcome: Progressing     Problem: Cardiovascular - Adult  Goal: Maintains optimal cardiac output and hemodynamic stability  5/6/2022 0445 by Gabriela Bernardo RN  Outcome: Progressing  5/5/2022 1614 by Mary Feliciano RN  Outcome: Progressing  Goal: Absence of cardiac dysrhythmias or at baseline  5/6/2022 0445 by Gabriela Bernardo RN  Outcome: Progressing  5/5/2022 1614 by Mary Feliciano RN  Outcome: Progressing

## 2022-05-06 NOTE — PROGRESS NOTES
Hospitalist Progress Note      PCP: Srinath Hamilton MD    Date of Admission: 4/30/2022    Hospital Course:   \" 59-year-old nursing home resident with chronic lizama ,presents with altered mental status. EMS reports nursing home states she was unresponsive except to pain and was disoriented. Patient was found to have inner tract infection in the ED and she was started on Rocephin. complicated by hypotension requiring ICU stay. \"       Subjective:    No complaints. She is having echo.     Medications:  Reviewed    Infusion Medications    sodium chloride      sodium chloride      dextrose 5% and 0.45% NaCl with KCl 20 mEq 75 mL/hr at 05/06/22 0247    sodium chloride       Scheduled Medications    white petrolatum   Topical BID    midodrine  5 mg Oral TID WC    sodium chloride flush  5-40 mL IntraVENous 2 times per day    heparin flush  1 mL IntraVENous 2 times per day    sodium chloride flush  5-40 mL IntraVENous 2 times per day    heparin flush  3 mL IntraVENous 2 times per day    folic acid  1 mg IntraVENous Daily    pantoprazole  40 mg IntraVENous BID    ertapenem (INVanz) IVPB  1,000 mg IntraVENous Q24H    [Held by provider] clopidogrel  75 mg Oral Daily    vitamin D  50,000 Units Oral Once per day on Thu    [Held by provider] ferrous sulfate  325 mg Oral Every Other Day    fluticasone  2 spray Each Nostril Daily    [Held by provider] furosemide  40 mg Oral Daily    [Held by provider] magnesium oxide  400 mg Oral Daily    [Held by provider] oxybutynin  10 mg Oral Daily    [Held by provider] potassium bicarb-citric acid  20 mEq Oral Daily    sertraline  50 mg Oral Daily    atorvastatin  10 mg Oral Daily    [Held by provider] trospium  20 mg Oral Nightly    [Held by provider] enoxaparin  40 mg SubCUTAneous Daily     PRN Meds: white petrolatum, sodium chloride, acetaminophen **OR** acetaminophen, sodium chloride flush, sodium chloride, heparin flush, sodium chloride flush, sodium chloride, heparin flush, perflutren lipid microspheres, polyethylene glycol, ipratropium-albuterol      Intake/Output Summary (Last 24 hours) at 5/6/2022 1412  Last data filed at 5/5/2022 2249  Gross per 24 hour   Intake --   Output 500 ml   Net -500 ml       Physical Exam Performed:    /64   Pulse 79   Temp 97.6 °F (36.4 °C) (Temporal)   Resp 18   Ht 5' 2\" (1.575 m)   Wt 231 lb 1.1 oz (104.8 kg)   SpO2 98%   BMI 42.26 kg/m²     General appearance: No apparent distress, appears stated age. Lethargic. HEENT: Pupils equal, round, and reactive to light. Conjunctivae/corneas clear. Neck: Supple, with full range of motion. No jugular venous distention. Trachea midline. Respiratory:  Normal respiratory effort. Clear to auscultation, bilaterally without Rales/Wheezes/Rhonchi. Cardiovascular: Regular rate and rhythm with normal S1/S2 without murmurs, rubs or gallops. Abdomen: Soft, non-tender, non-distended with normal bowel sounds. Musculoskeletal: No clubbing, cyanosis or edema bilaterally. Full range of motion without deformity. Skin: Skin color, texture, turgor normal.  No rashes or lesions. Neurologic:  Moving ext  Psychiatric: Alert and oriented x 2      Labs:   Recent Labs     05/04/22  0540 05/05/22  0100 05/05/22  0320 05/05/22  0320 05/05/22  1000 05/05/22  1845 05/06/22  0430   WBC 5.4  --  5.9  --   --   --  7.4   HGB 6.7*   < > 8.3*   < > 8.1* 8.3* 8.3*   HCT 20.7*   < > 25.7*   < > 24.6* 25.0* 26.4*   PLT 37*  --  29*  --   --   --  29*    < > = values in this interval not displayed.      Recent Labs     05/04/22  0540 05/05/22  0320 05/06/22  0430    139 142   K 4.8  4.8 4.8 3.6   * 111* 111*   CO2 23 20* 22   BUN 14 11 9   CREATININE 0.6 0.6 0.5   CALCIUM 7.0* 6.8* 7.0*     Recent Labs     05/04/22  0540 05/05/22  0320 05/06/22  0430   AST 8 10 13   ALT 6 6 6   BILITOT 0.2 0.4 0.4   ALKPHOS 83 79 84     Recent Labs     05/04/22  1755   INR 1.1     No results for input(s): CKTOTAL, TROPONINI in the last 72 hours. Urinalysis:      Lab Results   Component Value Date    NITRU POSITIVE 04/30/2022    WBCUA PACKED 04/30/2022    BACTERIA MANY 04/30/2022    RBCUA PACKED 04/30/2022    BLOODU LARGE 04/30/2022    SPECGRAV 1.010 04/30/2022    GLUCOSEU Negative 04/30/2022       Radiology:  CT HEAD WO CONTRAST   Final Result   No acute intracranial abnormality. Cortical atrophy and periventricular leukomalacia. XR CHEST (2 VW)   Final Result   Cardiomegaly without signs of congestion         CT Head WO Contrast   Final Result   No acute intracranial abnormality. There is age-appropriate atrophy and   small-vessel ischemic disease. CT chest.      There is borderline cardiomegaly. There is mediastinal lipomatosis. There   is mild ectasia of the ascending thoracic aorta measuring 3.7 x 3.5 cm. The   trachea and major bronchi are patent. Nonenlarged mediastinal lymph nodes   are present. There is mild peribronchial thickening concerning for   bronchitis with minimal atelectasis in the lung bases. There is no focal   consolidation or pleural effusion. Liver is of normal architecture. Bilateral adrenal nodules are present with larger 1 on the right side   measuring 2.3 x 3.9 cm degenerative changes are identified in the thoracic   spine. Impression      Mild cardiomegaly with bronchitis and atelectasis in the lung bases. There   is no focal consolidation or edema. Bilateral adrenal nodules. RECOMMENDATIONS:   Unavailable         CT CHEST WO CONTRAST   Final Result   No acute intracranial abnormality. There is age-appropriate atrophy and   small-vessel ischemic disease. CT chest.      There is borderline cardiomegaly. There is mediastinal lipomatosis. There   is mild ectasia of the ascending thoracic aorta measuring 3.7 x 3.5 cm. The   trachea and major bronchi are patent. Nonenlarged mediastinal lymph nodes   are present.   There is mild peribronchial thickening concerning for   bronchitis with minimal atelectasis in the lung bases. There is no focal   consolidation or pleural effusion. Liver is of normal architecture. Bilateral adrenal nodules are present with larger 1 on the right side   measuring 2.3 x 3.9 cm degenerative changes are identified in the thoracic   spine. Impression      Mild cardiomegaly with bronchitis and atelectasis in the lung bases. There   is no focal consolidation or edema. Bilateral adrenal nodules. RECOMMENDATIONS:   Unavailable         XR CHEST PORTABLE   Final Result   Rotational artifact with either artifactual prominence of the right hilum or   a right hilar mass. Recommend follow-up with two view chest radiograph or   chest CT to exclude a neoplastic process. FL MODIFIED BARIUM SWALLOW W VIDEO    (Results Pending)           Assessment/Plan:    Active Hospital Problems    Diagnosis     Acute metabolic encephalopathy [D69.85]      Priority: Medium     Hypotension-due to hypovolemia/sepsis  -BP improved but borderline. Continue IVF. -Critical care was consulted. Patient was given midodrine. Currently NPO thus not getting oral medications,SLP to assess. Recommend echocardiogram.Held lasix. Acute metabolic encephalopathy-likely secondary to urinary tract infection/bacteremia  Pt is improved  -CT head non acute X 2  -Ammonia levels and ABG wnl  -Continue antibiotic for bacteremia    ESBL E. coli bacteremia  -ID consulted. Continue ivanz x 10days  -Urine culture shows mixed katie  -No pneumonia on CT chest    FEN  -SLP consulted. Await video swallow. Hypokalemia  -Monitor and replace    False hyperglycemia  -Labs are being drawn from PICC line with dextrose solution. POCT glucose levels are within range.     Acute on Chronic anemia-suspect bone marrow suppression.  -Hgb <7.0,Transfuse PRBC. Consent to be obtained from family. Decrease frequency of blood draws.  -Hold Plavix, Lovenox  -FU FOBT  -Folic acid low,started on folic acidosis    Thrombocytopenia-likely due to bacteremia  -Platelets continue trending down. Transfuse as needed. Hold Lovenox. Consulted Oncology. Suspect infection and inflammatiion.     Other medical issues.   CHF  HLD  History of chronic back pain and impaired mobility  Hx of polio  Hx of endometrial ca  Hx of uti in march 2022  Hx of intara abdomin infection  Dec 2021    DVT Prophylaxis: SCDs due to anemia  Diet: Diet NPO  Code Status: Limited    PT/OT Eval Status: as needed    Dispo - SNF in [address] [place] [address], MD

## 2022-05-06 NOTE — PROGRESS NOTES
Inpatient Hematology/Oncology Progress Note    Subjective: Answers yes and no questions. Confused. Afebrile. Objective:  /64   Pulse 79   Temp 97.6 °F (36.4 °C) (Temporal)   Resp 18   Ht 5' 2\" (1.575 m)   Wt 231 lb 1.1 oz (104.8 kg)   SpO2 98%   BMI 42.26 kg/m²   GENERAL: Alert. Answers yes and no questions. Confused. HEENT: PERRLA; EOMI. Oropharynx clear. NECK: Supple. Without lymphadenopathy. LUNGS: Decreased air entry bilaterally. CARDIOVASCULAR: Regular rate. No murmurs, rubs or gallops. ABDOMEN: Soft. Non-tender, non-distended. EXTREMITIES: Extensive bruising to BUE. NEUROLOGIC: No focal deficits. Diagnostics  Lab Results   Component Value Date    WBC 7.4 05/06/2022    HGB 8.3 (L) 05/06/2022    HCT 26.4 (L) 05/06/2022    MCV 88.9 05/06/2022    PLT 29 (L) 05/06/2022     Lab Results   Component Value Date     05/06/2022    K 3.6 05/06/2022     (H) 05/06/2022    CO2 22 05/06/2022    BUN 9 05/06/2022    CREATININE 0.5 05/06/2022    GLUCOSE 138 (H) 05/06/2022    CALCIUM 7.0 (L) 05/06/2022    PROT 4.5 (L) 05/06/2022    LABALBU 1.8 (L) 05/06/2022    BILITOT 0.4 05/06/2022    ALKPHOS 84 05/06/2022    AST 13 05/06/2022    ALT 6 05/06/2022    LABGLOM >60 05/06/2022    GFRAA >60 05/06/2022     Impression/Plan:  59-year-old female patient with past medical history significant for hypertension, CKD, hyperlipidemia, CHF, TIA, anemia, and depression admitted 4/30/2022 with altered mental status from her nursing facility. Upon admission, WBC 19.5, hemoglobin 10.6, hematocrit 33.2, platelets 089, sodium 145, potassium 3.3, BUN 23, creatinine 0.8, calcium 8.4, lactic acid 1.9. Urinalysis revealed positive nitrites, large amount of leukocyte esterase positive WBC, positive RBC indicative of UTI. Urine culture from 3/28/2022 positive E. Coli. Blood culture x1 on 4/30/2022 positive E. Coli. ID is following.      On 5/1/2022 patient developed hypotension prompting a transfer to the ICU. NICOM was obtained and indicated patient was fluid responsive. She received a total of 1.5 L bolus and blood pressure improved. She was also noted to have a drop in hemoglobin and developed worsening thrombocytopenia. On 5/1/2022 her hemoglobin dropped from 11.3-8.4. Hemoglobin then dropped to 6.9 on 5/2/2022 requiring transfusion. Prior to her transfusion iron studies were obtained. Iron 82, ferritin 1482, iron saturation cannot be calculated due to unsaturated iron binding capacity result being below analytical measuring range. Vitamin B12 965, folate less than 2. She has been started on intravenous folic acid. , Peripheral blood smear revealing thrombocytopenia which may be related to decreased production or increased destruction. Normocytic anemia. Leukocytosis with neutrophilia and absolute lymphocytes, monocytes, eosinophils are decreased. Blood work done prior to admission on 3/27/2022 revealed a normal hemoglobin of 13.7, hematocrit 44, and mild thrombocytopenia with platelets 506. We have been asked to see her regarding anemia and thrombocytopenia in the setting of sepsis. New onset anemia and thrombocytopenia likely secondary to sepsis and inflammation. ID following. On Thea Bang till 05/15/2022. Contact isolation  Peripheral blood smear revealing thrombocytopenia which may be related to decreased production or increased destruction. Normocytic anemia. Leukocytosis with neutrophilia and absolute lymphocytes, monocytes, eosinophils are decreased. Anemia/Thrombocytopenia workup ordered. Carlito negative  Haptoglobin 242  RF 11 (0-13)  HIV Non-Reactive  Acute hepatitis panel Non-Reactive  Folate deficient, continue Folic acid. Monitor CBC with diff. Tranfuse to keep Hgb >7, PLT >10.    Will continue to follow      Suzie Travis, [address] [facility] Oncology    The patient was seen and examined, I agree with OSMIN Maier's findings, assessment and plan as outlined.      Daniel Murphy MD   HEMATOLOGY/MEDICAL [address] [facility]  [address] MED ONCOLOGY  [facility] [address]  Dept: 71 Matilda Guerra: [phone]

## 2022-05-06 NOTE — CARE COORDINATION
Patient is for a barium swallow today. Continues on IV Invanz via Picc line until 5/15/2022. Platelets 29 and hgb 8.3. Spoke with Migdalia from [facility] at the [facility] and PT/OT notes are sufficient, no other updates needed. Will need covid day of discharge. Ambulance form in envelope in soft chart.

## 2022-05-06 NOTE — PROGRESS NOTES
SPEECH/LANGUAGE PATHOLOGY  VIDEOFLUOROSCOPIC STUDY OF SWALLOWING (MBS)   and PLAN OF CARE    PATIENT NAME:  Priyanka Concepcion  (female)     MRN:  [id number]    :  1955  (79 y.o.)  STATUS:  Inpatient: Room 8512/8512-A    TODAY'S DATE:  2022  REFERRING PROVIDER:   Radha John MS   SPECIFIC PROVIDER ORDER: FL modified barium swallow with video  Date of order:  22   REASON FOR REFERRAL: assess oropharyngeal swallow    EVALUATING THERAPIST: VERÓNICA Alexandra      RESULTS:      DYSPHAGIA DIAGNOSIS:  limited intake on exam, not able to determine type or severity of dysphagia    Pt cognitive ability negatively impacting completion of exam. Unable to complete A-P bolus movement, thus resulting in anterior loss of bolus from mouth and not able to move to pharynx for function swallow. Recommend NPO with alternate means of nutrition at this time until Pt's cognitive ability/ alertness improves for functional participation. DIET RECOMMENDATIONS:  NPO including medications to be given via alternate method      .       FEEDING RECOMMENDATIONS:    Assistance level:  Not applicable     Compensatory strategies recommended: Not applicable     Discussed recommendations with nursing and/or faxed report to referring provider: Yes    Laryngeal Penetration and Aspiration:  Neither penetration nor aspiration was observed in today's study     SPEECH THERAPY  PLAN OF CARE   The dysphagia POC is established based on physician order and dysphagia diagnosis    Dysphagia therapy is not recommended       Conditions Requiring Skilled Therapeutic Intervention for dysphagia:    Not applicable    SPECIFIC DYSPHAGIA INTERVENTIONS TO INCLUDE:     not applicable    Specific instructions for next treatment:  not applicable   Treatment Goals:    Short Term Goals:  Not applicable no therapy warranted     Long Term Goals:   Not applicable no therapy warranted      Patient/family Goal:    not applicable    Plan of care discussed with Patient The Patient did not demonstrate complete understanding of the diagnosis, prognosis and plan of care     Rehabilitation Potential/Prognosis: fair                      ADMITTING DIAGNOSIS: Complicated UTI (urinary tract infection) [N39.0]  Nursing home resident [O52.8]  Acute metabolic encephalopathy [K00.95]     VISIT DIAGNOSIS:   Visit Diagnoses       Codes    Complicated UTI (urinary tract infection)     N39.0    Nursing home resident     Z59.3              PATIENT REPORT/COMPLAINT: patient not able to accurately report    PRIOR LEVEL OF SWALLOW FUNCTION:    Past History of Dysphagia?:  none reported    Home diet: Regular consistency solids (IDDSI level 7) with  thin liquids (IDDSI level 0)  Current Diet Order:  Diet NPO    PROCEDURE:  Consistencies Administered During the Evaluation   Liquids: nectar thick liquid   Solids:  not appropriate      Method of Intake:   coated spoon  Fed by clinician      Position:   Seated, upright    INSTRUMENTAL ASSESSMENT:    ORAL PREP/ ORAL PHASE:   Reduced oral acceptance from coated spoon  Inadequate labial seal resulting anterior labial spillage from midline  Impaired oral initiation  Absent A-P transit due to: cognitive function      PHARYNGEAL PHASE:     ONSET TIME       An absent pharyngeal swallow was noted        PHARYNGEAL RESIDUALS        Vallecula/Pharyngeal Wall           Unable to assess      Pyriform Sinuses      Unable to assess     LARYNGEAL PENETRATION   Unable to assess    ASPIRATION    Unable to assess       COMPENSATORY STRATEGIES    Compensatory strategies were not attempted      STRUCTURAL/FUNCTIONAL ANOMALIES   No structural/functional anomalies were noted    CERVICAL ESOPHAGEAL STAGE :     Was not assessed          ___________    Cognition:   Did not follow commands, Attention impaired,  and Non-verbal during this assessment.      Oral Peripheral Examination   Could not test    Current Respiratory Status   room air     Parameters of Speech Production  Nonverbal during exam     Pain: No pain reported. EDUCATION:   The Speech Language Pathologist (SLP) completed education regarding results of evaluation and that intervention is not warranted at this time. Learner: Patient  Education: Reviewed results and recommendations of this evaluation  Evaluation of Education:  No evidence of learning    This plan may be re-evaluated and revised as warranted. Evaluation Time includes thorough review of current medical information, gathering information on past medical history/social history and prior level of function, completion of standardized testing/informal observation of tasks, assessment of data and education on plan of care and goals. [x]The admitting diagnosis and active problem list, have been reviewed prior to initiation of this evaluation.     CPT Code: 20379  dysphagia study    ACTIVE PROBLEM LIST:   Patient Active Problem List   Diagnosis    Hypertension    Depression    Arthritis    H/O vaginal bleeding    Dermatitis    Anxiety    Hyperlipidemia    Vitamin B12 deficiency    Vitamin D deficiency    Abnormal EKG    Enlarged heart    Postmenopausal bleeding    Obesity    Generalized weakness    Tibial fracture    SIRS (systemic inflammatory response syndrome) (HCC)    Polio    [address]AWNA (acute kidney injury) ([address]    Sepsis due to urinary tract infection ([address]    Acute metabolic encephalopathy       Papito Walls CCC-SLP  Speech-Language Pathologist  [id number]  5/6/2022

## 2022-05-06 NOTE — PROGRESS NOTES
Department of Internal Medicine  Infectious Diseases  Progress  Note      C/C : E coli bacteremia, sepsis     Pt is awake and alert   Denies fever or chills  Denies pain   No distress  Afebrile       Current Facility-Administered Medications   Medication Dose Route Frequency Provider Last Rate Last Admin    white petrolatum ointment   Topical BID Martha Diaz MD   Given at 05/06/22 1140    white petrolatum ointment   Topical TID PRN Martha Diaz MD        0.9 % sodium chloride infusion   IntraVENous PRN Berdie Class, DO        midodrine (PROAMATINE) tablet 5 mg  5 mg Oral TID WC Martha Diaz MD        acetaminophen (TYLENOL) tablet 650 mg  650 mg Oral Q6H PRN Josi Coon MD        Or    acetaminophen (TYLENOL) suppository 650 mg  650 mg Rectal Q6H PRN Josi Coon MD        sodium chloride flush 0.9 % injection 5-40 mL  5-40 mL IntraVENous 2 times per day Hernan Mcbride MD   10 mL at 05/05/22 0919    sodium chloride flush 0.9 % injection 5-40 mL  5-40 mL IntraVENous PRN Kvng Patel MD        0.9 % sodium chloride infusion   IntraVENous PRN Kvng Patel MD        heparin flush 100 UNIT/ML injection 100 Units  1 mL IntraVENous 2 times per day Hernan Mcbride MD   100 Units at 05/05/22 2051    heparin flush 100 UNIT/ML injection 100 Units  1 mL IntraCATHeter PRN Kvng Patel MD        dextrose 5 % and 0.45 % NaCl with KCl 20 mEq infusion   IntraVENous Continuous Mateo Florian MD 75 mL/hr at 05/06/22 0247 New Bag at 05/06/22 0247    sodium chloride flush 0.9 % injection 5-40 mL  5-40 mL IntraVENous 2 times per day Guevara Oviedo MD   10 mL at 05/05/22 2051    sodium chloride flush 0.9 % injection 5-40 mL  5-40 mL IntraVENous PRN Jayme Luong MD        0.9 % sodium chloride infusion   IntraVENous PRN Jayme Luong MD        heparin flush 100 UNIT/ML injection 300 Units  3 mL IntraVENous 2 times per day Guevara Oviedo MD   300 Units at 05/04/22 2205    heparin flush 100 UNIT/ML injection 300 Units  3 mL IntraCATHeter PRN Jayme Luong MD        perflutren lipid microspheres (DEFINITY) injection 1.65 mg  1.5 mL IntraVENous ONCE PRN Evan Alarcon DO        folic acid injection 1 mg  1 mg IntraVENous Daily Author MD Marilu   1 mg at 05/06/22 1115    pantoprazole (PROTONIX) injection 40 mg  40 mg IntraVENous BID Jacqueline Banks MD   40 mg at 05/05/22 2051    ertapenem (INVanz) 1000 mg IVPB minibag  1,000 mg IntraVENous Q24H Bri Chavez MD   Stopped at 05/06/22 1323    [Held by provider] clopidogrel (PLAVIX) tablet 75 mg  75 mg Oral Daily Clearence MD Alban   75 mg at 05/01/22 8070    vitamin D (ERGOCALCIFEROL) capsule 50,000 Units  50,000 Units Oral Once per day on Thu Clearence MD Alban        [Held by provider] ferrous sulfate (IRON 325) tablet 325 mg  325 mg Oral Every Other Day Clearence MD Alban   325 mg at 04/30/22 2126    fluticasone (FLONASE) 50 MCG/ACT nasal spray 2 spray  2 spray Each Nostril Daily Clearence MD Alban   2 spray at 05/05/22 0918    [Held by provider] furosemide (LASIX) tablet 40 mg  40 mg Oral Daily Clearence MD Alban   40 mg at 05/01/22 0837    [Held by provider] magnesium oxide (MAG-OX) tablet 400 mg  400 mg Oral Daily Clearence MD Alban   400 mg at 05/01/22 0838    [Held by provider] oxybutynin (DITROPAN-XL) extended release tablet 10 mg  10 mg Oral Daily Clearence MD Alban   10 mg at 05/01/22 0838    [Held by provider] potassium bicarb-citric acid (EFFER-K) effervescent tablet 20 mEq  20 mEq Oral Daily Sandraence MD Alban   20 mEq at 05/01/22 0838    sertraline (ZOLOFT) tablet 50 mg  50 mg Oral Daily Maximus Littlejohn MD   50 mg at 05/01/22 0838    atorvastatin (LIPITOR) tablet 10 mg  10 mg Oral Daily Clearence MD Alban   10 mg at 05/01/22 0837    [Held by provider] trospium (SANCTURA) tablet 20 mg  20 mg Oral Nightly Clearence MD Alban   20 mg at 04/30/22 2126    [Held by provider] enoxaparin (LOVENOX) injection 40 mg  40 mg SubCUTAneous Daily Nick Garza MD   40 mg at 05/02/22 0185    polyethylene glycol (GLYCOLAX) packet 17 g  17 g Oral Daily PRN Nick Garza MD        ipratropium-albuterol (DUONEB) nebulizer solution 1 ampule  1 ampule Inhalation Q4H PRN Nick Garza MD            REVIEW OF SYSTEMS:       CONSTITUTIONAL: Denies fever or chills   HEENT: Denies sore throat    RESPIRATORY: denies cough, shortness of breath, sputum expectoration  CARDIOVASCULAR:  Denies palpitation or chest pain   GASTROINTESTINAL:  Denies abdomen pain, diarrhea or constipation. GENITOURINARY:  Denies burning urination or frequency of urination  INTEGUMENT: denies wound , rash  HEMATOLOGIC/LYMPHATIC:  Denies lymph node swelling, gum bleeding or easy bruising. MUSCULOSKELETAL:  Denies leg pain , joint pain , joint swelling  NEUROLOGICAL:  Awake and alert         PHYSICAL EXAM:      Vitals:     Vitals:    05/06/22 0820   BP: 127/64   Pulse: 79   Resp: 18   Temp: 97.6 °F (36.4 °C)   SpO2: 98%       General Appearance:    Awake and alert . Head:    Normocephalic, atraumatic   Eyes:    No pallor, no icterus,   Ears:    No obvious deformity or drainage.    Nose:   No nasal drainage   Throat:   Mucosa moist, no oral thrush   Neck:   Supple, no lymphadenopathy   Back:     no CVA tenderness   Lungs:     Clear to auscultation bilaterally, no wheeze    Heart:    Regular rate and rhythm, no murmur   Abdomen:     Soft, non-tender, bowel sounds present    Extremities:   +  edema, no cyanosis    Pulses:   Dorsalis pedis palpable    Skin:   no rashes     Lines right arm PICC ( 5/2)     CBC with Differential:      Lab Results   Component Value Date    WBC 7.4 05/06/2022    RBC 2.97 05/06/2022    HGB 8.3 05/06/2022    HCT 26.4 05/06/2022    PLT 29 05/06/2022    MCV 88.9 05/06/2022    MCH 27.9 05/06/2022    MCHC 31.4 05/06/2022    RDW 16.8 05/06/2022    NRBC 0.9 05/05/2022    METASPCT 0.9 05/01/2022    LYMPHOPCT 55.0 05/06/2022    MONOPCT 2.0 05/06/2022 MYELOPCT 2.6 05/05/2022    BASOPCT 0.0 05/06/2022    MONOSABS 0.15 05/06/2022    LYMPHSABS 4.07 05/06/2022    EOSABS 0.44 05/06/2022    BASOSABS 0.00 05/06/2022       CMP     Lab Results   Component Value Date     05/06/2022    K 3.6 05/06/2022     05/06/2022    CO2 22 05/06/2022    BUN 9 05/06/2022    CREATININE 0.5 05/06/2022    GFRAA >60 05/06/2022    LABGLOM >60 05/06/2022    GLUCOSE 138 05/06/2022    PROT 4.5 05/06/2022    LABALBU 1.8 05/06/2022    CALCIUM 7.0 05/06/2022    BILITOT 0.4 05/06/2022    ALKPHOS 84 05/06/2022    AST 13 05/06/2022    ALT 6 05/06/2022         Hepatic Function Panel:    Lab Results   Component Value Date    ALKPHOS 84 05/06/2022    ALT 6 05/06/2022    AST 13 05/06/2022    PROT 4.5 05/06/2022    BILITOT 0.4 05/06/2022    LABALBU 1.8 05/06/2022       PT/INR:    Lab Results   Component Value Date    PROTIME 11.6 05/04/2022    INR 1.1 05/04/2022       TSH:    Lab Results   Component Value Date    TSH 2.860 05/02/2022       U/A:    Lab Results   Component Value Date    COLORU OTHER 04/30/2022    PHUR 8.5 04/30/2022    LABCAST FEW 02/07/2015    WBCUA PACKED 04/30/2022    RBCUA PACKED 04/30/2022    BACTERIA MANY 04/30/2022    CLARITYU TURBID 04/30/2022    SPECGRAV 1.010 04/30/2022    LEUKOCYTESUR LARGE 04/30/2022    UROBILINOGEN 1.0 04/30/2022    BILIRUBINUR LARGE 04/30/2022    BLOODU LARGE 04/30/2022    GLUCOSEU Negative 04/30/2022    AMORPHOUS PRESENT 03/27/2022       ABG:  No results found for: FJB8TIN, BEART, D8NIZVLN, PHART, THGBART, ZII3MID, PO2ART, IXL0WYJ    MICROBIOLOGY:    Blood culture -  Susceptibility      Escherichia coli (1)    Antibiotic Interpretation Microscan  Method Status    amoxicillin-clavulanate Intermediate ^16 mcg/mL BACTERIAL SUSCEPTIBILITY PANEL BY SADA     ceFAZolin Resistant >=^64 mcg/mL BACTERIAL SUSCEPTIBILITY PANEL BY SADA     cefepime Resistant ^16 mcg/mL BACTERIAL SUSCEPTIBILITY PANEL BY SADA     cefotaxime Resistant >=^64 mcg/mL BACTERIAL SUSCEPTIBILITY PANEL BY SADA     cefOXitin Intermediate ^16 mcg/mL BACTERIAL SUSCEPTIBILITY PANEL BY SADA     cefTAZidime-avibactam Sensitive ^0.5 mcg/mL BACTERIAL SUSCEPTIBILITY PANEL BY SADA     gentamicin Sensitive <=^1 mcg/mL BACTERIAL SUSCEPTIBILITY PANEL BY SADA     levofloxacin Sensitive ^1 mcg/mL BACTERIAL SUSCEPTIBILITY PANEL BY SADA     meropenem Sensitive <=^0.25 mcg/mL BACTERIAL SUSCEPTIBILITY PANEL BY SADA     piperacillin-tazobactam Sensitive <=^4 mcg/mL BACTERIAL SUSCEPTIBILITY PANEL BY SADA     trimethoprim-sulfamethoxazole Sensitive <=^20 mcg/mL BACTERIAL SUSCEPTIBILITY PANEL BY SADA       Providencia stuartii (2)    Antibiotic Interpretation Microscan  Method Status    ceFAZolin Resistant >=^64 mcg/mL BACTERIAL SUSCEPTIBILITY PANEL BY SADA     cefepime Sensitive <=^0.12 mcg/mL BACTERIAL SUSCEPTIBILITY PANEL BY SADA     cefotaxime Sensitive <=^0.25 mcg/mL BACTERIAL SUSCEPTIBILITY PANEL BY SADA     cefOXitin Sensitive <=^4 mcg/mL BACTERIAL SUSCEPTIBILITY PANEL BY SADA     cefTAZidime-avibactam Sensitive ^0. 25 mcg/mL BACTERIAL SUSCEPTIBILITY PANEL BY SADA     gentamicin Resistant <=^1 mcg/mL BACTERIAL SUSCEPTIBILITY PANEL BY SADA     levofloxacin Intermediate ^4 mcg/mL BACTERIAL SUSCEPTIBILITY PANEL BY SADA     meropenem Sensitive <=^0.25 mcg/mL BACTERIAL SUSCEPTIBILITY PANEL BY SADA     piperacillin-tazobactam Sensitive <=^4 mcg/mL BACTERIAL SUSCEPTIBILITY PANEL BY SADA     trimethoprim-sulfamethoxazole Sensitive <=^20 mcg/mL BACTERIAL SUSCEPTIBILITY PANEL BY SADA         Urine Culture -    10 to 100,000 CFU/mL   Mixed katie isolated. Further workup and sensitivity testing   is not routinely indicated and will not be performed. Mixed katie isolated includes:   Mixed gram negative rods   Proteus species   Nonhemolytic Strep species    Narrative:           Radiology :    Chest X ray : no infiltrates       IMPRESSION:     1  ESBL ( CTX M ) E coli , Providencia bacteremia, sepsis   2.  CAUTI ( Cobos catheter was changed )   3. Leukocytosis - improved       RECOMMENDATIONS:      1. IV invanz 1 gram q 24 hrs till 5/15/22   2.  Contact isolation

## 2022-05-07 NOTE — PROGRESS NOTES
Inpatient Hematology/Oncology Progress Note    Subjective:  Fatigue. Objective:  /68   Pulse 91   Temp 97.4 °F (36.3 °C) (Temporal)   Resp 20   Ht 5' 2\" (1.575 m)   Wt 231 lb 1.1 oz (104.8 kg)   SpO2 99%   BMI 42.26 kg/m²   GENERAL: tired   HEENT: PERRLA; EOMI. Oropharynx clear. NECK: Supple. Without lymphadenopathy. LUNGS: Decreased air entry bilaterally. CARDIOVASCULAR: Regular rate. No murmurs, rubs or gallops. ABDOMEN: Soft. Non-tender, non-distended. EXTREMITIES: bruising BUE. NEUROLOGIC: No focal deficits. Diagnostics  Lab Results   Component Value Date    WBC 8.4 05/07/2022    HGB 7.9 (L) 05/07/2022    HCT 24.3 (L) 05/07/2022    MCV 87.4 05/07/2022    PLT 26 (L) 05/07/2022     Lab Results   Component Value Date     05/07/2022    K 4.9 05/07/2022     (H) 05/07/2022    CO2 20 (L) 05/07/2022    BUN 8 05/07/2022    CREATININE 0.5 05/07/2022    GLUCOSE 410 (H) 05/07/2022    CALCIUM 6.8 (L) 05/07/2022    PROT 4.1 (L) 05/07/2022    LABALBU 1.6 (L) 05/07/2022    BILITOT 0.4 05/07/2022    ALKPHOS 83 05/07/2022    AST 13 05/07/2022    ALT 6 05/07/2022    LABGLOM >60 05/07/2022    GFRAA >60 05/07/2022     Lab Results   Component Value Date    IRON 82 05/02/2022    TIBC SEE BELOW (AA) 05/02/2022    FERRITIN 1,482 05/02/2022     Lab Results   Component Value Date    RETICCTPCT 0.2 (L) 05/02/2022     Impression/Plan:  80-year-old female patient with past medical history significant for hypertension, CKD, hyperlipidemia, CHF, TIA, anemia, and depression admitted 4/30/2022 with altered mental status from her nursing facility. Upon admission, WBC 19.5, hemoglobin 10.6, hematocrit 33.2, platelets 076, sodium 145, potassium 3.3, BUN 23, creatinine 0.8, calcium 8.4, lactic acid 1.9. Urinalysis revealed positive nitrites, large amount of leukocyte esterase positive WBC, positive RBC indicative of UTI. Urine culture from 3/28/2022 positive E. Coli.   Blood culture x1 on 4/30/2022 positive E. Coli. ID is following.     On 5/1/2022 patient developed hypotension prompting a transfer to the ICU. NICOM was obtained and indicated patient was fluid responsive. She received a total of 1.5 L bolus and blood pressure improved. She was also noted to have a drop in hemoglobin and developed worsening thrombocytopenia. On 5/1/2022 her hemoglobin dropped from 11.3-8.4. Hemoglobin then dropped to 6.9 on 5/2/2022 requiring transfusion. Prior to her transfusion iron studies were obtained. Iron 82, ferritin 1482, iron saturation cannot be calculated due to unsaturated iron binding capacity result being below analytical measuring range. Vitamin B12 965, folate less than 2. She has been started on intravenous folic acid. , Peripheral blood smear revealing thrombocytopenia which may be related to decreased production or increased destruction. Normocytic anemia. Leukocytosis with neutrophilia and absolute lymphocytes, monocytes, eosinophils are decreased.     Blood work done prior to admission on 3/27/2022 revealed a normal hemoglobin of 13.7, hematocrit 44, and mild thrombocytopenia with platelets 074. We have been asked to see her regarding anemia and thrombocytopenia in the setting of sepsis.     New onset anemia and thrombocytopenia likely secondary to sepsis and inflammation. ID following. On Shreyas Lucio till 05/15/2022. Contact isolation  Peripheral blood smear revealing thrombocytopenia which may be related to decreased production or increased destruction. Normocytic anemia. Leukocytosis with neutrophilia and absolute lymphocytes, monocytes, eosinophils are decreased. Anemia/Thrombocytopenia workup ordered. Haptoglobin 242  RF 11 (0-13)  GERARDO Negative  HIV Non-Reactive  Acute hepatitis panel Non-Reactive  Zinc copper pending  JAK2 pending  Flow cytometry pending  MDS FISH pending  Folate deficient. IV Folic acid ordered  Monitor CBC with diff. Tranfuse to keep Hgb >7, PLT >10.    Will continue to follow    05/07/2022  Debi Sr MD

## 2022-05-07 NOTE — PROGRESS NOTES
Hospitalist Progress Note      PCP: Luciano Karimi MD    Date of Admission: 4/30/2022    Hospital Course:   \" 78-year-old nursing home resident with chronic lizama ,presents with altered mental status. EMS reports nursing home states she was unresponsive except to pain and was disoriented. Patient was found to have inner tract infection in the ED and she was started on Rocephin. complicated by hypotension requiring ICU stay. \"       Subjective:    No complaints. She is lethargic at times.     Medications:  Reviewed    Infusion Medications    sodium chloride      sodium chloride      dextrose 5% and 0.45% NaCl with KCl 20 mEq 75 mL/hr at 05/06/22 0247    sodium chloride       Scheduled Medications    white petrolatum   Topical BID    midodrine  5 mg Oral TID WC    sodium chloride flush  5-40 mL IntraVENous 2 times per day    heparin flush  1 mL IntraVENous 2 times per day    sodium chloride flush  5-40 mL IntraVENous 2 times per day    heparin flush  3 mL IntraVENous 2 times per day    folic acid  1 mg IntraVENous Daily    pantoprazole  40 mg IntraVENous BID    ertapenem (INVanz) IVPB  1,000 mg IntraVENous Q24H    [Held by provider] clopidogrel  75 mg Oral Daily    vitamin D  50,000 Units Oral Once per day on Thu    [Held by provider] ferrous sulfate  325 mg Oral Every Other Day    fluticasone  2 spray Each Nostril Daily    [Held by provider] furosemide  40 mg Oral Daily    [Held by provider] magnesium oxide  400 mg Oral Daily    [Held by provider] oxybutynin  10 mg Oral Daily    [Held by provider] potassium bicarb-citric acid  20 mEq Oral Daily    sertraline  50 mg Oral Daily    atorvastatin  10 mg Oral Daily    [Held by provider] trospium  20 mg Oral Nightly    [Held by provider] enoxaparin  40 mg SubCUTAneous Daily     PRN Meds: white petrolatum, sodium chloride, acetaminophen **OR** acetaminophen, sodium chloride flush, sodium chloride, heparin flush, sodium chloride flush, sodium chloride, heparin flush, perflutren lipid microspheres, polyethylene glycol, ipratropium-albuterol    No intake or output data in the 24 hours ending 05/07/22 1327    Physical Exam Performed:    /64   Pulse 88   Temp 97.6 °F (36.4 °C) (Temporal)   Resp 20   Ht 5' 2\" (1.575 m)   Wt 231 lb 1.1 oz (104.8 kg)   SpO2 98%   BMI 42.26 kg/m²     General appearance: No apparent distress, appears stated age. Lethargic. HEENT: Pupils equal, round, and reactive to light. Conjunctivae/corneas clear. Neck: Supple, with full range of motion. No jugular venous distention. Trachea midline. Respiratory:  Normal respiratory effort. Clear to auscultation, bilaterally without Rales/Wheezes/Rhonchi. Cardiovascular: Regular rate and rhythm with normal S1/S2 without murmurs, rubs or gallops. Abdomen: Soft, non-tender, non-distended with normal bowel sounds. Musculoskeletal: No clubbing, cyanosis or edema bilaterally. Full range of motion without deformity. Skin: Skin color, texture, turgor normal.  No rashes or lesions. Neurologic:  Moving ext  Psychiatric: Alert and oriented x 1      Labs:   Recent Labs     05/05/22  0320 05/05/22  1000 05/06/22  0430 05/07/22  0200 05/07/22  0600   WBC 5.9  --  7.4  --  8.4   HGB 8.3*   < > 8.3* 8.2* 7.9*   HCT 25.7*   < > 26.4* 25.9* 24.3*   PLT 29*  --  29*  --  26*    < > = values in this interval not displayed. Recent Labs     05/05/22  0320 05/06/22  0430 05/07/22  0600    142 140   K 4.8 3.6 4.9   * 111* 113*   CO2 20* 22 20*   BUN 11 9 8   CREATININE 0.6 0.5 0.5   CALCIUM 6.8* 7.0* 6.8*     Recent Labs     05/05/22  0320 05/06/22  0430 05/07/22  0600   AST 10 13 13   ALT 6 6 6   BILITOT 0.4 0.4 0.4   ALKPHOS 79 84 83     Recent Labs     05/04/22  1755   INR 1.1     No results for input(s): CKTOTAL, TROPONINI in the last 72 hours.     Urinalysis:      Lab Results   Component Value Date    NITRU POSITIVE 04/30/2022    WBCUA PACKED 04/30/2022    BACTERIA MANY 04/30/2022    RBCUA PACKED 04/30/2022    BLOODU LARGE 04/30/2022    SPECGRAV 1.010 04/30/2022    GLUCOSEU Negative 04/30/2022       Radiology:  FL MODIFIED BARIUM SWALLOW W VIDEO   Final Result   Severely limited evaluation due to patient unable to pass contrast media   through the hypopharynx. Please see separate speech pathology report for full discussion of findings   and recommendations. CT HEAD WO CONTRAST   Final Result   No acute intracranial abnormality. Cortical atrophy and periventricular leukomalacia. XR CHEST (2 VW)   Final Result   Cardiomegaly without signs of congestion         CT Head WO Contrast   Final Result   No acute intracranial abnormality. There is age-appropriate atrophy and   small-vessel ischemic disease. CT chest.      There is borderline cardiomegaly. There is mediastinal lipomatosis. There   is mild ectasia of the ascending thoracic aorta measuring 3.7 x 3.5 cm. The   trachea and major bronchi are patent. Nonenlarged mediastinal lymph nodes   are present. There is mild peribronchial thickening concerning for   bronchitis with minimal atelectasis in the lung bases. There is no focal   consolidation or pleural effusion. Liver is of normal architecture. Bilateral adrenal nodules are present with larger 1 on the right side   measuring 2.3 x 3.9 cm degenerative changes are identified in the thoracic   spine. Impression      Mild cardiomegaly with bronchitis and atelectasis in the lung bases. There   is no focal consolidation or edema. Bilateral adrenal nodules. RECOMMENDATIONS:   Unavailable         CT CHEST WO CONTRAST   Final Result   No acute intracranial abnormality. There is age-appropriate atrophy and   small-vessel ischemic disease. CT chest.      There is borderline cardiomegaly. There is mediastinal lipomatosis. There   is mild ectasia of the ascending thoracic aorta measuring 3.7 x 3.5 cm.   The   trachea and major bronchi are patent. Nonenlarged mediastinal lymph nodes   are present. There is mild peribronchial thickening concerning for   bronchitis with minimal atelectasis in the lung bases. There is no focal   consolidation or pleural effusion. Liver is of normal architecture. Bilateral adrenal nodules are present with larger 1 on the right side   measuring 2.3 x 3.9 cm degenerative changes are identified in the thoracic   spine. Impression      Mild cardiomegaly with bronchitis and atelectasis in the lung bases. There   is no focal consolidation or edema. Bilateral adrenal nodules. RECOMMENDATIONS:   Unavailable         XR CHEST PORTABLE   Final Result   Rotational artifact with either artifactual prominence of the right hilum or   a right hilar mass. Recommend follow-up with two view chest radiograph or   chest CT to exclude a neoplastic process. Assessment/Plan:    Active Hospital Problems    Diagnosis     Acute metabolic encephalopathy [W46.50]      Priority: Medium     Hypotension-due to hypovolemia/sepsis  -BP improved but borderline. Continue IVF. -Critical care was consulted. Patient was given midodrine. Currently NPO thus not getting oral medications,SLP to assess. Recommend echocardiogram.Held lasix. Acute metabolic encephalopathy-likely secondary to urinary tract infection/bacteremia  No improvement  -CT head non acute X 2  -Check MRI brain  -Ammonia levels and ABG wnl  -Continue antibiotic for bacteremia  -Neurology consultation    ESBL E. coli bacteremia  -ID consulted. Continue ivanz x 10days  -Urine culture shows mixed katie  -No pneumonia on CT chest    FEN  -SLP consulted. Mentation is affecting evaluation. Will consider PEG tube    Hypokalemia  -Monitor and replace    False hyperglycemia  -Labs are being drawn from PICC line with dextrose solution. POCT glucose levels are within range.     Acute on Chronic anemia-suspect bone marrow suppression.  -Hgb <7.0,Transfuse PRBC. Consent to be obtained from family. Decrease frequency of blood draws.  -Hold Plavix, Lovenox  -FU FOBT  -Folic acid low,started on folic acidosis    Thrombocytopenia-likely due to bacteremia  -Platelets continue trending down. Transfuse as needed. Hold Lovenox. Consulted Oncology. Suspect infection and inflammation. Cytometry pending.     Other medical issues.   CHF  HLD  History of chronic back pain and impaired mobility  Hx of polio  Hx of endometrial ca  Hx of uti in march 2022  Hx of intara abdomin infection  Dec 2021    DVT Prophylaxis: SCDs due to anemia  Diet: Diet NPO  Code Status: Limited    PT/OT Eval Status: as needed    Dispo - Return to SNF in [facility], [place]

## 2022-05-07 NOTE — PROGRESS NOTES
Department of Internal Medicine  Infectious Diseases  Progress  Note      C/C : E coli and Providencia bacteremia, sepsis     Pt is awake and alert   Denies fever or chills  Denies pain   No distress  Afebrile       Current Facility-Administered Medications   Medication Dose Route Frequency Provider Last Rate Last Admin    white petrolatum ointment   Topical BID Melvina Prabhakar MD   Given at 05/06/22 2136    white petrolatum ointment   Topical TID PRN Melvina Prabhakar MD        0.9 % sodium chloride infusion   IntraVENous PRN Dneis Chick, DO        midodrine (PROAMATINE) tablet 5 mg  5 mg Oral TID WC Melvina Prabhakar MD        acetaminophen (TYLENOL) tablet 650 mg  650 mg Oral Q6H PRN Etelvina Malik MD        Or    acetaminophen (TYLENOL) suppository 650 mg  650 mg Rectal Q6H PRN Etelvina Malik MD        sodium chloride flush 0.9 % injection 5-40 mL  5-40 mL IntraVENous 2 times per day Ana Yepez MD   10 mL at 05/06/22 2138    sodium chloride flush 0.9 % injection 5-40 mL  5-40 mL IntraVENous PRN Kvng Ng MD        0.9 % sodium chloride infusion   IntraVENous PRN Ana Yepez MD        heparin flush 100 UNIT/ML injection 100 Units  1 mL IntraVENous 2 times per day Ana Yepez MD   100 Units at 05/06/22 2135    heparin flush 100 UNIT/ML injection 100 Units  1 mL IntraCATHeter PRN Ana Yepez MD        dextrose 5 % and 0.45 % NaCl with KCl 20 mEq infusion   IntraVENous Continuous Lucero Donovan MD 75 mL/hr at 05/06/22 0247 New Bag at 05/06/22 0247    sodium chloride flush 0.9 % injection 5-40 mL  5-40 mL IntraVENous 2 times per day Alma Barone MD   10 mL at 05/06/22 2138    sodium chloride flush 0.9 % injection 5-40 mL  5-40 mL IntraVENous PRN Jayme Luong MD        0.9 % sodium chloride infusion   IntraVENous PRN Jayme Luong MD        heparin flush 100 UNIT/ML injection 300 Units  3 mL IntraVENous 2 times per day Alma Barone MD   300 Units at 05/06/22 2137    heparin flush 100 UNIT/ML injection 300 Units  3 mL IntraCATHeter PRN Jayme Luong MD        perflutren lipid microspheres (DEFINITY) injection 1.65 mg  1.5 mL IntraVENous ONCE PRN Evan Alarcon DO        folic acid injection 1 mg  1 mg IntraVENous Daily Author Marilu MD   1 mg at 05/06/22 1115    pantoprazole (PROTONIX) injection 40 mg  40 mg IntraVENous BID Jacqueline Banks MD   40 mg at 05/06/22 2138    ertapenem (INVanz) 1000 mg IVPB minibag  1,000 mg IntraVENous Q24H Bri Chavez MD   Stopped at 05/06/22 1323    [Held by provider] clopidogrel (PLAVIX) tablet 75 mg  75 mg Oral Daily Clearence MD Alban   75 mg at 05/01/22 2945    vitamin D (ERGOCALCIFEROL) capsule 50,000 Units  50,000 Units Oral Once per day on Thu Clearence MD Alban        [Held by provider] ferrous sulfate (IRON 325) tablet 325 mg  325 mg Oral Every Other Day Clearence MD Alban   325 mg at 04/30/22 2126    fluticasone (FLONASE) 50 MCG/ACT nasal spray 2 spray  2 spray Each Nostril Daily Clearence MD Alban   2 spray at 05/05/22 0918    [Held by provider] furosemide (LASIX) tablet 40 mg  40 mg Oral Daily Clearence MD Alban   40 mg at 05/01/22 0837    [Held by provider] magnesium oxide (MAG-OX) tablet 400 mg  400 mg Oral Daily Clearence MD Alban   400 mg at 05/01/22 0838    [Held by provider] oxybutynin (DITROPAN-XL) extended release tablet 10 mg  10 mg Oral Daily Clearence MD Alban   10 mg at 05/01/22 0838    [Held by provider] potassium bicarb-citric acid (EFFER-K) effervescent tablet 20 mEq  20 mEq Oral Daily Clearence MD Alban   20 mEq at 05/01/22 0838    sertraline (ZOLOFT) tablet 50 mg  50 mg Oral Daily Clearence MD Alban   50 mg at 05/01/22 0838    atorvastatin (LIPITOR) tablet 10 mg  10 mg Oral Daily Clearence MD Alban   10 mg at 05/01/22 0837    [Held by provider] trospium (SANCTURA) tablet 20 mg  20 mg Oral Nightly Clearence MD Alban   20 mg at 04/30/22 212    [Held by provider] enoxaparin (LOVENOX) injection 40 mg  40 mg SubCUTAneous Daily Juan Jose Phoenix MD   40 mg at 05/02/22 1199    polyethylene glycol (GLYCOLAX) packet 17 g  17 g Oral Daily PRN Juan Jose Phoenix MD        ipratropium-albuterol (DUONEB) nebulizer solution 1 ampule  1 ampule Inhalation Q4H PRN Juan Jose Phoenix MD            REVIEW OF SYSTEMS:       CONSTITUTIONAL: Denies fever or chills   HEENT: Denies sore throat    RESPIRATORY: denies cough, shortness of breath, sputum expectoration  CARDIOVASCULAR:  Denies palpitation or chest pain   GASTROINTESTINAL:  Denies abdomen pain, diarrhea or constipation. GENITOURINARY:  Denies burning urination or frequency of urination  INTEGUMENT: denies wound , rash  HEMATOLOGIC/LYMPHATIC:  Denies lymph node swelling, gum bleeding or easy bruising. MUSCULOSKELETAL:  Denies leg pain , joint pain , joint swelling  NEUROLOGICAL:  Awake and alert         PHYSICAL EXAM:      Vitals:     Vitals:    05/07/22 0100   BP: 107/68   Pulse: 91   Resp: 20   Temp: 97.4 °F (36.3 °C)   SpO2: 99%       General Appearance:    Awake and alert . Head:    Normocephalic, atraumatic   Eyes:    No pallor, no icterus,   Ears:    No obvious deformity or drainage.    Nose:   No nasal drainage   Throat:   Mucosa moist, no oral thrush   Neck:   Supple, no lymphadenopathy   Back:     no CVA tenderness   Lungs:     Clear to auscultation bilaterally, no wheeze    Heart:    Regular rate and rhythm, no murmur   Abdomen:     Soft, non-tender, bowel sounds present    Extremities:   +  edema, no cyanosis    Pulses:   Dorsalis pedis palpable    Skin:   no rashes     Lines right arm PICC ( 5/2)     CBC with Differential:      Lab Results   Component Value Date    WBC 8.4 05/07/2022    RBC 2.78 05/07/2022    HGB 7.9 05/07/2022    HCT 24.3 05/07/2022    PLT 26 05/07/2022    MCV 87.4 05/07/2022    MCH 28.4 05/07/2022    MCHC 32.5 05/07/2022    RDW 17.1 05/07/2022    NRBC 0.9 05/05/2022    METASPCT 0.9 05/01/2022    LYMPHOPCT 40.9 05/07/2022    MONOPCT 5.0 05/07/2022    MYELOPCT 2.6 05/05/2022    BASOPCT 0.2 05/07/2022    MONOSABS 0.42 05/07/2022    LYMPHSABS 3.45 05/07/2022    EOSABS 0.32 05/07/2022    BASOSABS 0.02 05/07/2022       CMP     Lab Results   Component Value Date     05/07/2022    K 4.9 05/07/2022     05/07/2022    CO2 20 05/07/2022    BUN 8 05/07/2022    CREATININE 0.5 05/07/2022    GFRAA >60 05/07/2022    LABGLOM >60 05/07/2022    GLUCOSE 410 05/07/2022    PROT 4.1 05/07/2022    LABALBU 1.6 05/07/2022    CALCIUM 6.8 05/07/2022    BILITOT 0.4 05/07/2022    ALKPHOS 83 05/07/2022    AST 13 05/07/2022    ALT 6 05/07/2022         Hepatic Function Panel:    Lab Results   Component Value Date    ALKPHOS 83 05/07/2022    ALT 6 05/07/2022    AST 13 05/07/2022    PROT 4.1 05/07/2022    BILITOT 0.4 05/07/2022    LABALBU 1.6 05/07/2022       PT/INR:    Lab Results   Component Value Date    PROTIME 11.6 05/04/2022    INR 1.1 05/04/2022       TSH:    Lab Results   Component Value Date    TSH 2.860 05/02/2022       U/A:    Lab Results   Component Value Date    COLORU OTHER 04/30/2022    PHUR 8.5 04/30/2022    LABCAST FEW 02/07/2015    WBCUA PACKED 04/30/2022    RBCUA PACKED 04/30/2022    BACTERIA MANY 04/30/2022    CLARITYU TURBID 04/30/2022    SPECGRAV 1.010 04/30/2022    LEUKOCYTESUR LARGE 04/30/2022    UROBILINOGEN 1.0 04/30/2022    BILIRUBINUR LARGE 04/30/2022    BLOODU LARGE 04/30/2022    GLUCOSEU Negative 04/30/2022    AMORPHOUS PRESENT 03/27/2022       ABG:  No results found for: [place], BEART, [id number], PHART, THGBART, [id number], PO2ART, [id number]    MICROBIOLOGY:    Blood culture -  Susceptibility      Escherichia coli (1)    Antibiotic Interpretation Microscan  Method Status    amoxicillin-clavulanate Intermediate ^16 mcg/mL BACTERIAL SUSCEPTIBILITY PANEL BY SADA     ceFAZolin Resistant >=^64 mcg/mL BACTERIAL SUSCEPTIBILITY PANEL BY SADA     cefepime Resistant ^16 mcg/mL BACTERIAL SUSCEPTIBILITY PANEL BY SADA     cefotaxime Resistant >=^64 mcg/mL BACTERIAL SUSCEPTIBILITY PANEL BY SADA     cefOXitin Intermediate ^16 mcg/mL BACTERIAL SUSCEPTIBILITY PANEL BY SADA     cefTAZidime-avibactam Sensitive ^0.5 mcg/mL BACTERIAL SUSCEPTIBILITY PANEL BY SADA     gentamicin Sensitive <=^1 mcg/mL BACTERIAL SUSCEPTIBILITY PANEL BY SADA     levofloxacin Sensitive ^1 mcg/mL BACTERIAL SUSCEPTIBILITY PANEL BY SADA     meropenem Sensitive <=^0.25 mcg/mL BACTERIAL SUSCEPTIBILITY PANEL BY SADA     piperacillin-tazobactam Sensitive <=^4 mcg/mL BACTERIAL SUSCEPTIBILITY PANEL BY SADA     trimethoprim-sulfamethoxazole Sensitive <=^20 mcg/mL BACTERIAL SUSCEPTIBILITY PANEL BY SADA       Providencia stuartii (2)    Antibiotic Interpretation Microscan  Method Status    ceFAZolin Resistant >=^64 mcg/mL BACTERIAL SUSCEPTIBILITY PANEL BY SADA     cefepime Sensitive <=^0.12 mcg/mL BACTERIAL SUSCEPTIBILITY PANEL BY SADA     cefotaxime Sensitive <=^0.25 mcg/mL BACTERIAL SUSCEPTIBILITY PANEL BY SADA     cefOXitin Sensitive <=^4 mcg/mL BACTERIAL SUSCEPTIBILITY PANEL BY SADA     cefTAZidime-avibactam Sensitive ^0. 25 mcg/mL BACTERIAL SUSCEPTIBILITY PANEL BY SADA     gentamicin Resistant <=^1 mcg/mL BACTERIAL SUSCEPTIBILITY PANEL BY SADA     levofloxacin Intermediate ^4 mcg/mL BACTERIAL SUSCEPTIBILITY PANEL BY SADA     meropenem Sensitive <=^0.25 mcg/mL BACTERIAL SUSCEPTIBILITY PANEL BY SADA     piperacillin-tazobactam Sensitive <=^4 mcg/mL BACTERIAL SUSCEPTIBILITY PANEL BY SADA     trimethoprim-sulfamethoxazole Sensitive <=^20 mcg/mL BACTERIAL SUSCEPTIBILITY PANEL BY SADA         Urine Culture -    10 to 100,000 CFU/mL   Mixed katie isolated. Further workup and sensitivity testing   is not routinely indicated and will not be performed. Mixed katie isolated includes:   Mixed gram negative rods   Proteus species   Nonhemolytic Strep species    Narrative:           Radiology :    Chest X ray : no infiltrates       IMPRESSION:     1  ESBL ( CTX M ) E coli , Providencia bacteremia, sepsis   2.  CAUTI ( Cobos catheter was changed )   3. Leukocytosis - improved       RECOMMENDATIONS:      1. IV invanz 1 gram q 24 hrs till 5/15/22   2.  Contact isolation

## 2022-05-07 NOTE — PROGRESS NOTES
Pt is unable to answer questions for MRI screening. This nurse placed call to listed family member. No answer. Facility is unable to answer questions confidently. Will attempt family member again.

## 2022-05-08 NOTE — PROGRESS NOTES
Inpatient Hematology/Oncology Progress Note    Subjective:  Fatigue. No fever    Objective:  /60   Pulse 100   Temp 96.9 °F (36.1 °C) (Temporal)   Resp 16   Ht 5' 2\" (1.575 m)   Wt 228 lb 1 oz (103.4 kg)   SpO2 95%   BMI 41.71 kg/m²   GENERAL: tired   HEENT: PERRLA; EOMI. Oropharynx clear. NECK: Supple. Without lymphadenopathy. LUNGS: Decreased air entry bilaterally. CARDIOVASCULAR: Regular rate. No murmurs, rubs or gallops. ABDOMEN: Soft. Non-tender, non-distended. EXTREMITIES: bruising BUE. NEUROLOGIC: No focal deficits. Diagnostics  Lab Results   Component Value Date    WBC 9.8 05/08/2022    HGB 7.9 (L) 05/08/2022    HCT 24.4 (L) 05/08/2022    MCV 90.0 05/08/2022    PLT 29 (L) 05/08/2022     Lab Results   Component Value Date     05/08/2022    K 3.5 05/08/2022     (H) 05/08/2022    CO2 22 05/08/2022    BUN 8 05/08/2022    CREATININE 0.5 05/08/2022    GLUCOSE 141 (H) 05/08/2022    CALCIUM 7.2 (L) 05/08/2022    PROT 4.2 (L) 05/08/2022    LABALBU 1.7 (L) 05/08/2022    BILITOT 0.4 05/08/2022    ALKPHOS 89 05/08/2022    AST 11 05/08/2022    ALT 5 05/08/2022    LABGLOM >60 05/08/2022    GFRAA >60 05/08/2022     Lab Results   Component Value Date    IRON 82 05/02/2022    TIBC SEE BELOW (AA) 05/02/2022    FERRITIN 1,482 05/02/2022     Lab Results   Component Value Date    RETICCTPCT 0.2 (L) 05/02/2022     Impression/Plan:  45-year-old female patient with past medical history significant for hypertension, CKD, hyperlipidemia, CHF, TIA, anemia, and depression admitted 4/30/2022 with altered mental status from her nursing facility. Upon admission, WBC 19.5, hemoglobin 10.6, hematocrit 33.2, platelets 821, sodium 145, potassium 3.3, BUN 23, creatinine 0.8, calcium 8.4, lactic acid 1.9. Urinalysis revealed positive nitrites, large amount of leukocyte esterase positive WBC, positive RBC indicative of UTI. Urine culture from 3/28/2022 positive E. Coli.   Blood culture x1 on 4/30/2022 positive E. Coli. ID is following.     On 5/1/2022 patient developed hypotension prompting a transfer to the ICU. NICOM was obtained and indicated patient was fluid responsive. She received a total of 1.5 L bolus and blood pressure improved. She was also noted to have a drop in hemoglobin and developed worsening thrombocytopenia. On 5/1/2022 her hemoglobin dropped from 11.3-8.4. Hemoglobin then dropped to 6.9 on 5/2/2022 requiring transfusion. Prior to her transfusion iron studies were obtained. Iron 82, ferritin 1482, iron saturation cannot be calculated due to unsaturated iron binding capacity result being below analytical measuring range. Vitamin B12 965, folate less than 2. She has been started on intravenous folic acid. , Peripheral blood smear revealing thrombocytopenia which may be related to decreased production or increased destruction. Normocytic anemia. Leukocytosis with neutrophilia and absolute lymphocytes, monocytes, eosinophils are decreased.     Blood work done prior to admission on 3/27/2022 revealed a normal hemoglobin of 13.7, hematocrit 44, and mild thrombocytopenia with platelets 512. We have been asked to see her regarding anemia and thrombocytopenia in the setting of sepsis.     New onset anemia and thrombocytopenia likely secondary to sepsis and inflammation. ID following. On Suan Thorp till 05/15/2022. Contact isolation  Peripheral blood smear revealing thrombocytopenia which may be related to decreased production or increased destruction. Normocytic anemia. Leukocytosis with neutrophilia and absolute lymphocytes, monocytes, eosinophils are decreased. Anemia/Thrombocytopenia workup ordered. Haptoglobin 242  RF 11 (0-13)  GERARDO Negative  HIV Non-Reactive  Acute hepatitis panel Non-Reactive  Zinc copper pending  JAK2 pending  Flow cytometry pending  MDS FISH pending  Folate deficient. IV Folic acid   Hb 7.9; PLT 29K 05/08/2022  Monitor CBC with diff.  Tranfuse to keep Hgb >7, PLT >10.    Will continue to follow    05/08/2022  Walter Shone, MD

## 2022-05-08 NOTE — PROGRESS NOTES
Department of Internal Medicine  Infectious Diseases  Progress  Note      C/C : E coli and Providencia bacteremia, sepsis     Pt is awake and alert   Denies fever or chills  Denies pain   No distress  Afebrile       Current Facility-Administered Medications   Medication Dose Route Frequency Provider Last Rate Last Admin    dextrose 5 % and 0.45 % sodium chloride infusion   IntraVENous Continuous Dalia Hester MD 75 mL/hr at 05/08/22 0512 New Bag at 05/08/22 0512    white petrolatum ointment   Topical BID Dalia Hester MD   Given at 05/08/22 0917    white petrolatum ointment   Topical TID PRN Dalia Hester MD        0.9 % sodium chloride infusion   IntraVENous PRN Usman Vora DO        midodrine (PROAMATINE) tablet 5 mg  5 mg Oral TID  Dalia Hester MD        acetaminophen (TYLENOL) tablet 650 mg  650 mg Oral Q6H PRN Christy Boyd MD        Or    acetaminophen (TYLENOL) suppository 650 mg  650 mg Rectal Q6H PRN Christy Boyd MD        sodium chloride flush 0.9 % injection 5-40 mL  5-40 mL IntraVENous 2 times per day Josephine Patten MD   10 mL at 05/08/22 0918    sodium chloride flush 0.9 % injection 5-40 mL  5-40 mL IntraVENous PRN Josephine Patten MD        0.9 % sodium chloride infusion   IntraVENous PRN Josephine Patten MD        heparin flush 100 UNIT/ML injection 100 Units  1 mL IntraVENous 2 times per day Josephine Patten MD   100 Units at 05/08/22 0918    heparin flush 100 UNIT/ML injection 100 Units  1 mL IntraCATHeter PRN Josephine Patten MD        sodium chloride flush 0.9 % injection 5-40 mL  5-40 mL IntraVENous 2 times per day Nadia Marin MD   10 mL at 05/07/22 1107    sodium chloride flush 0.9 % injection 5-40 mL  5-40 mL IntraVENous PRN Jayme Luong MD        0.9 % sodium chloride infusion   IntraVENous PRN Jayme Luong MD        heparin flush 100 UNIT/ML injection 300 Units  3 mL IntraVENous 2 times per day Nadia Marin MD   300 Units at 05/06/22 2137    heparin flush 100 UNIT/ML injection 300 Units  3 mL IntraCATHeter PRN Jayme Luong MD        perflutren lipid microspheres (DEFINITY) injection 1.65 mg  1.5 mL IntraVENous ONCE PRN Cat Mcdaniel DO        folic acid injection 1 mg  1 mg IntraVENous Daily Juana Schmidt MD   1 mg at 05/08/22 0915    pantoprazole (PROTONIX) injection 40 mg  40 mg IntraVENous BID Stepan Mcgee MD   40 mg at 05/08/22 0915    ertapenem (INVanz) 1000 mg IVPB minibag  1,000 mg IntraVENous Q24H Cecil Beltran MD   Stopped at 05/07/22 1420    [Held by provider] clopidogrel (PLAVIX) tablet 75 mg  75 mg Oral Daily Delaney Roca MD   75 mg at 05/01/22 0838    vitamin D (ERGOCALCIFEROL) capsule 50,000 Units  50,000 Units Oral Once per day on Thu Delaney Roca MD        [Held by provider] ferrous sulfate (IRON 325) tablet 325 mg  325 mg Oral Every Other Day Dleaney Roca MD   325 mg at 04/30/22 2126    fluticasone (FLONASE) 50 MCG/ACT nasal spray 2 spray  2 spray Each Nostril Daily Delaney Roca MD   2 spray at 05/08/22 0917    [Held by provider] furosemide (LASIX) tablet 40 mg  40 mg Oral Daily Delaney Roca MD   40 mg at 05/01/22 0837    [Held by provider] magnesium oxide (MAG-OX) tablet 400 mg  400 mg Oral Daily Delaney Roca MD   400 mg at 05/01/22 0838    [Held by provider] oxybutynin (DITROPAN-XL) extended release tablet 10 mg  10 mg Oral Daily Delaney Roca MD   10 mg at 05/01/22 0838    [Held by provider] potassium bicarb-citric acid (EFFER-K) effervescent tablet 20 mEq  20 mEq Oral Daily Delaney Roca MD   20 mEq at 05/01/22 0838    sertraline (ZOLOFT) tablet 50 mg  50 mg Oral Daily Maximus Littlejohn MD   50 mg at 05/01/22 0838    atorvastatin (LIPITOR) tablet 10 mg  10 mg Oral Daily Delaney Roca MD   10 mg at 05/01/22 0837    [Held by provider] trospium (SANCTURA) tablet 20 mg  20 mg Oral Nightly Delaney Roca MD   20 mg at 04/30/22 0754    [Held by provider] enoxaparin (LOVENOX) injection 40 mg  40 mg SubCUTAneous Daily Juan Daniel Barfield MD   40 mg at 05/02/22 0624    polyethylene glycol (GLYCOLAX) packet 17 g  17 g Oral Daily PRN Juan Daniel Barfield MD        ipratropium-albuterol (DUONEB) nebulizer solution 1 ampule  1 ampule Inhalation Q4H PRN Juan Daniel Barfield MD            REVIEW OF SYSTEMS:       CONSTITUTIONAL: Denies fever or chills   HEENT: Denies sore throat    RESPIRATORY: denies cough, shortness of breath, sputum expectoration  CARDIOVASCULAR:  Denies palpitation or chest pain   GASTROINTESTINAL:  Denies abdomen pain, diarrhea or constipation. GENITOURINARY:  Denies burning urination or frequency of urination  INTEGUMENT: denies wound , rash  HEMATOLOGIC/LYMPHATIC:  Denies lymph node swelling, gum bleeding or easy bruising. MUSCULOSKELETAL:  Denies leg pain , joint pain , joint swelling  NEUROLOGICAL:  Awake and alert         PHYSICAL EXAM:      Vitals:     Vitals:    05/08/22 0813   BP: 100/60   Pulse: 100   Resp: 16   Temp: 96.9 °F (36.1 °C)   SpO2:        General Appearance:    Awake and alert . Head:    Normocephalic, atraumatic   Eyes:    No pallor, no icterus,   Ears:    No obvious deformity or drainage.    Nose:   No nasal drainage   Throat:   Mucosa moist, no oral thrush   Neck:   Supple, no lymphadenopathy   Back:     no CVA tenderness   Lungs:     Clear to auscultation bilaterally, no wheeze    Heart:    Regular rate and rhythm, no murmur   Abdomen:     Soft, non-tender, bowel sounds present    Extremities:   +  edema, no cyanosis    Pulses:   Dorsalis pedis palpable    Skin:   no rashes     Lines right arm PICC ( 5/2)     CBC with Differential:      Lab Results   Component Value Date    WBC 9.8 05/08/2022    RBC 2.90 05/08/2022    HGB 8.3 05/08/2022    HCT 26.1 05/08/2022    PLT 29 05/08/2022    MCV 90.0 05/08/2022    MCH 28.6 05/08/2022    MCHC 31.8 05/08/2022    RDW 17.3 05/08/2022    NRBC 0.9 05/05/2022    METASPCT 0.9 05/01/2022    LYMPHOPCT 42.7 05/08/2022    MONOPCT 5.1 05/08/2022 MYELOPCT 2.6 05/05/2022    BASOPCT 0.2 05/08/2022    MONOSABS 0.50 05/08/2022    LYMPHSABS 4.18 05/08/2022    EOSABS 0.41 05/08/2022    BASOSABS 0.02 05/08/2022       CMP     Lab Results   Component Value Date     05/08/2022    K 3.5 05/08/2022     05/08/2022    CO2 22 05/08/2022    BUN 8 05/08/2022    CREATININE 0.5 05/08/2022    GFRAA >60 05/08/2022    LABGLOM >60 05/08/2022    GLUCOSE 141 05/08/2022    PROT 4.2 05/08/2022    LABALBU 1.7 05/08/2022    CALCIUM 7.2 05/08/2022    BILITOT 0.4 05/08/2022    ALKPHOS 89 05/08/2022    AST 11 05/08/2022    ALT 5 05/08/2022         Hepatic Function Panel:    Lab Results   Component Value Date    ALKPHOS 89 05/08/2022    ALT 5 05/08/2022    AST 11 05/08/2022    PROT 4.2 05/08/2022    BILITOT 0.4 05/08/2022    LABALBU 1.7 05/08/2022       PT/INR:    Lab Results   Component Value Date    PROTIME 11.6 05/04/2022    INR 1.1 05/04/2022       TSH:    Lab Results   Component Value Date    TSH 2.860 05/02/2022       U/A:    Lab Results   Component Value Date    COLORU OTHER 04/30/2022    PHUR 8.5 04/30/2022    LABCAST FEW 02/07/2015    WBCUA PACKED 04/30/2022    RBCUA PACKED 04/30/2022    BACTERIA MANY 04/30/2022    CLARITYU TURBID 04/30/2022    SPECGRAV 1.010 04/30/2022    LEUKOCYTESUR LARGE 04/30/2022    UROBILINOGEN 1.0 04/30/2022    BILIRUBINUR LARGE 04/30/2022    BLOODU LARGE 04/30/2022    GLUCOSEU Negative 04/30/2022    AMORPHOUS PRESENT 03/27/2022       ABG:  No results found for: [id number], BEART, [id number], PHART, THGBART, [id number], PO2ART, [id number]    MICROBIOLOGY:    Blood culture -  Susceptibility      Escherichia coli (1)    Antibiotic Interpretation Microscan  Method Status    amoxicillin-clavulanate Intermediate ^16 mcg/mL BACTERIAL SUSCEPTIBILITY PANEL BY SADA     ceFAZolin Resistant >=^64 mcg/mL BACTERIAL SUSCEPTIBILITY PANEL BY SADA     cefepime Resistant ^16 mcg/mL BACTERIAL SUSCEPTIBILITY PANEL BY SADA     cefotaxime Resistant >=^64 mcg/mL BACTERIAL SUSCEPTIBILITY PANEL BY SADA     cefOXitin Intermediate ^16 mcg/mL BACTERIAL SUSCEPTIBILITY PANEL BY SADA     cefTAZidime-avibactam Sensitive ^0.5 mcg/mL BACTERIAL SUSCEPTIBILITY PANEL BY SADA     gentamicin Sensitive <=^1 mcg/mL BACTERIAL SUSCEPTIBILITY PANEL BY SADA     levofloxacin Sensitive ^1 mcg/mL BACTERIAL SUSCEPTIBILITY PANEL BY SADA     meropenem Sensitive <=^0.25 mcg/mL BACTERIAL SUSCEPTIBILITY PANEL BY SADA     piperacillin-tazobactam Sensitive <=^4 mcg/mL BACTERIAL SUSCEPTIBILITY PANEL BY SADA     trimethoprim-sulfamethoxazole Sensitive <=^20 mcg/mL BACTERIAL SUSCEPTIBILITY PANEL BY SADA       Providencia stuartii (2)    Antibiotic Interpretation Microscan  Method Status    ceFAZolin Resistant >=^64 mcg/mL BACTERIAL SUSCEPTIBILITY PANEL BY SADA     cefepime Sensitive <=^0.12 mcg/mL BACTERIAL SUSCEPTIBILITY PANEL BY SADA     cefotaxime Sensitive <=^0.25 mcg/mL BACTERIAL SUSCEPTIBILITY PANEL BY SADA     cefOXitin Sensitive <=^4 mcg/mL BACTERIAL SUSCEPTIBILITY PANEL BY SADA     cefTAZidime-avibactam Sensitive ^0. 25 mcg/mL BACTERIAL SUSCEPTIBILITY PANEL BY SADA     gentamicin Resistant <=^1 mcg/mL BACTERIAL SUSCEPTIBILITY PANEL BY SADA     levofloxacin Intermediate ^4 mcg/mL BACTERIAL SUSCEPTIBILITY PANEL BY SADA     meropenem Sensitive <=^0.25 mcg/mL BACTERIAL SUSCEPTIBILITY PANEL BY SADA     piperacillin-tazobactam Sensitive <=^4 mcg/mL BACTERIAL SUSCEPTIBILITY PANEL BY SADA     trimethoprim-sulfamethoxazole Sensitive <=^20 mcg/mL BACTERIAL SUSCEPTIBILITY PANEL BY SADA         Urine Culture -    10 to 100,000 CFU/mL   Mixed katie isolated. Further workup and sensitivity testing   is not routinely indicated and will not be performed. Mixed katie isolated includes:   Mixed gram negative rods   Proteus species   Nonhemolytic Strep species    Narrative:           Radiology :    Chest X ray : no infiltrates       IMPRESSION:     1  ESBL ( CTX M ) E coli , Providencia bacteremia, sepsis   2.  CAUTI ( Cobos catheter was changed )   3. Leukocytosis - improved       RECOMMENDATIONS:      1. IV invanz 1 gram q 24 hrs till 5/15/22   2.  Contact isolation

## 2022-05-08 NOTE — PROGRESS NOTES
Hospitalist Progress Note      PCP: Chico Garcia MD    Date of Admission: 4/30/2022    Hospital Course:   \" 25-year-old nursing home resident with chronic lizama ,presents with altered mental status. EMS reports nursing home states she was unresponsive except to pain and was disoriented. Patient was found to have inner tract infection in the ED and she was started on Rocephin. complicated by hypotension requiring ICU stay. \"       Subjective:    She wakes up to voice. She is not verbal.    Medications:  Reviewed    Infusion Medications    dextrose 5 % and 0.45 % NaCl 75 mL/hr at 05/08/22 6130    sodium chloride      sodium chloride      sodium chloride       Scheduled Medications    white petrolatum   Topical BID    midodrine  5 mg Oral TID WC    sodium chloride flush  5-40 mL IntraVENous 2 times per day    heparin flush  1 mL IntraVENous 2 times per day    sodium chloride flush  5-40 mL IntraVENous 2 times per day    heparin flush  3 mL IntraVENous 2 times per day    folic acid  1 mg IntraVENous Daily    pantoprazole  40 mg IntraVENous BID    ertapenem (INVanz) IVPB  1,000 mg IntraVENous Q24H    [Held by provider] clopidogrel  75 mg Oral Daily    vitamin D  50,000 Units Oral Once per day on Thu    [Held by provider] ferrous sulfate  325 mg Oral Every Other Day    fluticasone  2 spray Each Nostril Daily    [Held by provider] furosemide  40 mg Oral Daily    [Held by provider] magnesium oxide  400 mg Oral Daily    [Held by provider] oxybutynin  10 mg Oral Daily    [Held by provider] potassium bicarb-citric acid  20 mEq Oral Daily    sertraline  50 mg Oral Daily    atorvastatin  10 mg Oral Daily    [Held by provider] trospium  20 mg Oral Nightly    [Held by provider] enoxaparin  40 mg SubCUTAneous Daily     PRN Meds: white petrolatum, sodium chloride, acetaminophen **OR** acetaminophen, sodium chloride flush, sodium chloride, heparin flush, sodium chloride flush, sodium chloride, heparin flush, perflutren lipid microspheres, polyethylene glycol, ipratropium-albuterol      Intake/Output Summary (Last 24 hours) at 5/8/2022 1231  Last data filed at 5/8/2022 0519  Gross per 24 hour   Intake --   Output 450 ml   Net -450 ml       Physical Exam Performed:    /60   Pulse 100   Temp 96.9 °F (36.1 °C) (Temporal)   Resp 16   Ht 5' 2\" (1.575 m)   Wt 228 lb 1 oz (103.4 kg)   SpO2 95%   BMI 41.71 kg/m²     General appearance: No apparent distress, appears stated age. Lethargic. HEENT: Pupils equal, round, and reactive to light. Conjunctivae/corneas clear. Neck: Supple, with full range of motion. No jugular venous distention. Trachea midline. Respiratory:  Normal respiratory effort. Clear to auscultation, bilaterally without Rales/Wheezes/Rhonchi. Cardiovascular: Regular rate and rhythm with normal S1/S2 without murmurs, rubs or gallops. Abdomen: Soft, non-tender, non-distended with normal bowel sounds. Musculoskeletal: No clubbing, cyanosis or edema bilaterally. Full range of motion without deformity. Skin: Skin color, texture, turgor normal.  No rashes or lesions. Neurologic:  Moving ext  Psychiatric: Alert and oriented x 0      Labs:   Recent Labs     05/06/22  0430 05/07/22  0200 05/07/22  0600 05/07/22  0600 05/07/22  1644 05/08/22  0200 05/08/22  0548   WBC 7.4  --  8.4  --   --   --  9.8   HGB 8.3*   < > 7.9*   < > 8.5* 8.0* 8.3*   HCT 26.4*   < > 24.3*   < > 26.3* 24.8* 26.1*   PLT 29*  --  26*  --   --   --  29*    < > = values in this interval not displayed. Recent Labs     05/06/22  0430 05/07/22  0600 05/08/22  0548    140 146   K 3.6 4.9 3.5   * 113* 114*   CO2 22 20* 22   BUN 9 8 8   CREATININE 0.5 0.5 0.5   CALCIUM 7.0* 6.8* 7.2*     Recent Labs     05/06/22  0430 05/07/22  0600 05/08/22  0548   AST 13 13 11   ALT 6 6 5   BILITOT 0.4 0.4 0.4   ALKPHOS 84 83 89     No results for input(s): INR in the last 72 hours.   No results for input(s): [place] in the last 72 hours. Urinalysis:      Lab Results   Component Value Date    NITRU POSITIVE 04/30/2022    WBCUA PACKED 04/30/2022    BACTERIA MANY 04/30/2022    RBCUA PACKED 04/30/2022    BLOODU LARGE 04/30/2022    SPECGRAV 1.010 04/30/2022    GLUCOSEU Negative 04/30/2022       Radiology:  FL MODIFIED BARIUM SWALLOW W VIDEO   Final Result   Severely limited evaluation due to patient unable to pass contrast media   through the hypopharynx. Please see separate speech pathology report for full discussion of findings   and recommendations. CT HEAD WO CONTRAST   Final Result   No acute intracranial abnormality. Cortical atrophy and periventricular leukomalacia. XR CHEST (2 VW)   Final Result   Cardiomegaly without signs of congestion         CT Head WO Contrast   Final Result   No acute intracranial abnormality. There is age-appropriate atrophy and   small-vessel ischemic disease. CT chest.      There is borderline cardiomegaly. There is mediastinal lipomatosis. There   is mild ectasia of the ascending thoracic aorta measuring 3.7 x 3.5 cm. The   trachea and major bronchi are patent. Nonenlarged mediastinal lymph nodes   are present. There is mild peribronchial thickening concerning for   bronchitis with minimal atelectasis in the lung bases. There is no focal   consolidation or pleural effusion. Liver is of normal architecture. Bilateral adrenal nodules are present with larger 1 on the right side   measuring 2.3 x 3.9 cm degenerative changes are identified in the thoracic   spine. Impression      Mild cardiomegaly with bronchitis and atelectasis in the lung bases. There   is no focal consolidation or edema. Bilateral adrenal nodules. RECOMMENDATIONS:   Unavailable         CT CHEST WO CONTRAST   Final Result   No acute intracranial abnormality. There is age-appropriate atrophy and   small-vessel ischemic disease.       CT chest.      There is borderline cardiomegaly. There is mediastinal lipomatosis. There   is mild ectasia of the ascending thoracic aorta measuring 3.7 x 3.5 cm. The   trachea and major bronchi are patent. Nonenlarged mediastinal lymph nodes   are present. There is mild peribronchial thickening concerning for   bronchitis with minimal atelectasis in the lung bases. There is no focal   consolidation or pleural effusion. Liver is of normal architecture. Bilateral adrenal nodules are present with larger 1 on the right side   measuring 2.3 x 3.9 cm degenerative changes are identified in the thoracic   spine. Impression      Mild cardiomegaly with bronchitis and atelectasis in the lung bases. There   is no focal consolidation or edema. Bilateral adrenal nodules. RECOMMENDATIONS:   Unavailable         XR CHEST PORTABLE   Final Result   Rotational artifact with either artifactual prominence of the right hilum or   a right hilar mass. Recommend follow-up with two view chest radiograph or   chest CT to exclude a neoplastic process. MRI BRAIN WO CONTRAST    (Results Pending)           Assessment/Plan:    Active Hospital Problems    Diagnosis     Acute metabolic encephalopathy [P65.64]      Priority: Medium     Hypotension-due to hypovolemia/sepsis  -BP improved but borderline. Continue IVF. -Critical care was consulted. Patient was given midodrine. Currently NPO thus not getting oral medications,SLP to assess. Recommend echocardiogram.Held lasix. Acute metabolic encephalopathy-likely secondary to urinary tract infection/bacteremia  No improvement  -CT head non acute X 2  -FU MRI brain  -Ammonia levels and ABG wnl  -Continue antibiotic for bacteremia  -Neurology consultation. EEG ordered. ESBL E. coli bacteremia  -ID consulted. Continue ivanz until 5/15  -Urine culture shows mixed katie  -No pneumonia on CT chest    FEN  -SLP consulted. Mentation is affecting evaluation with video swallow. Do not think corpak can be placed if patient cannot swallow. Will consider PEG tube    Hypokalemia  -Monitor and replace    False hyperglycemia  -Labs are being drawn from PICC line with dextrose solution. POCT glucose levels are within range.     Acute on Chronic anemia-suspect bone marrow suppression.  -Hgb <7.0,Transfuse PRBC 5/4. Consent to be obtained from family. Decrease frequency of blood draws.  -Hold Plavix, Lovenox  -FU FOBT  -Folic acid low,started on folic acidosis    Thrombocytopenia-likely due to bacteremia  -Platelets continue trending down. Transfuse as needed. Hold Lovenox. Consulted Oncology. Suspect infection and inflammation. Cytometry pending.     Other medical issues.   CHF  HLD  History of chronic back pain and impaired mobility  Hx of polio  Hx of endometrial ca  Hx of uti in march 2022  Hx of intara abdomin infection  Dec 2021    DVT Prophylaxis: SCDs due to anemia  Diet: Diet NPO  Code Status: Limited    PT/OT Eval Status: as needed    Dispo - Return to SNF in [address] [place], [place] [address]ick Hanna, MD

## 2022-05-08 NOTE — CONSULTS
[facility]  Neurology Consult    Date:  5/8/2022  Patient Name:  Radha Oden  YOB: 1955  MRN: [id number]     PCP:  Harpreet Rodgers MD   Referring:  No ref. provider found      Chief Complaint: altered mental status    History obtained from: chart    Assessment  Radha Oden is a 79 y.o. female with fluctuating mental status over the past week and has had bacteremia, hypotension, anemia, and low folate noted during this admission. Most likely this represents delirium, but will check for alternative etiologies. Plan  · MRI brain w/o contrast  · EEG  · Check homocysteine levels, if elevated consider checking MTHFR        History of Present Illness:  Radha Oden is a 79 y.o. right handed female presenting for evaluation of altered mental status. She is unable to provide any history. She was admitted on 4/30/22 from a nursing home with disorientation. She has a chronic Cobos catheter and was treated for a UTI. She was also noted to have ESBL E coli bacteremia. On 5/1/22 she was noted to be hypotensive for which she was transferred to MICU. On 5/2 she was noted to be awake and alert intermittently following fluid resuscitation. Hem/onc was consulted on 5/4/22 for anemia down to 6.9 on 5/2 prompting transfusion. Folic acid<2 also noted with IV supplements given.            Review of Systems:  Unable to obtain review of symptoms due to altered mental status    Medical History:   Past Medical History:   Diagnosis Date    Acute kidney failure ([address]     Anemia     Anxiety     Cardiomyopathy ([address] 3/24/2014    Chlamydia     Chronic back pain     Depression     Difficulty in walking     Enlarged heart     Essential hypertension     Genital warts     Headache(784.0)     Heart failure ([address]     Hernia 3/2014    Hyperlipidemia     Hypertension     Malignant neoplasm of endometrium (HCC)     Neuropathy     Obesity     Osteoarthritis     Polio     Post traumatic stress disorder     Postmenopausal vaginal bleeding     Urinary incontinence         Surgical History:   Past Surgical History:   Procedure Laterality Date    BREAST SURGERY      left benign cyst    BREAST SURGERY  7/86    CATARACT REMOVAL      COLONOSCOPY  3/1/2014    DILATION AND CURETTAGE OF UTERUS  04/29/2014    EYE SURGERY  9/2003 , 4/2014    HYSTEROSCOPY  04/29/2014    WISDOM TOOTH EXTRACTION          Family History:   Family History   Problem Relation Age of Onset    Cancer Mother         ovarian cancer    Heart Disease Mother         CHF    Other Mother         Hepatitis    Arthritis Mother     Diabetes Mother     High Blood Pressure Mother     High Cholesterol Mother     Kidney Disease Mother     Obesity Mother     Diabetes Father     Other Father         Cirrohis of liver and Black Lung    Alcohol Abuse Father     Arthritis Father     High Blood Pressure Father     High Cholesterol Father     Obesity Father     Arthritis Brother     High Blood Pressure Brother     High Cholesterol Brother     Arthritis Maternal Aunt     High Blood Pressure Maternal Aunt     Stroke Maternal Aunt     Arthritis Maternal Uncle     Heart Disease Maternal Uncle     Stroke Maternal Uncle     Arthritis Paternal Uncle     High Blood Pressure Paternal Uncle     High Cholesterol Paternal Uncle     Stroke Paternal Uncle     Diabetes Maternal Grandmother     Heart Disease Maternal Grandmother     Diabetes Paternal Grandmother        Social History:  Social History     Tobacco Use    Smoking status: Never Smoker    Smokeless tobacco: Never Used   Substance Use Topics    Alcohol use: No     Comment: rarely    Drug use: No        Current Medications:      Current Facility-Administered Medications   Medication Dose Route Frequency Provider Last Rate Last Admin    dextrose 5 % and 0.45 % sodium chloride infusion   IntraVENous Continuous Hazel Morse MD 75 mL/hr at 05/08/22 0512 New Bag at 05/08/22 [address] white petrolatum ointment   Topical BID Ariela Betancourt MD   Given at 05/08/22 0917    white petrolatum ointment   Topical TID PRN Ariela Betancourt MD        0.9 % sodium chloride infusion   IntraVENous PRN Glenda Nicolas DO        midodrine (PROAMATINE) tablet 5 mg  5 mg Oral TID WC Ariela Betancourt MD        acetaminophen (TYLENOL) tablet 650 mg  650 mg Oral Q6H PRN Jhoan Morin MD        Or    acetaminophen (TYLENOL) suppository 650 mg  650 mg Rectal Q6H PRN Jhoan Morin MD        sodium chloride flush 0.9 % injection 5-40 mL  5-40 mL IntraVENous 2 times per day Dionna Friend MD   10 mL at 05/08/22 0918    sodium chloride flush 0.9 % injection 5-40 mL  5-40 mL IntraVENous PRN Dionna Friend MD        0.9 % sodium chloride infusion   IntraVENous PRN Dionna Friend MD        heparin flush 100 UNIT/ML injection 100 Units  1 mL IntraVENous 2 times per day Dionna Friend MD   100 Units at 05/08/22 0918    heparin flush 100 UNIT/ML injection 100 Units  1 mL IntraCATHeter PRN Kvng Johnson MD        sodium chloride flush 0.9 % injection 5-40 mL  5-40 mL IntraVENous 2 times per day Cecil Beltran MD   10 mL at 05/07/22 1107    sodium chloride flush 0.9 % injection 5-40 mL  5-40 mL IntraVENous PRN Jayme Luong MD        0.9 % sodium chloride infusion   IntraVENous PRN Jayme Luong MD        heparin flush 100 UNIT/ML injection 300 Units  3 mL IntraVENous 2 times per day Cecil Beltran MD   300 Units at 05/06/22 2137    heparin flush 100 UNIT/ML injection 300 Units  3 mL IntraCATHeter PRN Jayme Luong MD        perflutren lipid microspheres (DEFINITY) injection 1.65 mg  1.5 mL IntraVENous ONCE PRN Cat Mcdaniel DO        folic acid injection 1 mg  1 mg IntraVENous Daily Juana Schmidt MD   1 mg at 05/08/22 0915    pantoprazole (PROTONIX) injection 40 mg  40 mg IntraVENous BID Stepan Mcgee MD   40 mg at 05/08/22 0915    ertapenem Zeyad Choi) 1000 mg IVPB minibag  1,000 mg IntraVENous Q24H Ancelmo Thornton MD   Stopped at 05/07/22 1420    [Held by provider] clopidogrel (PLAVIX) tablet 75 mg  75 mg Oral Daily Ivana Jimenez MD   75 mg at 05/01/22 0838    vitamin D (ERGOCALCIFEROL) capsule 50,000 Units  50,000 Units Oral Once per day on Thu Ivana Jimenez MD        [Held by provider] ferrous sulfate (IRON 325) tablet 325 mg  325 mg Oral Every Other Day Ivana Jimenez MD   325 mg at 04/30/22 2126    fluticasone (FLONASE) 50 MCG/ACT nasal spray 2 spray  2 spray Each Nostril Daily Ivana Jimenez MD   2 spray at 05/08/22 0917    [Held by provider] furosemide (LASIX) tablet 40 mg  40 mg Oral Daily Ivana Jimenez MD   40 mg at 05/01/22 0837    [Held by provider] magnesium oxide (MAG-OX) tablet 400 mg  400 mg Oral Daily Ivana Jimenez MD   400 mg at 05/01/22 0838    [Held by provider] oxybutynin (DITROPAN-XL) extended release tablet 10 mg  10 mg Oral Daily Ivana Jimenez MD   10 mg at 05/01/22 0838    [Held by provider] potassium bicarb-citric acid (EFFER-K) effervescent tablet 20 mEq  20 mEq Oral Daily Ivana Jimenez MD   20 mEq at 05/01/22 0838    sertraline (ZOLOFT) tablet 50 mg  50 mg Oral Daily Maximus Littlejohn MD   50 mg at 05/01/22 0838    atorvastatin (LIPITOR) tablet 10 mg  10 mg Oral Daily Ivana Jimenez MD   10 mg at 05/01/22 0837    [Held by provider] trospium (SANCTURA) tablet 20 mg  20 mg Oral Nightly Ivana Jimenez MD   20 mg at 04/30/22 2126    [Held by provider] enoxaparin (LOVENOX) injection 40 mg  40 mg SubCUTAneous Daily Ivana Jimenez MD   40 mg at 05/02/22 0917    polyethylene glycol (GLYCOLAX) packet 17 g  17 g Oral Daily PRN Ivana Jimenez MD        ipratropium-albuterol (DUONEB) nebulizer solution 1 ampule  1 ampule Inhalation Q4H PRN Ivana Jimenez MD            Allergies:      No Known Allergies     Physical Examination  Vitals   Vitals:    05/08/22 0000 05/08/22 0515 05/08/22 0813 05/08/22 1430   BP: 121/71  100/60 108/64   Pulse: 93  100 98   Resp: 16  16 16   Temp: 97.3 °F (36.3 °C)  96.9 °F (36.1 °C) 97 °F (36.1 °C)   TempSrc: Temporal  Temporal Temporal   SpO2: 95%   100%   Weight:  228 lb 1 oz (103.4 kg)     Height:            General: Patient appears in no acute distress. Stuporous. HEENT: Normocephalic, atraumatic  Chest: diminished BL  Heart: No murmurs appreciated  Extremities/Peripheral vascular: No edema/swelling noted. No cold limbs noted. Neurologic Examination    Mental Status  Alert, and oriented to person, place and time. Speech is fluent with intact comprehension. No evidence of memory impairment. Attention and concentration appeared normal.     Cranial Nerves  II. Visual fields full to threat bilaterally. Fundoscopic exam: Unable to perform due to lack of patient cooperation. III, IV, VI: Pupils equally round and reactive to light, 3 to 2 mm bilaterally. EOMs: full, no nystagmus. V. Facial sensation intact to light touch bilaterally  VII: Facial movements symmetric and strong  VIII: Hearing intact to voice  IX,X: Palate elevates symmetrically.  No dysarthria  XI: Sternocleidomastoid and trapezius 5/5 bilaterally   XII: Tongue is midline    Motor  Withdraws weakly, minimally in BL UEs, no withdrawal in BL LEs    Tone: decreased    Sensation  · Grimaces to noxious stimulation    Reflexes     Right Left   Biceps 1 1   Brachioradialis 1 1   Patellar 0 0   Achilles 0 0   ankle clonus none none   Babinski absent absent     Coordination  No resting tremors observed    Gait  Deferred for safety/fall consideration      Labs  Recent Labs     05/08/22  0548 05/08/22  0548 05/08/22  1225     --   --    K 3.5  --   --    *  --   --    CO2 22  --   --    BUN 8  --   --    CREATININE 0.5  --   --    GLUCOSE 141*  --   --    CALCIUM 7.2*  --   --    PROT 4.2*  --   --    LABALBU 1.7*  --   --    BILITOT 0.4  --   --    ALKPHOS 89  --   --    AST 11  --   --    ALT 5  --   --    WBC 9.8  --   --    RBC 2.90*  --   --    HGB 8.3* < > 7.9*   HCT 26.1*   < > 24.4*   MCV 90.0  --   --    MCH 28.6  --   --    MCHC 31.8*  --   --    RDW 17.3*  --   --    PLT 29*  --   --    MPV NOT CALC  --   --     < > = values in this interval not displayed. Imaging  FL MODIFIED BARIUM SWALLOW W VIDEO   Final Result   Severely limited evaluation due to patient unable to pass contrast media   through the hypopharynx. Please see separate speech pathology report for full discussion of findings   and recommendations. CT HEAD WO CONTRAST   Final Result   No acute intracranial abnormality. Cortical atrophy and periventricular leukomalacia. XR CHEST (2 VW)   Final Result   Cardiomegaly without signs of congestion         CT Head WO Contrast   Final Result   No acute intracranial abnormality. There is age-appropriate atrophy and   small-vessel ischemic disease. CT chest.      There is borderline cardiomegaly. There is mediastinal lipomatosis. There   is mild ectasia of the ascending thoracic aorta measuring 3.7 x 3.5 cm. The   trachea and major bronchi are patent. Nonenlarged mediastinal lymph nodes   are present. There is mild peribronchial thickening concerning for   bronchitis with minimal atelectasis in the lung bases. There is no focal   consolidation or pleural effusion. Liver is of normal architecture. Bilateral adrenal nodules are present with larger 1 on the right side   measuring 2.3 x 3.9 cm degenerative changes are identified in the thoracic   spine. Impression      Mild cardiomegaly with bronchitis and atelectasis in the lung bases. There   is no focal consolidation or edema. Bilateral adrenal nodules. RECOMMENDATIONS:   Unavailable         CT CHEST WO CONTRAST   Final Result   No acute intracranial abnormality. There is age-appropriate atrophy and   small-vessel ischemic disease. CT chest.      There is borderline cardiomegaly. There is mediastinal lipomatosis.   There   is mild ectasia of the ascending thoracic aorta measuring 3.7 x 3.5 cm. The   trachea and major bronchi are patent. Nonenlarged mediastinal lymph nodes   are present. There is mild peribronchial thickening concerning for   bronchitis with minimal atelectasis in the lung bases. There is no focal   consolidation or pleural effusion. Liver is of normal architecture. Bilateral adrenal nodules are present with larger 1 on the right side   measuring 2.3 x 3.9 cm degenerative changes are identified in the thoracic   spine. Impression      Mild cardiomegaly with bronchitis and atelectasis in the lung bases. There   is no focal consolidation or edema. Bilateral adrenal nodules. RECOMMENDATIONS:   Unavailable         XR CHEST PORTABLE   Final Result   Rotational artifact with either artifactual prominence of the right hilum or   a right hilar mass. Recommend follow-up with two view chest radiograph or   chest CT to exclude a neoplastic process.          MRI BRAIN WO CONTRAST    (Results Pending)           Electronically signed by Chin Louis DO on 5/8/2022 at 2:49 PM

## 2022-05-09 NOTE — CARE COORDINATION
Chart reviewed and case discussed with Neurology and Dr Rafal Aguilera. New consults for palliative care consult and general surgery. EEG of the head pending. MRI of the brain ordered, however unable to complete the check list.  Neuro to review and determine MRI vs CT. IV Invanz until 5/15. NPO, failed video swallow 5/6. Patient admitted from [facility] the [facility]. Patient can return when medically stable, no precert needed to return. Ambulance form and transfer paperwork in the soft chart. Will continue to follow.      Mauricio Soulier, RN.  [facility]  [phone]

## 2022-05-09 NOTE — CONSULTS
Palliative Care Department  [phone]  Palliative Care Initial Consult  Yelena FERGUSON-CNS, [facility]    Ricardo Mccarthy  [id number]  Hospital Day: 10    Date of Initial Consult: 5/9/22  Referring Provider: Dr. Ines Levin was consulted for assistance with: goals of care and code discussion    CHIEF COMPLAINT: Altered mental status    HPI:   Ricardo Mccarthy is a 79 y.o. with a past medical history of Acute kidney failure, anemia, anxiety, cardiomyopathy, chronic back pain, depression, hypertension, hyperlipidemia, malignant neoplasm of endometrium, neuropathy, obesity, osteoarthritis, Polio, post traumatic stress disorder and chronic lizama catheter who presented to the emergency department by EMS from local nursing facility for evaluation of altered mental status. Workup noted ESBL E coli bacteremia; UTI; white count of 19.5. She was started on Rocephin. She was admitted on 4/30/2022 for further care. Neurology was consulted. CT of head noting small vessel ischemic disease-no acute pathology. On 5/1/22, pt was hypotensive requiring transfer to MICU. On 5/2, she was awake and alert intermittently following fluid resuscitation. Hem/onc was consulted on 5/4/22 for anemia down to 6.9 on 5/2 prompting transfusion. MRI and EEG are still pending. Pt has been unresponsive now for several days. ASSESSMENT/PLAN:     Pertinent Hospital Diagnoses    Acute metabolic encephalopathy- MRI and EEG results pending; Neurology following  · New onset anemia and thrombocytopenia - Hem/Onc following  · ESBL E. coli bacteremia- ID following  · UTI-continues on antibiotics  · Hx of Endometrial cancer      Palliative Care Encounter / Counseling Regarding Goals of Care   Ricardo Mccarthy, Does Not have capacity for medical decision-making.   Capacity is time limited and situation/question specific   During encounter no surrogate medical decision-maker present   Outcome of goals of care meeting: unable to make contact with cousin; continue current care   Code status limited; NO CPR; NO INTUBATION; ok for shock and meds   Advanced Directives: no HPOA or Living Will noted in chart   Surrogate/Legal NOK:   Jordan Mathur @ Home Phone: [phone]    Unable to get additional contacts from [facility] or [address] per     Spiritual assessment: no spiritual distress identified  Bereavement and grief: to be determined  Referrals to: none today    SUBJECTIVE:     Details of Conversation:     5/9/22 Chart reviewed. Pt seen. SBP . Pt remains unresponsive and didn't respond to painful stimuli during visit. Is undergoing EEG at present. No family present. Attempted to make contact with cousin Aracelis Sherman remains with busy signal. Spoke with  and they have been unable to make contact either. SLP recommending NPO status since video swallow wasn't able to be performed. Will follow along and assist as needed. If unable to make contact with family; may need to consider using 2 physicians on making further recommendations regarding treatment plan of care. Awaiting EEG results and further information to determine cause of altered mental status.           OBJECTIVE:   Prognosis: unknown    Physical Exam:  BP (!) 84/49   Pulse 103   Temp 96.8 °F (36 °C) (Temporal)   Resp 20   Ht 5' 2\" (1.575 m)   Wt 226 lb 2 oz (102.6 kg)   SpO2 100%   BMI 41.36 kg/m²   Gen:   NAD, responds to pain  HEENT:  Normocephalic, atraumatic, mucosa moist, EOMI  Neck:  Supple, trachea midline, no JVD  Lungs:  CTA bilaterally, no audible rhonchi or wheezes noted, respirations unlabored  Heart:  NSR per monitor; RRR, distant heart tones, no murmur, rub, or gallop noted during exam  Abd:lizama catheter in place  Ext:  Moving all extremities, no edema, pulses present  Skin:  Warm and dry  Neuro:  PERRL, responds to pain following commands    Past Medical History:   Diagnosis Date    Acute kidney failure (HCC)     Anemia     Anxiety     Cardiomyopathy ([facility] [address] 3/24/2014    Chlamydia     Chronic back pain     Depression     Difficulty in walking     Enlarged heart     Essential hypertension     Genital warts     Headache(784.0)     Heart failure ([address]     Hernia 3/2014    Hyperlipidemia     Hypertension     Malignant neoplasm of endometrium (HCC)     Neuropathy     Obesity     Osteoarthritis     Polio     Post traumatic stress disorder     Postmenopausal vaginal bleeding     Urinary incontinence      Past Surgical History:   Procedure Laterality Date    BREAST SURGERY      left benign cyst    BREAST SURGERY  7/86    CATARACT REMOVAL      COLONOSCOPY  3/1/2014    DILATION AND CURETTAGE OF UTERUS  04/29/2014    EYE SURGERY  9/2003 , 4/2014    HYSTEROSCOPY  04/29/2014    WISDOM TOOTH EXTRACTION       Active Hospital Problems    Diagnosis Date Noted    Acute metabolic encephalopathy [I27.45] 04/30/2022     Priority: Medium       Social History:   The patient currently lives in local nursing home  TOBACCO:  reports that she has never smoked. She has never used smokeless tobacco.  ETOH:  reports no history of alcohol use. Objective data reviewed: labs, images, records, medication use, vitals and chart    Discussed patient and the plan of care with the other IDT members: Palliative Medicine IDT Team and Floor Nurse    Time/Communication  Greater than 50% of time spent, total 25 minutes in counseling and coordination of care at the bedside regarding goals of care, symptom management, diagnosis and prognosis and see above. Thank you for allowing Palliative Medicine to participate in the care of Narciso Soares.   Laura FERGUSON-CNS, TOREY

## 2022-05-09 NOTE — CONSULTS
GENERAL SURGERY  CONSULT NOTE  5/9/2022    Physician Consulted: Dr. Wilbert Butler   Reason for Consult: PEG tube placement     HPI  Andrea Cleary is a 79 y.o. female who presents for evaluation of altered mental status and disorientation secondary to septic shock from a UTI infection. Patient will wake up to voice but is nonverbal and is not currently following commands. She is severely altered which has been pretty consistent throughout her hospital stay. A barium video swallow study was ordered however due to noncompliance and the severe altered state of the patient the study was not able to be performed. 2 CT head imagings were performed that showed no acute pathology. Neurology has been consulted. Past Medical History:   Diagnosis Date    Acute kidney failure ([address]     Anemia     Anxiety     Cardiomyopathy ([address] 3/24/2014    Chlamydia     Chronic back pain     Depression     Difficulty in walking     Enlarged heart     Essential hypertension     Genital warts     Headache(784.0)     Heart failure ([address]     Hernia 3/2014    Hyperlipidemia     Hypertension     Malignant neoplasm of endometrium (HCC)     Neuropathy     Obesity     Osteoarthritis     Polio     Post traumatic stress disorder     Postmenopausal vaginal bleeding     Urinary incontinence        Past Surgical History:   Procedure Laterality Date    BREAST SURGERY      left benign cyst    BREAST SURGERY  7/86    CATARACT REMOVAL      COLONOSCOPY  3/1/2014    DILATION AND CURETTAGE OF UTERUS  04/29/2014    EYE SURGERY  9/2003 , 4/2014    HYSTEROSCOPY  04/29/2014    WISDOM TOOTH EXTRACTION         Medications Prior to Admission:    Prior to Admission medications    Medication Sig Start Date End Date Taking?  Authorizing Provider   mirtazapine (REMERON) 15 MG tablet Take 7.5 mg by mouth nightly    Historical Provider, MD   fluticasone (FLONASE) 50 MCG/ACT nasal spray 2 sprays by Each Nostril route daily    Historical Provider, MD cyanocobalamin 1000 MCG/ML injection Inject 1,000 mcg into the muscle every 30 days    Historical Provider, MD   melatonin 3 MG TABS tablet Take 6 mg by mouth at bedtime    Historical Provider, MD   ergocalciferol (ERGOCALCIFEROL) 1.25 MG (21770 UT) capsule Take 50,000 Units by mouth once a week Thursday    Historical Provider, MD   magnesium oxide (MAG-OX) 400 MG tablet Take 400 mg by mouth daily    Historical Provider, MD   ferrous sulfate (IRON 325) 325 (65 Fe) MG tablet Take 325 mg by mouth every other day    Historical Provider, MD   Sodium Phosphates (FLEET) 7-19 GM/118ML Place 1 enema rectally once as needed    Historical Provider, MD   magnesium hydroxide (MILK OF MAGNESIA) 400 MG/5ML suspension Take by mouth daily as needed for Constipation    Historical Provider, MD   furosemide (LASIX) 40 MG tablet Take 40 mg by mouth daily    Historical Provider, MD   oxybutynin (DITROPAN-XL) 10 MG extended release tablet Take 10 mg by mouth daily    Historical Provider, MD   potassium chloride (KLOR-CON) 20 MEQ packet Take 20 mEq by mouth daily    Historical Provider, MD   pantoprazole sodium (PROTONIX) 40 MG PACK packet Take 40 mg by mouth every morning (before breakfast)    Historical Provider, MD   senna (SENOKOT) 8.6 MG tablet Take 1 tablet by mouth daily    Historical Provider, MD   simvastatin (ZOCOR) 10 MG tablet Take 10 mg by mouth nightly    Historical Provider, MD   acetaminophen 650 MG TABS Take 650 mg by mouth every 4 hours as needed. 2/10/15   Lorraine Phoenix DO   clopidogrel (PLAVIX) 75 MG tablet Take 1 tablet by mouth daily. 2/10/15   Lorraine Phoenix DO   sertraline (ZOLOFT) 50 MG tablet Take 1 tablet by mouth daily. 10/7/14   Jorden Lesch, DO   tolterodine (DETROL LA) 4 MG ER capsule Take 1 capsule by mouth daily. 10/7/14   Jorden Lesch, DO   FIBER SELECT GUMMIES PO Take  by mouth.  Two gummies qam    Historical Provider, MD       No Known Allergies    Family History   Problem Relation Age of Onset    Cancer Mother         ovarian cancer    Heart Disease Mother         CHF   [place] Other Mother         Hepatitis    Arthritis Mother     Diabetes Mother     High Blood Pressure Mother     High Cholesterol Mother     Kidney Disease Mother     Obesity Mother     Diabetes Father     Other Father         Cirrohis of liver and Black Lung    Alcohol Abuse Father     Arthritis Father     High Blood Pressure Father     High Cholesterol Father     Obesity Father     Arthritis Brother     High Blood Pressure Brother     High Cholesterol Brother     Arthritis Maternal Aunt     High Blood Pressure Maternal Aunt     Stroke Maternal Aunt     Arthritis Maternal Uncle     Heart Disease Maternal Uncle     Stroke Maternal Uncle     Arthritis Paternal Uncle     High Blood Pressure Paternal Uncle     High Cholesterol Paternal Uncle     Stroke Paternal Uncle     Diabetes Maternal Grandmother     Heart Disease Maternal Grandmother     Diabetes Paternal Grandmother        Social History     Tobacco Use    Smoking status: Never Smoker    Smokeless tobacco: Never Used   Substance Use Topics    Alcohol use: No     Comment: rarely    Drug use: No         Review of Systems   Unable to assess secondary to severe altered state    PHYSICAL EXAM:    Vitals:    05/09/22 1100   BP: (!) 84/49   Pulse: 103   Resp: 20   Temp: 96.8 °F (36 °C)   SpO2: 100%       General Appearance: Arousable to voice, severely altered  Skin:  Skin color, texture, turgor normal. No rashes or lesions. Head/face:  NCAT  Eyes:  PERRL  Lungs:  No chest wall tenderness. Heart:  Heart regular rate and rhythm  Abdomen:  Soft, non-tender, normal bowel sounds. No bruits, organomegaly or masses.   Extremities: pulses present in all extremities      LABS:    CBC  Recent Labs     05/09/22  0600   WBC 8.4   HGB 7.8*   HCT 24.1*   PLT 29*     BMP  Recent Labs     05/09/22  0600   *   K 3.1*   *   CO2 24   BUN 7   CREATININE 0. 5   CALCIUM 7.0*     Liver Function  Recent Labs     05/09/22  0600   BILITOT 0.3   AST 9   ALT 5   ALKPHOS 88   PROT 4.2*   LABALBU 1.8*     No results for input(s): LACTATE in the last 72 hours. No results for input(s): INR, PTT in the last 72 hours. Invalid input(s): PT    RADIOLOGY    XR CHEST (2 VW)    Result Date: 4/30/2022  EXAMINATION: TWO XRAY VIEWS OF THE CHEST 4/30/2022 6:53 pm COMPARISON: None. HISTORY: ORDERING SYSTEM PROVIDED HISTORY: abonormal cxr . hilar mass? TECHNOLOGIST PROVIDED HISTORY: Reason for exam:->abonormal cxr . hilar mass? What reading provider will be dictating this exam?->CRC FINDINGS: Frontal and lateral views of chest were obtained. No focal parenchymal densities are seen. The heart is prominent size. The pulmonary vasculature appears within normal range. There are no signs of a significant effusion or consolidation. Note is made of healed right rib trauma. Cardiomegaly without signs of congestion     CT Head WO Contrast    Result Date: 4/30/2022  EXAMINATION: CT OF THE HEAD WITHOUT CONTRAST; CT OF THE CHEST WITHOUT CONTRAST  4/30/2022 4:32 pm TECHNIQUE: CT of the head was performed without the administration of intravenous contrast. Dose modulation, iterative reconstruction, and/or weight based adjustment of the mA/kV was utilized to reduce the radiation dose to as low as reasonably achievable.; CT of the chest was performed without the administration of intravenous contrast. Multiplanar reformatted images are provided for review. Dose modulation, iterative reconstruction, and/or weight based adjustment of the mA/kV was utilized to reduce the radiation dose to as low as reasonably achievable. COMPARISON: 07/24/2018 HISTORY: ORDERING SYSTEM PROVIDED HISTORY: [facility] TECHNOLOGIST PROVIDED HISTORY: Reason for exam:->AMS Has a \"code stroke\" or \"stroke alert\" been called? ->No Decision Support Exception - unselect if not a suspected or confirmed emergency medical condition->Emergency Medical Condition (MA) What reading provider will be dictating this exam?->CRC FINDINGS: BRAIN/VENTRICLES: There is no acute intracranial hemorrhage, mass effect or midline shift. No abnormal extra-axial fluid collection. The gray-white differentiation is maintained without evidence of an acute infarct. There is no evidence of hydrocephalus. ORBITS: The visualized portion of the orbits demonstrate no acute abnormality. SINUSES: The visualized paranasal sinuses and mastoid air cells demonstrate no acute abnormality. SOFT TISSUES/SKULL:  No acute abnormality of the visualized skull or soft tissues. No acute intracranial abnormality. There is age-appropriate atrophy and small-vessel ischemic disease. CT chest. There is borderline cardiomegaly. There is mediastinal lipomatosis. There is mild ectasia of the ascending thoracic aorta measuring 3.7 x 3.5 cm. The trachea and major bronchi are patent. Nonenlarged mediastinal lymph nodes are present. There is mild peribronchial thickening concerning for bronchitis with minimal atelectasis in the lung bases. There is no focal consolidation or pleural effusion. Liver is of normal architecture. Bilateral adrenal nodules are present with larger 1 on the right side measuring 2.3 x 3.9 cm degenerative changes are identified in the thoracic spine. Impression Mild cardiomegaly with bronchitis and atelectasis in the lung bases. There is no focal consolidation or edema. Bilateral adrenal nodules.  RECOMMENDATIONS: Unavailable     CT CHEST WO CONTRAST    Result Date: 4/30/2022  EXAMINATION: CT OF THE HEAD WITHOUT CONTRAST; CT OF THE CHEST WITHOUT CONTRAST  4/30/2022 4:32 pm TECHNIQUE: CT of the head was performed without the administration of intravenous contrast. Dose modulation, iterative reconstruction, and/or weight based adjustment of the mA/kV was utilized to reduce the radiation dose to as low as reasonably achievable.; CT of the chest was performed without the administration of intravenous contrast. Multiplanar reformatted images are provided for review. Dose modulation, iterative reconstruction, and/or weight based adjustment of the mA/kV was utilized to reduce the radiation dose to as low as reasonably achievable. COMPARISON: 07/24/2018 HISTORY: ORDERING SYSTEM PROVIDED HISTORY: AMS TECHNOLOGIST PROVIDED HISTORY: Reason for exam:->AMS Has a \"code stroke\" or \"stroke alert\" been called? ->No Decision Support Exception - unselect if not a suspected or confirmed emergency medical condition->Emergency Medical Condition (MA) What reading provider will be dictating this exam?->CRC FINDINGS: BRAIN/VENTRICLES: There is no acute intracranial hemorrhage, mass effect or midline shift. No abnormal extra-axial fluid collection. The gray-white differentiation is maintained without evidence of an acute infarct. There is no evidence of hydrocephalus. ORBITS: The visualized portion of the orbits demonstrate no acute abnormality. SINUSES: The visualized paranasal sinuses and mastoid air cells demonstrate no acute abnormality. SOFT TISSUES/SKULL:  No acute abnormality of the visualized skull or soft tissues. No acute intracranial abnormality. There is age-appropriate atrophy and small-vessel ischemic disease. CT chest. There is borderline cardiomegaly. There is mediastinal lipomatosis. There is mild ectasia of the ascending thoracic aorta measuring 3.7 x 3.5 cm. The trachea and major bronchi are patent. Nonenlarged mediastinal lymph nodes are present. There is mild peribronchial thickening concerning for bronchitis with minimal atelectasis in the lung bases. There is no focal consolidation or pleural effusion. Liver is of normal architecture. Bilateral adrenal nodules are present with larger 1 on the right side measuring 2.3 x 3.9 cm degenerative changes are identified in the thoracic spine.  Impression Mild cardiomegaly with bronchitis and atelectasis in the lung bases. There is no focal consolidation or edema. Bilateral adrenal nodules. RECOMMENDATIONS: Unavailable     XR CHEST PORTABLE    Result Date: 4/30/2022  EXAMINATION: ONE X-RAY VIEW OF THE CHEST 4/30/2022 2:43 pm COMPARISON: February 7, 2015 HISTORY: ORDERING SYSTEM PROVIDED HISTORY:  [place]fusion TECHNOLOGIST PROVIDED HISTORY: Reason for Exam:  Confusion What reading provider will be dictating this exam?  CRC FINDINGS: The examination is limited due to patient rotation. There is either prominence of the right hilum secondary to rotation or potentially a perihilar mass. Otherwise, no focal airspace opacity. No pneumothorax. Difficult to exclude a small left pleural effusion. Bones are osteopenic. No acute osseous abnormality. Rotational artifact with either artifactual prominence of the right hilum or a right hilar mass. Recommend follow-up with two view chest radiograph or chest CT to exclude a neoplastic process. ASSESSMENT:  79 y.o. female with acute metabolic encephalopathy secondary to septic shock due to urinary tract infection/bacteremia    PLAN:  Will await result of MRI and EEG  although encephalopathy is acute and should resolve, PEG tube maybe needed for placement purposes.   Will discuss with POA  Continue NPO  Discussed with Dr. Bharati Hernandez    Electronically signed by Ankita Ramírez DO on 5/9/22 at 11:57 AM EDT

## 2022-05-09 NOTE — PROGRESS NOTES
Hospitalist Progress Note      PCP: Devan Joseph MD    Date of Admission: 4/30/2022    Hospital Course:   \" 59-year-old nursing home resident with chronic lizama ,presents with altered mental status. EMS reports nursing home states she was unresponsive except to pain and was disoriented. Patient was found to have inner tract infection in the ED and she was started on Rocephin. complicated by hypotension requiring ICU stay. \"       Subjective:    Pt is lethargic. Not able to give history.     Medications:  Reviewed    Infusion Medications    dextrose 5 % and 0.45 % NaCl 75 mL/hr at 05/08/22 0651    sodium chloride      sodium chloride      sodium chloride       Scheduled Medications    zinc sulfate  50 mg Oral Daily    white petrolatum   Topical BID    midodrine  5 mg Oral TID WC    sodium chloride flush  5-40 mL IntraVENous 2 times per day    heparin flush  1 mL IntraVENous 2 times per day    sodium chloride flush  5-40 mL IntraVENous 2 times per day    heparin flush  3 mL IntraVENous 2 times per day    folic acid  1 mg IntraVENous Daily    pantoprazole  40 mg IntraVENous BID    ertapenem (INVanz) IVPB  1,000 mg IntraVENous Q24H    [Held by provider] clopidogrel  75 mg Oral Daily    vitamin D  50,000 Units Oral Once per day on Thu    [Held by provider] ferrous sulfate  325 mg Oral Every Other Day    fluticasone  2 spray Each Nostril Daily    [Held by provider] furosemide  40 mg Oral Daily    [Held by provider] magnesium oxide  400 mg Oral Daily    [Held by provider] oxybutynin  10 mg Oral Daily    [Held by provider] potassium bicarb-citric acid  20 mEq Oral Daily    sertraline  50 mg Oral Daily    atorvastatin  10 mg Oral Daily    [Held by provider] trospium  20 mg Oral Nightly    [Held by provider] enoxaparin  40 mg SubCUTAneous Daily     PRN Meds: white petrolatum, sodium chloride, acetaminophen **OR** acetaminophen, sodium chloride flush, sodium chloride, heparin flush, sodium chloride flush, sodium chloride, heparin flush, perflutren lipid microspheres, polyethylene glycol, ipratropium-albuterol      Intake/Output Summary (Last 24 hours) at 5/9/2022 1134  Last data filed at 5/9/2022 0606  Gross per 24 hour   Intake 2000.89 ml   Output 950 ml   Net 1050.89 ml       Physical Exam Performed:    BP (!) 84/49   Pulse 103   Temp 96.8 °F (36 °C) (Temporal)   Resp 20   Ht 5' 2\" (1.575 m)   Wt 226 lb 2 oz (102.6 kg)   SpO2 100%   BMI 41.36 kg/m²     General appearance: No apparent distress, appears stated age. Lethargic. HEENT: Pupils equal, round, and reactive to light. Conjunctivae/corneas clear. Neck: Supple, with full range of motion. No jugular venous distention. Trachea midline. Respiratory:  Normal respiratory effort. Clear to auscultation, bilaterally without Rales/Wheezes/Rhonchi. Cardiovascular: Regular rate and rhythm with normal S1/S2 without murmurs, rubs or gallops. Abdomen: Soft, non-tender, non-distended with normal bowel sounds. Musculoskeletal: No clubbing, cyanosis or edema bilaterally. Full range of motion without deformity. Skin: Skin color, texture, turgor normal.  No rashes or lesions. Neurologic:  Moving ext  Psychiatric: lethargic      Labs:   Recent Labs     05/07/22  0600 05/07/22  1644 05/08/22  0548 05/08/22  1225 05/08/22  1610 05/09/22  0120 05/09/22  0600   WBC 8.4  --  9.8  --   --   --  8.4   HGB 7.9*   < > 8.3*   < > 7.8* 8.0* 7.8*   HCT 24.3*   < > 26.1*   < > 23.9* 24.8* 24.1*   PLT 26*  --  29*  --   --   --  29*    < > = values in this interval not displayed. Recent Labs     05/07/22  0600 05/08/22  0548 05/09/22  0600    146 147*   K 4.9 3.5 3.1*   * 114* 114*   CO2 20* 22 24   BUN 8 8 7   CREATININE 0.5 0.5 0.5   CALCIUM 6.8* 7.2* 7.0*     Recent Labs     05/07/22  0600 05/08/22  0548 05/09/22  0600   AST 13 11 9   ALT 6 5 5   BILITOT 0.4 0.4 0.3   ALKPHOS 83 89 88     No results for input(s): INR in the last 72 hours.   No results for input(s): Ines Lopez in the last 72 hours. Urinalysis:      Lab Results   Component Value Date    NITRU POSITIVE 04/30/2022    WBCUA PACKED 04/30/2022    BACTERIA MANY 04/30/2022    RBCUA PACKED 04/30/2022    BLOODU LARGE 04/30/2022    SPECGRAV 1.010 04/30/2022    GLUCOSEU Negative 04/30/2022       Radiology:  FL MODIFIED BARIUM SWALLOW W VIDEO   Final Result   Severely limited evaluation due to patient unable to pass contrast media   through the hypopharynx. Please see separate speech pathology report for full discussion of findings   and recommendations. CT HEAD WO CONTRAST   Final Result   No acute intracranial abnormality. Cortical atrophy and periventricular leukomalacia. XR CHEST (2 VW)   Final Result   Cardiomegaly without signs of congestion         CT Head WO Contrast   Final Result   No acute intracranial abnormality. There is age-appropriate atrophy and   small-vessel ischemic disease. CT chest.      There is borderline cardiomegaly. There is mediastinal lipomatosis. There   is mild ectasia of the ascending thoracic aorta measuring 3.7 x 3.5 cm. The   trachea and major bronchi are patent. Nonenlarged mediastinal lymph nodes   are present. There is mild peribronchial thickening concerning for   bronchitis with minimal atelectasis in the lung bases. There is no focal   consolidation or pleural effusion. Liver is of normal architecture. Bilateral adrenal nodules are present with larger 1 on the right side   measuring 2.3 x 3.9 cm degenerative changes are identified in the thoracic   spine. Impression      Mild cardiomegaly with bronchitis and atelectasis in the lung bases. There   is no focal consolidation or edema. Bilateral adrenal nodules. RECOMMENDATIONS:   Unavailable         CT CHEST WO CONTRAST   Final Result   No acute intracranial abnormality. There is age-appropriate atrophy and   small-vessel ischemic disease.       CT chest.      There is borderline cardiomegaly. There is mediastinal lipomatosis. There   is mild ectasia of the ascending thoracic aorta measuring 3.7 x 3.5 cm. The   trachea and major bronchi are patent. Nonenlarged mediastinal lymph nodes   are present. There is mild peribronchial thickening concerning for   bronchitis with minimal atelectasis in the lung bases. There is no focal   consolidation or pleural effusion. Liver is of normal architecture. Bilateral adrenal nodules are present with larger 1 on the right side   measuring 2.3 x 3.9 cm degenerative changes are identified in the thoracic   spine. Impression      Mild cardiomegaly with bronchitis and atelectasis in the lung bases. There   is no focal consolidation or edema. Bilateral adrenal nodules. RECOMMENDATIONS:   Unavailable         XR CHEST PORTABLE   Final Result   Rotational artifact with either artifactual prominence of the right hilum or   a right hilar mass. Recommend follow-up with two view chest radiograph or   chest CT to exclude a neoplastic process. MRI BRAIN WO CONTRAST    (Results Pending)           Assessment/Plan:    Active Hospital Problems    Diagnosis     Acute metabolic encephalopathy [I62.20]      Priority: Medium     Hypotension-due to hypovolemia/sepsis  -BP improved but borderline. Continue IVF. -Critical care was consulted. Patient was given midodrine. Currently NPO thus not getting oral medications,SLP to assess. Recommend echocardiogram.Held lasix. Acute metabolic encephalopathy-likely secondary to urinary tract infection/bacteremia  No improvement  -CT head non acute X 2  -FU MRI brain. Issues with obtaining info for questionaire. Plan to ask HCP. -Ammonia levels and ABG wnl  -Continue antibiotic for bacteremia  -Neurology consultation. EEG ordered. Homocysteine levels elevated,plan for MTHFR  -Palliative care consultation    ESBL E. coli bacteremia  -ID consulted.   Continue ivanz until 5/15  -Urine culture shows mixed katie  -No pneumonia on CT chest    FEN  -SLP consulted. Mentation is affecting evaluation with video swallow. Do not think corpak can be placed if patient cannot swallow. Consult General Surgery for PEG tube    Hypokalemia  -Monitor and replace    False hyperglycemia  -Labs are being drawn from PICC line with dextrose solution. POCT glucose levels are within range.     Acute on Chronic anemia-suspect bone marrow suppression.  -Hgb <7.0,Transfuse PRBC 5/4. Consent to be obtained from family. Decrease frequency of blood draws.  -Hold Plavix, Lovenox  -FU FOBT  -Folic acid low,started on folic acidosis    Thrombocytopenia-likely due to bacteremia  -Platelets continue trending down. Transfuse as needed. Hold Lovenox. Consulted Oncology. Suspect infection and inflammation. Cytometry pending.     Other medical issues.   CHF  HLD  History of chronic back pain and impaired mobility  Hx of polio  Hx of endometrial ca  Hx of uti in march 2022  Hx of intara abdomin infection  Dec 2021    DVT Prophylaxis: SCDs due to anemia  Diet: Diet NPO  Code Status: Limited    PT/OT Eval Status: as needed    Dispo - Return to SNF in [address] [place], [place] [address], [place]

## 2022-05-09 NOTE — PROCEDURES
[facility] Neurodiagnostic Report    MRN: [id number]   PATIENT NAME: Mandi Painting   DATE OF REPORT: 2022  DATE OF SERVICE: 2022    PHYSICIAN NAME: Dr. Kaitlynn Saleh  Referring Physician: Dr. Alex Calles       Patient's : 1955   Patient's Age: 79 y.o.   Gender: female     PROCEDURE: Routine EEG with video      Clinical Interpretation:   ***           ____________________________  Electronically signed by: Joann Figueroa DO, 2022 3:31 PM      Patient Clinical Information   Reason for Study: ***  Patient State: ***  Primary neurological diagnosis: ***   Primary indication for monitoring: ***    Pertinent Medications and Treatments    Current Facility-Administered Medications:     zinc sulfate (ZINCATE) capsule 50 mg, 50 mg, Oral, Daily, PHYLLIS Estrada - CNP    potassium chloride 20 mEq in dextrose 5 % and 0.2 % NaCl 1,000 mL infusion, , IntraVENous, Continuous, Milan Swenson MD, Last Rate: 75 mL/hr at 22 1250, New Bag at 22 1250    white petrolatum ointment, , Topical, BID, Ariela Betancourt MD, Given at 22 1118    white petrolatum ointment, , Topical, TID PRN, Ariela Betancourt MD, Given at 22 1949    0.9 % sodium chloride infusion, , IntraVENous, PRN, Glenda Nicolas DO    midodrine (PROAMATINE) tablet 5 mg, 5 mg, Oral, TID WC, Ariela Betancourt MD    acetaminophen (TYLENOL) tablet 650 mg, 650 mg, Oral, Q6H PRN **OR** acetaminophen (TYLENOL) suppository 650 mg, 650 mg, Rectal, Q6H PRN, Jhoan Morin MD    sodium chloride flush 0.9 % injection 5-40 mL, 5-40 mL, IntraVENous, 2 times per day, Dionna Friend MD, 10 mL at 22 0918    sodium chloride flush 0.9 % injection 5-40 mL, 5-40 mL, IntraVENous, PRN, Kvng Johnson MD    0.9 % sodium chloride infusion, , IntraVENous, PRN, Kvng Johnson MD    heparin flush 100 UNIT/ML injection 100 Units, 1 mL, IntraVENous, 2 times per day, Dionna Friend MD, 100 Units at 05/09/22 1111    heparin flush 100 UNIT/ML injection 100 Units, 1 mL, IntraCATHeter, PRN, Kvng Nixon MD    sodium chloride flush 0.9 % injection 5-40 mL, 5-40 mL, IntraVENous, 2 times per day, Noah Morfin MD, 10 mL at 05/07/22 1107    sodium chloride flush 0.9 % injection 5-40 mL, 5-40 mL, IntraVENous, PRN, Jayme Luong MD    0.9 % sodium chloride infusion, , IntraVENous, PRN, Jayme Luong MD    heparin flush 100 UNIT/ML injection 300 Units, 3 mL, IntraVENous, 2 times per day, Noah Morfin MD, 300 Units at 05/06/22 2137    heparin flush 100 UNIT/ML injection 300 Units, 3 mL, IntraCATHeter, PRN, Jayme Luong MD    perflutren lipid microspheres (DEFINITY) injection 1.65 mg, 1.5 mL, IntraVENous, ONCE PRN, Stacia Valenzuela, DO    folic acid injection 1 mg, 1 mg, IntraVENous, Daily, Julia Zepeda MD, 1 mg at 05/08/22 0915    pantoprazole (PROTONIX) injection 40 mg, 40 mg, IntraVENous, BID, Michell Tobar MD, 40 mg at 05/09/22 1109    ertapenem (INVanz) 1000 mg IVPB minibag, 1,000 mg, IntraVENous, Q24H, Jayme Luong MD, Stopped at 05/09/22 1251    [Held by provider] clopidogrel (PLAVIX) tablet 75 mg, 75 mg, Oral, Daily, Maximus Littlejohn MD, 75 mg at 05/01/22 0838    vitamin D (ERGOCALCIFEROL) capsule 50,000 Units, 50,000 Units, Oral, Once per day on Thu, Tasia Rose MD    [Held by provider] ferrous sulfate (IRON 325) tablet 325 mg, 325 mg, Oral, Every Other Day, Maximus Littlejohn MD, 325 mg at 04/30/22 2126    fluticasone (FLONASE) 50 MCG/ACT nasal spray 2 spray, 2 spray, Each Nostril, Daily, Tasia Rose MD, 2 spray at 05/08/22 0917    [Held by provider] furosemide (LASIX) tablet 40 mg, 40 mg, Oral, Daily, Maximus Littlejohn MD, 40 mg at 05/01/22 0837    [Held by provider] magnesium oxide (MAG-OX) tablet 400 mg, 400 mg, Oral, Daily, Tasia Rose MD, 400 mg at 05/01/22 0838    [Held by provider] oxybutynin (DITROPAN-XL) extended release tablet 10 mg, 10 mg, Oral, Daily, Maximus Littlejohn, MD, 10 mg at 05/01/22 0838    sertraline (ZOLOFT) tablet 50 mg, 50 mg, Oral, Daily, Brenda Murillo MD, 50 mg at 05/01/22 3725    atorvastatin (LIPITOR) tablet 10 mg, 10 mg, Oral, Daily, Brenda Murillo MD, 10 mg at 05/01/22 0837    [Held by provider] trospium (SANCTURA) tablet 20 mg, 20 mg, Oral, Nightly, Maximus Littlejohn MD, 20 mg at 04/30/22 2126    [Held by provider] enoxaparin (LOVENOX) injection 40 mg, 40 mg, SubCUTAneous, Daily, Brenda Murillo MD, 40 mg at 05/02/22 3781    polyethylene glycol (GLYCOLAX) packet 17 g, 17 g, Oral, Daily PRN, Brenda Murillo MD    ipratropium-albuterol (DUONEB) nebulizer solution 1 ampule, 1 ampule, Inhalation, Q4H PRN, Brenda Murillo MD      Sedatives administered: {YES/NO:19726}  Intubated: {YES/NO:19726}  Pharmacological paralytic: {YES/NO:19726}    Reporting Period  Start of Study: ***  End of Study:  ***      EEG Description  Digital video and scalp EEG monitoring was performed using the standard protocol for this laboratory. Scalp electrodes were applied in the international 10/20 system. Multiple digital montage arrangements were utilized for evaluation. EKG and video were recorded. Background:      Occipital rhythm (posterior dominant rhythm or PDR): {PRESENT/ABSENT:19725}   Frequency: *** Hz  Voltage: {RUSH HIGH/MEDIUM/LOW:6441667889}   Organization: {Desc; good/fair/poor:84142}  Reactivity to eye opening/closure: ***    Drowsiness: ***  Sleep: ***    Comments: ***    Technical and Activation Procedures:  Hyperventilation: ***        Photic stimulation: ***        Reactivity to stimulation: ***    Abnormalities:    I. Seizures? ***    II. Rhythmic or Periodic Patterns? ***    III. Other Abnormalities?         ***

## 2022-05-09 NOTE — PROGRESS NOTES
Comprehensive Nutrition Assessment    Type and Reason for Visit:  Consult,Reassess (General nutrition management; Cu/Zn deficiency)    Nutrition Recommendations/Plan:   1. Continue NPO, Will monitor for nutrition progression. If unable to progress; consider EN support if medically appropriate. Malnutrition Assessment:  Malnutrition Status:  No malnutrition (05/03/22 1058)    Context:  Acute Illness     Findings of the 6 clinical characteristics of malnutrition:  Energy Intake:  Mild decrease in energy intake (Comment)  Weight Loss:  Unable to assess (2/2 lack of wt hx on file to assess)     Body Fat Loss:  No significant body fat loss     Muscle Mass Loss:  No significant muscle mass loss    Fluid Accumulation:  No significant fluid accumulation     Strength:  Not Performed    Nutrition Assessment:    Pt. remains at nutritional risk d/t increased nutrient needs for wound healing and poor intake x 3 days 2/2 NPO status. Noted NPO 2/2 failed video swallow 5/6 and possible PEG placement if acute encephalopathy does not resolve. Pt. admit for AMS 2/2 metabolic encephalopathy 2/2 to septic shock d/t UTI/bacteremia. Noted 5/1 RRT pt was found unresponsive & hypotensive (improved) and Pt was transferred to MICU. PMHx noted for obesity, CHF. Noted wounds x2. Will monitor for nutrition progression/SLP recs/ any changes in feeding modailty. Nutrition Related Findings:    Disoriented x4, +I/O (1.0L), Non-pitting BL LE edema, hypoactive BS, hyperglycemia, hypernatremia, hypokalemia, low copper/zinc noted , likely 2/2 poor intake/NPO- per MD note supplement ordered and to replace PRN. Wound Type: Pressure Injury,Stage II       Current Nutrition Intake & Therapies:    Average Meal Intake: NPO  Average Supplements Intake: NPO  Diet NPO    Anthropometric Measures:  Height: 5' 2\" (157.5 cm)  Ideal Body Weight (IBW): 110 lbs (50 kg)    Admission Body Weight: 209 lb 6.4 oz (95 kg) (5/01 BS; Actual.)  Current Body Weight: (5/01 BS; Actual. CBW (5/09) likely elevated), 190.4 % IBW. Current BMI (kg/m2): 38.3  Usual Body Weight:  (MITCH d/t no significant wt hx on file to assess)     Weight Adjustment For: No Adjustment                 BMI Categories: Obese Class 2 (BMI 35.0 -39.9)    Estimated Daily Nutrient Needs:  Energy Requirements Based On: Formula     Energy (kcal/day): MSJ 1438x 1.2 SF =1700-1800kcal  Weight Used for Protein Requirements: Ideal  Protein (g/day): 65-75g (1.3-1.5g/kgIBW)  Method Used for Fluid Requirements: 1 ml/kcal  Fluid (ml/day): 1700-1800ml    Nutrition Diagnosis:   · Increased nutrient needs related to increase demand for energy/nutrients as evidenced by wounds      Nutrition Interventions:   Food and/or Nutrient Delivery: Continue NPO (Continue NPO, consider EN if unable to progress)  Nutrition Education/Counseling: No recommendation at this time  Coordination of Nutrition Care: Continue to monitor while inpatient       Goals:  Previous Goal Met: No Progress toward Goal(s)  Goals: by next RD assessment (nutrition progression)       Nutrition Monitoring and Evaluation:   Behavioral-Environmental Outcomes: None Identified  Food/Nutrient Intake Outcomes: Diet Advancement/Tolerance  Physical Signs/Symptoms Outcomes: Biochemical Data,Nutrition Focused Physical Findings,Weight,Skin    Discharge Planning:     Too soon to determine     Abbie Vences RD  Contact: ext [phone]

## 2022-05-09 NOTE — PROGRESS NOTES
Department of Internal Medicine  Infectious Diseases  Progress  Note      C/C : E coli and Providencia bacteremia, sepsis     Pt is awake and alert   Denies fever or chills  Denies pain   No distress  Afebrile       Current Facility-Administered Medications   Medication Dose Route Frequency Provider Last Rate Last Admin    zinc sulfate (ZINCATE) capsule 50 mg  50 mg Oral Daily PHYLLIS Estrada - CNP        potassium chloride 20 mEq in dextrose 5 % and 0.2 % NaCl 1,000 mL infusion   IntraVENous Continuous Brayan Garcia MD 75 mL/hr at 05/09/22 1250 New Bag at 05/09/22 1250    white petrolatum ointment   Topical BID Brayan Garcia MD   Given at 05/09/22 1118    white petrolatum ointment   Topical TID PRN Brayan Garcia MD   Given at 05/08/22 1949    0.9 % sodium chloride infusion   IntraVENous PRN Vianney Cristina DO        midodrine (PROAMATINE) tablet 5 mg  5 mg Oral TID WC Brayan Garcia MD        acetaminophen (TYLENOL) tablet 650 mg  650 mg Oral Q6H PRN Dar Bah MD        Or    acetaminophen (TYLENOL) suppository 650 mg  650 mg Rectal Q6H PRN Dar Bah MD        sodium chloride flush 0.9 % injection 5-40 mL  5-40 mL IntraVENous 2 times per day Mica Robbins MD   10 mL at 05/08/22 0918    sodium chloride flush 0.9 % injection 5-40 mL  5-40 mL IntraVENous PRN Mica Robbins MD        0.9 % sodium chloride infusion   IntraVENous PRN Mica Robbins MD        heparin flush 100 UNIT/ML injection 100 Units  1 mL IntraVENous 2 times per day Mica Robbins MD   100 Units at 05/09/22 1111    heparin flush 100 UNIT/ML injection 100 Units  1 mL IntraCATHeter PRN Kvng Lopes MD        sodium chloride flush 0.9 % injection 5-40 mL  5-40 mL IntraVENous 2 times per day Linda Luong MD   10 mL at 05/07/22 1107    sodium chloride flush 0.9 % injection 5-40 mL  5-40 mL IntraVENous PRN Jayme Luong MD        0.9 % sodium chloride infusion   IntraVENous PRN Fatemeh Hsu MD        heparin flush 100 UNIT/ML injection 300 Units  3 mL IntraVENous 2 times per day Fatemeh Hsu MD   300 Units at 05/06/22 2137    heparin flush 100 UNIT/ML injection 300 Units  3 mL IntraCATHeter PRN Jayme Luong MD        perflutren lipid microspheres (DEFINITY) injection 1.65 mg  1.5 mL IntraVENous ONCE PRN Daniel Garcia DO        folic acid injection 1 mg  1 mg IntraVENous Daily Mello Dent MD   1 mg at 05/08/22 0915    pantoprazole (PROTONIX) injection 40 mg  40 mg IntraVENous BID Stephan Carnes MD   40 mg at 05/09/22 1109    ertapenem (INVanz) 1000 mg IVPB minibag  1,000 mg IntraVENous Q24H Fatemeh Hsu MD   Stopped at 05/09/22 1251    [Held by provider] clopidogrel (PLAVIX) tablet 75 mg  75 mg Oral Daily Nura Barnes MD   75 mg at 05/01/22 0838    vitamin D (ERGOCALCIFEROL) capsule 50,000 Units  50,000 Units Oral Once per day on Thu Nura Barnes MD        [Held by provider] ferrous sulfate (IRON 325) tablet 325 mg  325 mg Oral Every Other Day Nura Barnes MD   325 mg at 04/30/22 2126    fluticasone (FLONASE) 50 MCG/ACT nasal spray 2 spray  2 spray Each Nostril Daily Nura Barnes MD   2 spray at 05/08/22 0917    [Held by provider] furosemide (LASIX) tablet 40 mg  40 mg Oral Daily Nuar Barnes MD   40 mg at 05/01/22 0837    [Held by provider] magnesium oxide (MAG-OX) tablet 400 mg  400 mg Oral Daily Nura Barnes MD   400 mg at 05/01/22 0838    [Held by provider] oxybutynin (DITROPAN-XL) extended release tablet 10 mg  10 mg Oral Daily Nura Barnes MD   10 mg at 05/01/22 0838    sertraline (ZOLOFT) tablet 50 mg  50 mg Oral Daily Nura Barnes MD   50 mg at 05/01/22 0838    atorvastatin (LIPITOR) tablet 10 mg  10 mg Oral Daily Nura Barnes MD   10 mg at 05/01/22 0837    [Held by provider] trospium (SANCTURA) tablet 20 mg  20 mg Oral Nightly Nura Barnes MD   20 mg at 04/30/22 2126    [Held by provider] enoxaparin (LOVENOX) injection 40 mg  40 mg SubCUTAneous Daily Duke Bowens MD   40 mg at 05/02/22 0639    polyethylene glycol (GLYCOLAX) packet 17 g  17 g Oral Daily PRN Duke Bowens MD        ipratropium-albuterol (DUONEB) nebulizer solution 1 ampule  1 ampule Inhalation Q4H PRN Duke Bowens MD            REVIEW OF SYSTEMS:       CONSTITUTIONAL: Denies fever or chills   HEENT: Denies sore throat    RESPIRATORY: denies cough, shortness of breath, sputum expectoration  CARDIOVASCULAR:  Denies palpitation or chest pain   GASTROINTESTINAL:  Denies abdomen pain, diarrhea or constipation. GENITOURINARY:  Denies burning urination or frequency of urination  INTEGUMENT: denies wound , rash  HEMATOLOGIC/LYMPHATIC:  Denies lymph node swelling, gum bleeding or easy bruising. MUSCULOSKELETAL:  Denies leg pain , joint pain , joint swelling  NEUROLOGICAL:  Awake and alert         PHYSICAL EXAM:      Vitals:     Vitals:    05/09/22 1100   BP: (!) 84/49   Pulse: 103   Resp: 20   Temp: 96.8 °F (36 °C)   SpO2: 100%       General Appearance:    Awake , confused    Head:    Normocephalic, atraumatic   Eyes:    No pallor, no icterus,   Ears:    No obvious deformity or drainage.    Nose:   No nasal drainage   Throat:   Mucosa moist, no oral thrush   Neck:   Supple, no lymphadenopathy   Back:     no CVA tenderness   Lungs:     Clear to auscultation bilaterally, no wheeze    Heart:    Regular rate and rhythm, no murmur   Abdomen:     Soft, non-tender, bowel sounds present    Extremities:   +  edema, no cyanosis    Pulses:   Dorsalis pedis palpable    Skin:   no rashes     Lines right arm PICC ( 5/2)     CBC with Differential:      Lab Results   Component Value Date    WBC 8.4 05/09/2022    RBC 2.71 05/09/2022    HGB 7.8 05/09/2022    HCT 24.1 05/09/2022    PLT 29 05/09/2022    MCV 88.9 05/09/2022    MCH 28.8 05/09/2022    MCHC 32.4 05/09/2022    RDW 17.4 05/09/2022    NRBC 0.9 05/05/2022    METASPCT 0.9 05/01/2022    LYMPHOPCT 48.1 05/09/2022    MONOPCT 4.5 05/09/2022 MYELOPCT 2.6 05/05/2022    BASOPCT 0.4 05/09/2022    MONOSABS 0.38 05/09/2022    LYMPHSABS 4.02 05/09/2022    EOSABS 0.35 05/09/2022    BASOSABS 0.03 05/09/2022       CMP     Lab Results   Component Value Date     05/09/2022    K 3.1 05/09/2022     05/09/2022    CO2 24 05/09/2022    BUN 7 05/09/2022    CREATININE 0.5 05/09/2022    GFRAA >60 05/09/2022    LABGLOM >60 05/09/2022    GLUCOSE 146 05/09/2022    PROT 4.2 05/09/2022    LABALBU 1.8 05/09/2022    CALCIUM 7.0 05/09/2022    BILITOT 0.3 05/09/2022    ALKPHOS 88 05/09/2022    AST 9 05/09/2022    ALT 5 05/09/2022         Hepatic Function Panel:    Lab Results   Component Value Date    ALKPHOS 88 05/09/2022    ALT 5 05/09/2022    AST 9 05/09/2022    PROT 4.2 05/09/2022    BILITOT 0.3 05/09/2022    LABALBU 1.8 05/09/2022       PT/INR:    Lab Results   Component Value Date    PROTIME 11.6 05/04/2022    INR 1.1 05/04/2022       TSH:    Lab Results   Component Value Date    TSH 2.860 05/02/2022       U/A:    Lab Results   Component Value Date    COLORU OTHER 04/30/2022    PHUR 8.5 04/30/2022    LABCAST FEW 02/07/2015    WBCUA PACKED 04/30/2022    RBCUA PACKED 04/30/2022    BACTERIA MANY 04/30/2022    CLARITYU TURBID 04/30/2022    SPECGRAV 1.010 04/30/2022    LEUKOCYTESUR LARGE 04/30/2022    UROBILINOGEN 1.0 04/30/2022    BILIRUBINUR LARGE 04/30/2022    BLOODU LARGE 04/30/2022    GLUCOSEU Negative 04/30/2022    AMORPHOUS PRESENT 03/27/2022       ABG:  No results found for: PQT2DHF, BEART, G5KEPJKU, PHART, THGBART, AHW9VBP, PO2ART, RHA0AWR    MICROBIOLOGY:    Blood culture -  Susceptibility      Escherichia coli (1)    Antibiotic Interpretation Microscan  Method Status    amoxicillin-clavulanate Intermediate ^16 mcg/mL BACTERIAL SUSCEPTIBILITY PANEL BY SADA     ceFAZolin Resistant >=^64 mcg/mL BACTERIAL SUSCEPTIBILITY PANEL BY SADA     cefepime Resistant ^16 mcg/mL BACTERIAL SUSCEPTIBILITY PANEL BY SADA     cefotaxime Resistant >=^64 mcg/mL BACTERIAL SUSCEPTIBILITY PANEL BY SADA     cefOXitin Intermediate ^16 mcg/mL BACTERIAL SUSCEPTIBILITY PANEL BY SADA     cefTAZidime-avibactam Sensitive ^0.5 mcg/mL BACTERIAL SUSCEPTIBILITY PANEL BY SADA     gentamicin Sensitive <=^1 mcg/mL BACTERIAL SUSCEPTIBILITY PANEL BY SADA     levofloxacin Sensitive ^1 mcg/mL BACTERIAL SUSCEPTIBILITY PANEL BY SADA     meropenem Sensitive <=^0.25 mcg/mL BACTERIAL SUSCEPTIBILITY PANEL BY SADA     piperacillin-tazobactam Sensitive <=^4 mcg/mL BACTERIAL SUSCEPTIBILITY PANEL BY SADA     trimethoprim-sulfamethoxazole Sensitive <=^20 mcg/mL BACTERIAL SUSCEPTIBILITY PANEL BY SADA       Providencia stuartii (2)    Antibiotic Interpretation Microscan  Method Status    ceFAZolin Resistant >=^64 mcg/mL BACTERIAL SUSCEPTIBILITY PANEL BY SADA     cefepime Sensitive <=^0.12 mcg/mL BACTERIAL SUSCEPTIBILITY PANEL BY SADA     cefotaxime Sensitive <=^0.25 mcg/mL BACTERIAL SUSCEPTIBILITY PANEL BY SADA     cefOXitin Sensitive <=^4 mcg/mL BACTERIAL SUSCEPTIBILITY PANEL BY SADA     cefTAZidime-avibactam Sensitive ^0. 25 mcg/mL BACTERIAL SUSCEPTIBILITY PANEL BY SADA     gentamicin Resistant <=^1 mcg/mL BACTERIAL SUSCEPTIBILITY PANEL BY SADA     levofloxacin Intermediate ^4 mcg/mL BACTERIAL SUSCEPTIBILITY PANEL BY SADA     meropenem Sensitive <=^0.25 mcg/mL BACTERIAL SUSCEPTIBILITY PANEL BY SADA     piperacillin-tazobactam Sensitive <=^4 mcg/mL BACTERIAL SUSCEPTIBILITY PANEL BY SADA     trimethoprim-sulfamethoxazole Sensitive <=^20 mcg/mL BACTERIAL SUSCEPTIBILITY PANEL BY SADA         Urine Culture -    10 to 100,000 CFU/mL   Mixed katie isolated. Further workup and sensitivity testing   is not routinely indicated and will not be performed. Mixed katie isolated includes:   Mixed gram negative rods   Proteus species   Nonhemolytic Strep species    Narrative:           Radiology :    Chest X ray : no infiltrates       IMPRESSION:     1  ESBL ( CTX M ) E coli , Providencia bacteremia, sepsis   2.  CAUTI ( Cobos catheter was changed )   3. Leukocytosis - improved   4. Micronutrient deficiency     RECOMMENDATIONS:      1. IV invanz 1 gram q 24 hrs till 5/15/22   2. Contact isolation   3.  Dietician consult

## 2022-05-09 NOTE — PROGRESS NOTES
Inpatient Hematology/Oncology Progress Note    Subjective:  Lethargic, AMS- not responding to questions     Objective:  BP (!) 84/49   Pulse 103   Temp 96.8 °F (36 °C) (Temporal)   Resp 20   Ht 5' 2\" (1.575 m)   Wt 226 lb 2 oz (102.6 kg)   SpO2 100%   BMI 41.36 kg/m²   GENERAL: Lethargic, not responding to questions    HEENT: PERRLA; EOMI. Oropharynx clear. NECK: Supple. Without lymphadenopathy. LUNGS: clear to ausculation, decreased air entry bilaterally. CARDIOVASCULAR: Tachycardic. No murmurs, rubs or gallops. ABDOMEN: Soft. Non-tender, non-distended. EXTREMITIES: bruising BUE, bilateral edema to hands right more significant then left. Barbie Joseph NEUROLOGIC: Lethargic, not responding to commands     Diagnostics  Lab Results   Component Value Date    WBC 8.4 05/09/2022    HGB 7.8 (L) 05/09/2022    HCT 24.1 (L) 05/09/2022    MCV 88.9 05/09/2022    PLT 29 (L) 05/09/2022     Lab Results   Component Value Date     (H) 05/09/2022    K 3.1 (L) 05/09/2022     (H) 05/09/2022    CO2 24 05/09/2022    BUN 7 05/09/2022    CREATININE 0.5 05/09/2022    GLUCOSE 146 (H) 05/09/2022    CALCIUM 7.0 (L) 05/09/2022    PROT 4.2 (L) 05/09/2022    LABALBU 1.8 (L) 05/09/2022    BILITOT 0.3 05/09/2022    ALKPHOS 88 05/09/2022    AST 9 05/09/2022    ALT 5 05/09/2022    LABGLOM >60 05/09/2022    GFRAA >60 05/09/2022     Lab Results   Component Value Date    IRON 82 05/02/2022    TIBC SEE BELOW (AA) 05/02/2022    FERRITIN 1,482 05/02/2022     Lab Results   Component Value Date    RETICCTPCT 0.2 (L) 05/02/2022     Impression/Plan:  80-year-old female patient with past medical history significant for hypertension, CKD, hyperlipidemia, CHF, TIA, anemia, and depression admitted 4/30/2022 with altered mental status from her nursing facility. Upon admission, WBC 19.5, hemoglobin 10.6, hematocrit 33.2, platelets 803, sodium 145, potassium 3.3, BUN 23, creatinine 0.8, calcium 8.4, lactic acid 1.9.   Urinalysis revealed positive nitrites, large amount of leukocyte esterase positive WBC, positive RBC indicative of UTI. Urine culture from 3/28/2022 positive E. Coli. Blood culture x1 on 4/30/2022 positive E. Coli. ID is following. On 5/1/2022 patient developed hypotension prompting a transfer to the ICU. NICOM was obtained and indicated patient was fluid responsive. She received a total of 1.5 L bolus and blood pressure improved. She was also noted to have a drop in hemoglobin and developed worsening thrombocytopenia. On 5/1/2022 her hemoglobin dropped from 11.3-8.4. Hemoglobin then dropped to 6.9 on 5/2/2022 requiring transfusion. Prior to her transfusion iron studies were obtained. Iron 82, ferritin 1482, iron saturation cannot be calculated due to unsaturated iron binding capacity result being below analytical measuring range. Vitamin B12 965, folate less than 2. She has been started on intravenous folic acid. , Peripheral blood smear revealing thrombocytopenia which may be related to decreased production or increased destruction. Normocytic anemia. Leukocytosis with neutrophilia and absolute lymphocytes, monocytes, eosinophils are decreased. Blood work done prior to admission on 3/27/2022 revealed a normal hemoglobin of 13.7, hematocrit 44, and mild thrombocytopenia with platelets 610. We have been asked to see her regarding anemia and thrombocytopenia in the setting of sepsis. New onset anemia and thrombocytopenia likely secondary to sepsis and inflammation. ID following. On Anise Marchi till 05/15/2022. Contact isolation  Peripheral blood smear revealing thrombocytopenia which may be related to decreased production or increased destruction. Normocytic anemia. Leukocytosis with neutrophilia and absolute lymphocytes, monocytes, eosinophils are decreased. Anemia/Thrombocytopenia workup ordered.     Haptoglobin 242  RF 11 (0-13)  GERARDO Negative  HIV Non-Reactive  Acute hepatitis panel Non-Reactive  Zinc: 30.1 copper: 68, supplement ordered   JAK2 pending  Flow cytometry with increased CD4+/CD8+ Tcells (3% of analyzed events) associated with viral infections, other infections and autoimmune disorders, likely to represent a reactive proliferation. No significant immunophenotypic abnormalities of remaining T cells, B cells or myeloid elements. MDS FISH pending  Folate deficient. IV Folic acid   Hb 7.8; PLT 29K 05/09/2022  Monitor CBC with diff. Tranfuse to keep Hgb >7, PLT >10. Will continue to follow    Tanya UMAÑA     The patient was seen and examined, I agree with OSMIN Arrieta's findings, assessment and plan as outlined.   05/09/2022  Minnie Diana MD

## 2022-05-09 NOTE — PLAN OF CARE
Problem: Discharge Planning  Goal: Discharge to home or other facility with appropriate resources  Outcome: Progressing     Problem: Skin/Tissue Integrity  Goal: Absence of new skin breakdown  Outcome: Progressing     Problem: Safety - Adult  Goal: Free from fall injury  Outcome: Progressing     Problem: Safety - Adult  Goal: Free from fall injury  Outcome: Progressing     Problem: ABCDS Injury Assessment  Goal: Absence of physical injury  Outcome: Progressing     Problem: Chronic Conditions and Co-morbidities  Goal: Patient's chronic conditions and co-morbidity symptoms are monitored and maintained or improved  Outcome: Progressing     Problem: Pain  Goal: Verbalizes/displays adequate comfort level or baseline comfort level  Outcome: Progressing     Problem: Neurosensory - Adult  Goal: Achieves stable or improved neurological status  Outcome: Progressing  Goal: Achieves maximal functionality and self care  Outcome: Progressing     Problem: Cardiovascular - Adult  Goal: Maintains optimal cardiac output and hemodynamic stability  Outcome: Progressing  Goal: Absence of cardiac dysrhythmias or at baseline  Outcome: Progressing     Problem: Skin/Tissue Integrity - Adult  Goal: Skin integrity remains intact  Outcome: Progressing  Goal: Incisions, wounds, or drain sites healing without S/S of infection  Outcome: Progressing  Goal: Oral mucous membranes remain intact  Outcome: Progressing     Problem: Genitourinary - Adult  Goal: Urinary catheter remains patent  Outcome: Progressing     Problem: Metabolic/Fluid and Electrolytes - Adult  Goal: Electrolytes maintained within normal limits  Outcome: Progressing  Goal: Hemodynamic stability and optimal renal function maintained  Outcome: Progressing  Goal: Glucose maintained within prescribed range  Outcome: Progressing     Problem: Hematologic - Adult  Goal: Maintains hematologic stability  Outcome: Progressing

## 2022-05-09 NOTE — PLAN OF CARE
MRI brain and EEG still pending. Will follow upon completion. Please call neurology with any concerns in the interim.

## 2022-05-10 NOTE — DEATH NOTES
Called to bedside to pronounce death after patient was noted to be in asystole at 06:40 5/10/2022. NO pupillary, corneal, doll's eye or gag reflex noted. NO heart sounds or pulse noted. NO Chest rise or Lung sounds noted. Time of death declared at 06:40.   Zeb Farrell MD   5/10/2022  7:45 AM

## 2022-05-10 NOTE — PROGRESS NOTES
GENERAL SURGERY  DAILY PROGRESS NOTE  5/10/2022    Chief Complaint   Patient presents with    Altered Mental Status     Per facility, patient has been unresponsive except to pain and not her baseline. Alert and talking upon arrival, bloody urine in lizama. Subjective:  Remains very altered again today. Not moving limbs and not woken up by voice. Barely arousable to sternal rubbing     Objective:  BP (!) 102/54   Pulse 92   Temp 98 °F (36.7 °C) (Temporal)   Resp 22   Ht 5' 2\" (1.575 m)   Wt 226 lb 2 oz (102.6 kg)   SpO2 96%   BMI 41.36 kg/m²     GENERAL:  Laying in bed, no moving   LUNGS:  Mild increased work of breathing,  on NC  CARDIOVASCULAR:  RR  ABDOMEN:  Soft, non-tender, non-distended  EXTREMITIES: No edema or swelling  SKIN: Warm and dry    Assessment/Plan:  79 y.o. female with acute metabolic encephalopathy secondary to septic shock due to urinary tract infection/bacteremia    corpak today   Will follow up EEG and MRI results   If there is evidence of structural damage, a PEG tube may be helpful for placement purposes   Continue NPO  Will need to discuss PEG tube with POA        Electronically signed by Viir Gonzalez DO on 5/10/2022 at 5:30 AM      Addendum:     Patient passed away this morning after an episode of bradycardia.  Time of death pronounced at 200    Electronically signed by Viri Gonzalez DO on 5/10/2022 at 7:34 AM

## 2022-05-10 NOTE — PROGRESS NOTES
rrt called for pt bradyacardia, pt limited code, attempted to call family over 5 times with multiple numbers, no answer, all numbers said disconnected, pt from [address] [place] [address] home, called them for any contact information, all samne numbers were provided and called again, this was communicated with RRT drs and primary doctor Dr. Felicity Rojo, time of death pronounced by nuria at , life [facility] called reference number [phone], clinical nurse manager notified

## 2022-05-10 NOTE — SIGNIFICANT EVENT
Code blue note    Code blue was called at 06:10     On arrival, pt was in PEA arrest     ACLS protocol was initiated and the following was given:  Adrenaline:  According to the ACLS protocol (Total of 30 minutes every 3 minutes)    Sodium bicarbonate: 50 meq *4    V fib arrest 200/200/300/360 unsynchronized cardioversion, Amiodarone 300/150 and Lidocaine 100 mg   IV fluid continued   ABG attempted with no success     Patient is limited CODE (No intubation, No CPR)       Family was called multiple times by the nursing staff  PCP was updated     Patient was pronounced dead at 06:40     Emily Morgan MD, MD  Internal Medicine resident  5/10/2022  7:41 AM

## 2022-05-10 NOTE — CARE COORDINATION
Noted that patient passed this am at 6:40 am after bradycardia. Call placed to nilo, liaison with [facility] at the [facility] to notify the patient's home that she has passed away. Detailed VM left with number for return call. Mauricio Soulier, RN.

## 2022-05-10 NOTE — DISCHARGE SUMMARY
Hospital Medicine Discharge Summary    Patient ID: Mandi Painting      Patient's PCP: Irish Fitch MD    Admitting Physician: Delaney Roca MD  Discharge Physician: Ariela Betancourt MD     Admit Date: 2022     Disposition:     Discharge Diagnoses: Active Hospital Problems    Diagnosis     Palliative care by specialist [Z51.5]      Priority: Medium    Acute metabolic encephalopathy [H76.13]      Priority: Medium       Date of Death:5/10/22  Time of Death:6:40    Immediate Cause of Death:Cardiac arrest    Underlying Conditions:  Acute metabolic encephalopathy  ESBL E.coli bacteremia  Acute on chronic anemia  Thrombocytopenia    Other Contributing Conditions:Hx Polio,Hx endometrial Ca    Hospital Course:  57-year-old nursing home resident with chronic lizama ,presents with altered mental status. EMS reports nursing home states she was unresponsive except to pain and was disoriented. Patient was found to have urinary infection in the ED and she was started on Rocephin. complicated by hypotension requiring ICU stay. Pt was transferred to the floor. She was treated for ESBL E.coli with ivanz. She continued to have encephalopathy. She had repeat CT head x 2 that were no acute. Ammonia and ABG were fine. Neurology consulted. EEG ordered. She began to have thrombocytopenia. Hematology was consulted. Etiology thought to be bone marrow suppression. Hgb also started to decrease. General surgery had been consulted for PEG due to persistent encephalopathy. Pt then had cardiac arrest.She was not able to be resuscitated.  She passed away.      Consults:  IP CONSULT TO INTERNAL MEDICINE  IP CONSULT TO INFECTIOUS DISEASES  IP CONSULT TO PHARMACY  IP CONSULT TO CRITICAL CARE  IP CONSULT TO DIETITIAN  IP CONSULT TO ONCOLOGY  IP CONSULT TO NEUROLOGY  IP CONSULT TO GENERAL SURGERY  IP CONSULT TO PALLIATIVE CARE  IP CONSULT TO DIETITIAN  IP CONSULT TO IV TEAM    Signed:  Ariela Betancourt MD MD   5/10/2022    Thank you Pradeep Rony Koch MD for the opportunity to be involved in this patient's care. If you have any questions or concerns please feel free to contact me at [phone].

## 2022-05-10 NOTE — PROGRESS NOTES
5/9-Spoke to RN about screening still needing to be done- RN stated patient cannot answer and they have not been able to get ahold of family. Will talk to  Neuro today about discontinuing or a waiver would need signed.

## 2022-05-11 LAB
Lab: NORMAL
REPORT: NORMAL
THIS TEST SENT TO: NORMAL

## 2022-05-12 NOTE — ADT AUTH CERT
Patient Demographics    Name Patient ID [facility] Gender Identity Birth Date   Claire Ann [id number]  Female [birth year] [facility])     Address Phone Email Employer    [employer]  [place] [phone] [phone] (C)   [phone] (H) [email or web address] [address] Race Occupation Emp Status    [employer] White (non-) -- Disabled      Reg Status PCP Date Last Verified Next Review Date    Verified Matilda Ervin MD  [phone] 04/30/22 05/30/22      Admission Date Discharge Date Admitting Provider     04/30/22 05/10/22 Jonette Dance, MD       Marital Status Latter day      Single [employer]ism        Emergency Contact 1   Nafisa Mccarty ([place])   [phone] (H)       Utilization Reviews         5/3-5/9    CD4-CD10 by Camille England RN       Review Entered Review Status   5/12/2022 11:03 In Primary      Criteria Review   DATE:   5/3   CD4   ICU     Pertinent Updates:  Pt is mildly lethargic. She is answering questions. She is fatigued.     Vitals:   97.5      83      102/48      93% on RA        Physical Exam:  General appearance: No apparent distress, appears stated age. Lethargic. HEENT: Pupils equal, round, and reactive to light. Conjunctivae/corneas clear. Neck: Supple, with full range of motion. No jugular venous distention. Trachea midline. Respiratory:  Normal respiratory effort. Clear to auscultation, bilaterally without Rales/Wheezes/Rhonchi. Cardiovascular: Regular rate and rhythm with normal S1/S2 without murmurs, rubs or gallops. Abdomen: Soft, non-tender, non-distended with normal bowel sounds. Musculoskeletal: No clubbing, cyanosis or edema bilaterally.  Full range of motion without deformity. Skin: Skin color, texture, turgor normal.  No rashes or lesions.   Neurologic:  Moving ext  Psychiatric: Alert and oriented x 2     Abnl/Pertinent Labs/Radiology/Diagnostic Studies:  Chloride 111  Glucose 155  Calcium 7.2  Phosphorus 1.8  Total protein 4.6  Albumin 1.6  Rbc 2.63  Hgb 7.1  Hct 22.6  Mchc 30.8  Rdw 16.7  Platelet 47           Imaging:  None today     Medications:  NS 250ml bolus x1  Invanz 1000mg ivpb q24hrs  Flonase 50mcg 2 spray each nostril daily  Folic acid 1mg iv daily  Heparin flush 100units iv bid each PICC port  Protonix 40mg iv bid  D5 1/2NS with KCI 20meq @75ml/hr continuous infusion  PO meds on hold - NPO     Orders:  Cardiac output monitoring     MD Consults/Assessments & Plans:  IM PN:  Assessment/Plan:               Active Hospital Problems     Diagnosis      Acute metabolic encephalopathy [B50.42]         Priority: Medium      Hypotension-due to hypovolemia/sepsis  -BP mildly low  -Critical care was consulted.  Patient was given midodrine.  Currently NPO thus not getting oral medications,SLP to assess. Recommend echocardiogram.Held lasix.     Acute metabolic encephalopathy-likely secondary to urinary tract infection/bacteremia  -CT head non acute  -Continue antibiotic for bacteremia  -MRI if no improvement     ESBL E. coli bacteremia  -ID consulted.  Continue ivanz.  -Wound culture shows mixed katie  -No pneumonia on CT chest     Hypokalemia  -Monitor and replace     Chronic anemia  -Hgb stable  -Hold Plavix, Lovenox     Thrombocytopenia-likely due to bacteremia  -Platelets trending down.  Transfuse as needed.  Hold Lovenox.     Other medical issues. CHF  HLD  History of chronic back pain and impaired mobility  Hx of polio  Hx of endometrial ca  Hx of uti in march 2022  Hx of intara abdomin infection  Dec 2021     DVT Prophylaxis: SCDs  Diet: Diet NPO  Code Status: Limited        Infectious Disease PN:     IMPRESSION:      1 ESBL ( CTX M ) E coli bacteremia, sepsis   2. CAUTI ( Cobos catheter was changed )   3. Leukocytosis - improved         RECOMMENDATIONS:       1. IV invanz 1 gram q 24 hrs ~ 10 days   2. Contact isolation               DATE:   5/4   CD5  ICU           Pertinent Updates:  Pt is more lethargic. She is still NPO.     Vitals: 98.1    16     83     98/53        95 on RA        Physical Exam:  General appearance: No apparent distress, appears stated age. Lethargic. HEENT: Pupils equal, round, and reactive to light. Conjunctivae/corneas clear. Neurologic:  Moving ext  Psychiatric: Alert and oriented x 2        Abnl/Pertinent Labs/Radiology/Diagnostic Studies:  Chloride 109  Anion gap 6  Lactic acid 2.5, 2.8  Glucose 404  Calcium serum 7.0  Total protein 4.3  Zinc 30.1  Copper 68.1  Albumin 1.7  Rbc 2.43  Hgb 6.7  Hct 20.7  Rdw 16.6  Platelet 37  Haptoglobin 242  Ptt 23.0     Blood gas  Ph 7.451  Pco2 34.4  Thb thb 7.7     Imaging:  CT Head  No acute intracranial abnormality.       Cortical atrophy and periventricular leukomalacia.         Medications:  Invanz 1000mg ivpb q24hrs  Flonase 50mcg 2 spray each nostril daily  Folic acid 1mg iv daily  Heparin flush 100units iv bid each PICC port  Protonix 40mg iv bid  D5 1/2NS with KCI 20meq @75ml/hr continuous infusion  PO Meds held - NPO        Orders:  transfuse RBC x1 unit  sequential compression  consult oncology        MD Consults/Assessments & Plans:  IM PN:     Assessment/Plan:               Active Hospital Problems     Diagnosis      Acute metabolic encephalopathy [D96.62]         Priority: Medium      Hypotension-due to hypovolemia/sepsis  -BP improved but borderline. Continue IVF. -Critical care was consulted. Hany Coronado was given midodrine.  Currently NPO thus not getting oral medications,SLP to assess. Recommend echocardiogram.Held lasix.     Acute metabolic encephalopathy-likely secondary to urinary tract infection/bacteremia  -CT head non acute X 2  -Check ammonia levels and ABG  -Continue antibiotic for bacteremia  -MRI if no improvement     ESBL E. coli bacteremia  -ID consulted.  Continue ivanz x 10days  -Urine culture shows mixed katie  -No pneumonia on CT chest     Hypokalemia  -Monitor and replace     Acute on Chronic anemia-suspect bone marrow suppression.  -Hgb <7.0,Transfuse PRBC. Consent to be obtained from family. Decrease frequency of blood draws.  -Hold Plavix, Lovenox  -Check FOBT     Thrombocytopenia-likely due to bacteremia  -Platelets trending down.  Transfuse as needed.  Hold Lovenox. Consult Oncology.     Other medical issues.   CHF  HLD  History of chronic back pain and impaired mobility  Hx of polio  Hx of endometrial ca  Hx of uti in march 2022  Hx of intara abdomin infection  Dec 2021     DVT Prophylaxis: SCDs due to anemia  Diet: Diet NPO  Code Status: Limited     PT/OT Eval Status: as needed     Dispo - SNF in 2-3days        Hem/Onc PN:     Impression/Plan:  Ms. Maria Del Rosario Dillon is a 51-year-old female patient with past medical history significant for hypertension, CKD, hyperlipidemia, CHF, TIA, anemia, and depression admitted 4/30/2022 with altered mental status from her nursing facility. [address] admission, WBC 19.5, hemoglobin 10.6, hematocrit 33.2, platelets 749, sodium 145, potassium 3.3, BUN 23, creatinine 0.8, calcium 8.4, lactic acid 1.9.  Urinalysis revealed positive nitrites, large amount of leukocyte esterase positive WBC, positive RBC indicative of UTI.  Urine culture from 3/28/2022 positive E. Coli.  Blood culture x1 on 4/30/2022 positive E. Coli.  ID is following.     On 5/1/2022 patient developed hypotension prompting a transfer to the ICU.  NICOM was obtained and indicated patient was fluid responsive.  She received a total of 1.5 L bolus and blood pressure improved.  She was also noted to have a drop in hemoglobin and developed worsening thrombocytopenia.  On 5/1/2022 her hemoglobin dropped from 11.3-8.4.  Hemoglobin then dropped to 6.9 on 5/2/2022 requiring transfusion. David Be to her transfusion iron studies were obtained. Lottie Precise 82, ferritin 1482, iron saturation cannot be calculated due to unsaturated iron binding capacity result being below analytical measuring range.  Vitamin B12 965, folate less than 2.  She has been started on intravenous folic acid. , Peripheral blood smear revealing thrombocytopenia which may be related to decreased production or increased destruction.  Normocytic anemia.  Leukocytosis with neutrophilia and absolute lymphocytes, monocytes, eosinophils are decreased.     Blood work done prior to admission on 3/27/2022 revealed a normal hemoglobin of 13.7, hematocrit 44, and mild thrombocytopenia with platelets 782.  We have been asked to see her regarding anemia and thrombocytopenia in the setting of sepsis.     -New onset anemia and thrombocytopenia likely secondary to sepsis and inflammation.   -ID following. On Invanz.   -Peripheral blood smear revealing thrombocytopenia which may be related to decreased production or increased destruction. Normocytic anemia.  Leukocytosis with neutrophilia and absolute lymphocytes, monocytes, eosinophils are decreased.  -Anemia/Thrombocytopenia workup ordered.  -Folate deficient. IV Folic acid ordered  -Monitor CBCD closely. Tranfuse to keep Hgb >7, PLT >10. Receiving pRBC today.  -Will continue to follow              DATE:   5/5   [facility]    ICU           Pertinent Updates:  She is alert today. She responds to voice. She has low phonation.     Vitals:   97.8    17     86     105/62     98% on RA        Physical Exam:     General appearance: No apparent distress, appears stated age. Lethargic. HEENT: Pupils equal, round, and reactive to light. Conjunctivae/corneas clear.   Neurologic:  Moving ext  Psychiatric: Alert and oriented x 2     Abnl/Pertinent Labs/Radiology/Diagnostic Studies:  Chloride 111  Co2 20  Glucose 386  Calcium serum 6.8  Total protein 4.2  Albumin 1.6  Rbc 2.90  Hgb 8.1  Hct 24.6  Rdw 16.6  Platelet 29        Imaging:  None today     Medications:  Invanz 1000mg ivpb q24hrs  Flonase 50mcg 2 spray each nostril daily  Folic acid 1mg iv daily  Heparin flush 100units iv bid each PICC port  Protonix 40mg iv bid  D5 1/2NS with KCI 20meq @75ml/hr continuous infusion  PO Meds held - NPO        Orders:  dressing between abdominal folds     MD Consults/Assessments & Plans:  IM PN:     Assessment/Plan:               Active Hospital Problems     Diagnosis      Acute metabolic encephalopathy [F04.70]         Priority: Medium      Hypotension-due to hypovolemia/sepsis  -BP improved but borderline. Continue IVF. -Critical care was consulted. Radha Benoit was given midodrine.  Currently NPO thus not getting oral medications,SLP to assess. Recommend echocardiogram.Held lasix.     Acute metabolic encephalopathy-likely secondary to urinary tract infection/bacteremia  Pt is improved today  -CT head non acute X 2  -Ammonia levels and ABG wnl  -Continue antibiotic for bacteremia     ESBL E. coli bacteremia  -ID consulted.  Continue ivanz x 10days  -Urine culture shows mixed katie  -No pneumonia on CT chest     Hypokalemia  -Monitor and replace     False hyperglycemia  -Labs are being drawn from PICC line with dextrose solution. POCT glucose levels are within range.     Acute on Chronic anemia-suspect bone marrow suppression.  -Hgb <7.0,Transfuse PRBC. Consent to be obtained from family. Decrease frequency of blood draws.  -Hold Plavix, Lovenox  -FU FOBT     Thrombocytopenia-likely due to bacteremia  -Platelets continue trending down.  Transfuse as needed.  Hold Lovenox. Consulted Oncology. Suspect infection and inflammatiion.     Other medical issues. CHF  HLD  History of chronic back pain and impaired mobility  Hx of polio  Hx of endometrial ca  Hx of uti in march 2022  Hx of intara abdomin infection  Dec 2021     DVT Prophylaxis: SCDs due to anemia  Diet: Diet NPO  Code Status: Limited     PT/OT Eval Status: as needed     Dispo - SNF in 2-3days           Infectious Disease PN:     IMPRESSION:      1 ESBL ( CTX M ) E coli , Providencia bacteremia, sepsis   2. CAUTI ( Cobos catheter was changed )   3. Leukocytosis - improved         RECOMMENDATIONS:       1. IV invanz 1 gram q 24 hrs till 5/15/22   2. Contact isolation                  DATE:   5/6   CD7           Pertinent Updates:  Echo today  Modified Barium Swallow Video     Vitals:   97.6    18    81    115/71      98% on RA        Physical Exam:  GENERAL: Alert. Answers yes and no questions. Confused. HEENT: PERRLA; EOMI. Oropharynx clear. LUNGS: Decreased air entry bilaterally. EXTREMITIES: Extensive bruising to BUE. NEUROLOGIC: No focal deficits.      Abnl/Pertinent Labs/Radiology/Diagnostic Studies:  Chloride 111  Glucose 138  Calcium serum 7.0  Total protein 4.5  Albumin 1.8  Rbc 2.97  Hgb 8.3  Hct 26.4  Rdw 16.8  Platelet 29        Imaging:  ECHO:   Summary   Micro-bubble contrast injected to enhance left ventricular visualization.      Normal left ventricular size and systolic function.   Ejection fraction is visually estimated at 60-65%.    Grade II diastolic dysfunction.   No regional wall motion abnormalities seen.   Mild left ventricular concentric hypertrophy noted.   Dilated right ventricle with mildly reduced function.   RV free wall appears hypertrophied.   Appears to be right atrial diastolic collapse of unclear significance.   No evidence of pericardial effusion.   Trace aortic valve regurgitation not well seen.           FL Modified Barium Swallow Video  Severely limited evaluation due to patient unable to pass contrast media   through the hypopharynx.       Please see separate speech pathology report for full discussion of findings   and recommendations.         Medications:     Invanz 1000mg ivpb q24hrs  Flonase 50mcg 2 spray each nostril daily  Folic acid 1mg iv daily  Heparin flush 100units iv bid each PICC port  Protonix 40mg iv bid  D5 1/2NS with KCI 20meq @75ml/hr continuous infusion  PO Meds held - NPO           Orders:  No new orders     MD Consults/Assessments & Plans:  IM PN:  Assessment/Plan:               Active Hospital Problems     Diagnosis      Acute metabolic encephalopathy [J53.69]         Priority: Medium    Hypotension-due to hypovolemia/sepsis  -BP improved but borderline. Continue IVF. -Critical care was consulted. Nicky Fernandez was given midodrine.  Currently NPO thus not getting oral medications,SLP to assess. Recommend echocardiogram.Held lasix.     Acute metabolic encephalopathy-likely secondary to urinary tract infection/bacteremia  Pt is improved  -CT head non acute X 2  -Ammonia levels and ABG wnl  -Continue antibiotic for bacteremia     ESBL E. coli bacteremia  -ID consulted.  Continue ivanz x 10days  -Urine culture shows mixed katie  -No pneumonia on CT chest     FEN  -SLP consulted. Await video swallow.     Hypokalemia  -Monitor and replace     False hyperglycemia  -Labs are being drawn from PICC line with dextrose solution. POCT glucose levels are within range.     Acute on Chronic anemia-suspect bone marrow suppression.  -Hgb <7.0,Transfuse PRBC. Consent to be obtained from family. Decrease frequency of blood draws.  -Hold Plavix, Lovenox  -FU FOBT  -Folic acid low,started on folic acidosis     Thrombocytopenia-likely due to bacteremia  -Platelets continue trending down.  Transfuse as needed.  Hold Lovenox. Consulted Oncology. Suspect infection and inflammatiion.     Other medical issues. CHF  HLD  History of chronic back pain and impaired mobility  Hx of polio  Hx of endometrial ca  Hx of uti in march 2022  Hx of intara abdomin infection  Dec 2021     DVT Prophylaxis: SCDs due to anemia  Diet: Diet NPO  Code Status: Limited     SLP PN:  RESULTS:                       DYSPHAGIA DIAGNOSIS:  limited intake on exam, not able to determine type or severity of dysphagia     Pt cognitive ability negatively impacting completion of exam. Unable to complete A-P bolus movement, thus resulting in anterior loss of bolus from mouth and not able to move to pharynx for function swallow.  Recommend NPO with alternate means of nutrition at this time until Pt's cognitive ability/ alertness improves for functional participation.       DIET RECOMMENDATIONS:  NPO including medications to be given via alternate method      .             DATE:   5/7   [facility]   ICU           Pertinent Updates:  She is lethargic at times.     Vitals:    98.0    20, 20,    107/68       98% on RA        Physical Exam:  General appearance: No apparent distress, appears stated age. Lethargic. HEENT: Pupils equal, round, and reactive to light. Conjunctivae/corneas clear. Neurologic:  Moving ext  Psychiatric: Alert and oriented x 1     Abnl/Pertinent Labs/Radiology/Diagnostic Studies:  Chloride 113  Co2 20  Glucose 410  Calcium serum 6.8  Total protein 4.1  Albumin 1.6  Rbc 2.78  Hgb 7.9  Hct 24.3  Rdw 17.1  Platelet 26           Imaging:  None today     Medications:        Invanz 1000mg ivpb q24hrs  Flonase 50mcg 2 spray each nostril daily  Folic acid 1mg iv daily  Heparin flush 100units iv bid each PICC port  Protonix 40mg iv bid  D5 1/2NS  @75ml/hr continuous infusion  PO Meds held - NPO        Orders:  consult neurology     MD Consults/Assessments & Plans:     Assessment/Plan:               Active Hospital Problems     Diagnosis      Acute metabolic encephalopathy [S24.29]         Priority: Medium      Hypotension-due to hypovolemia/sepsis  -BP improved but borderline. Continue IVF. -Critical care was consulted. Josr Villafuerte was given midodrine.  Currently NPO thus not getting oral medications,SLP to assess. Recommend echocardiogram.Held lasix.     Acute metabolic encephalopathy-likely secondary to urinary tract infection/bacteremia  No improvement  -CT head non acute X 2  -Check MRI brain  -Ammonia levels and ABG wnl  -Continue antibiotic for bacteremia  -Neurology consultation     ESBL E. coli bacteremia  -ID consulted.  Continue ivanz x 10days  -Urine culture shows mixed katie  -No pneumonia on CT chest     FEN  -SLP consulted. Mentation is affecting evaluation. Will consider PEG tube     Hypokalemia  -Monitor and replace     False hyperglycemia  -Labs are being drawn from PICC line with dextrose solution. POCT glucose levels are within range.     Acute on Chronic anemia-suspect bone marrow suppression.  -Hgb <7.0,Transfuse PRBC. Consent to be obtained from family. Decrease frequency of blood draws.  -Hold Plavix, Lovenox  -FU FOBT  -Folic acid low,started on folic acidosis     Thrombocytopenia-likely due to bacteremia  -Platelets continue trending down.  Transfuse as needed.  Hold Lovenox. Consulted Oncology. Suspect infection and inflammation. Cytometry pending.     Other medical issues. CHF  HLD  History of chronic back pain and impaired mobility  Hx of polio  Hx of endometrial ca  Hx of uti in march 2022  Hx of intara abdomin infection  Dec 2021     DVT Prophylaxis: SCDs due to anemia  Diet: Diet NPO  Code Status: Limited        Infectious Disease PN:     IMPRESSION:      1  ESBL ( CTX M ) E coli , Providencia bacteremia, sepsis   2. CAUTI ( Cobos catheter was changed )   3. Leukocytosis - improved         RECOMMENDATIONS:       1. IV invanz 1 gram q 24 hrs till 5/15/22   2.  Contact isolation         Hem/Onc PN:  Impression/Plan:  26-year-old female patient with past medical history significant for hypertension, CKD, hyperlipidemia, CHF, TIA, anemia, and depression admitted 4/30/2022 with altered mental status from her nursing facility. [address] admission, WBC 19.5, hemoglobin 10.6, hematocrit 33.2, platelets 791, sodium 145, potassium 3.3, BUN 23, creatinine 0.8, calcium 8.4, lactic acid 1.9.  Urinalysis revealed positive nitrites, large amount of leukocyte esterase positive WBC, positive RBC indicative of UTI.  Urine culture from 3/28/2022 positive E. Coli.  Blood culture x1 on 4/30/2022 positive E. Coli.  ID is following.     On 5/1/2022 patient developed hypotension prompting a transfer to the ICU.  NICOM was obtained and indicated patient was fluid responsive.  She received a total of 1.5 L bolus and blood pressure improved.  She was also noted to have a drop in hemoglobin and developed worsening thrombocytopenia.  On 5/1/2022 her hemoglobin dropped from 11.3-8.4.  Hemoglobin then dropped to 6.9 on 5/2/2022 requiring transfusion. Zain De Leon to her transfusion iron studies were obtained. Suella Patricia 82, ferritin 1482, iron saturation cannot be calculated due to unsaturated iron binding capacity result being below analytical measuring range.  Vitamin B12 965, folate less than 2.  She has been started on intravenous folic acid. , Peripheral blood smear revealing thrombocytopenia which may be related to decreased production or increased destruction.  Normocytic anemia.  Leukocytosis with neutrophilia and absolute lymphocytes, monocytes, eosinophils are decreased.     Blood work done prior to admission on 3/27/2022 revealed a normal hemoglobin of 13.7, hematocrit 44, and mild thrombocytopenia with platelets 184.  We have been asked to see her regarding anemia and thrombocytopenia in the setting of sepsis.     New onset anemia and thrombocytopenia likely secondary to sepsis and inflammation.   ID following. On Bettina Smiling till 05/15/2022. Contact isolation  Peripheral blood smear revealing thrombocytopenia which may be related to decreased production or increased destruction. Normocytic anemia.  Leukocytosis with neutrophilia and absolute lymphocytes, monocytes, eosinophils are decreased. Anemia/Thrombocytopenia workup ordered. Haptoglobin 242  RF 11 (0-13)  GERARDO Negative  HIV Non-Reactive  Acute hepatitis panel Non-Reactive  Zinc copper pending  JAK2 pending  Flow cytometry pending  MDS FISH pending  Folate deficient. IV Folic acid ordered  Monitor CBC with diff. Tranfuse to keep Hgb >7, PLT >10. Will continue to follow           DATE:   5/8  9   ICU           Pertinent Updates:  She wakes up to voice. She is not verbal.     Vitals:   97.3     16    93     95/57     95% on RA        Physical Exam:     General appearance: No apparent distress, appears stated age.Lethargic. Neurologic:  Moving ext  Psychiatric: Alert and oriented x 0        Abnl/Pertinent Labs/Radiology/Diagnostic Studies:  Chloride 114  Glucose 141  Calcium 7.2  Total protein 4.2  Albumin 1.7  Rbc 2.90  Hgb 8.3, 7.9, 7.8  Hct 26.1, 24.4, 23.9  Rdw 17.3  Platelet 29        Imaging:  None today     Medications:  Invanz 1000mg ivpb q24hrs  Flonase 50mcg 2 spray each nostril daily  Folic acid 1mg iv daily  Heparin flush 100units iv bid each PICC port  Protonix 40mg iv bid  D5 1/2NS  @75ml/hr continuous infusion  PO Meds held - NPO        Orders:  No new orders     MD Consults/Assessments & Plans:  IM PN:        Assessment/Plan:               Active Hospital Problems     Diagnosis      Acute metabolic encephalopathy [G52.76]         Priority: Medium      Hypotension-due to hypovolemia/sepsis  -BP improved but borderline. Continue IVF. -Critical care was consulted. Beto Rutherford was given midodrine.  Currently NPO thus not getting oral medications,SLP to assess. Recommend echocardiogram.Held lasix.     Acute metabolic encephalopathy-likely secondary to urinary tract infection/bacteremia  No improvement  -CT head non acute X 2  -FU MRI brain  -Ammonia levels and ABG wnl  -Continue antibiotic for bacteremia  -Neurology consultation. EEG ordered.     ESBL E. coli bacteremia  -ID consulted.  Continue ivanz until 5/15  -Urine culture shows mixed katie  -No pneumonia on CT chest     FEN  -SLP consulted. Mentation is affecting evaluation with video swallow. Do not think corpak can be placed if patient cannot swallow. Will consider PEG tube     Hypokalemia  -Monitor and replace     False hyperglycemia  -Labs are being drawn from PICC line with dextrose solution. POCT glucose levels are within range.     Acute on Chronic anemia-suspect bone marrow suppression.  -Hgb <7.0,Transfuse PRBC 5/4. Consent to be obtained from family. Decrease frequency of blood draws.  -Hold Plavix, Lovenox  -FU FOBT  -Folic acid low,started on folic acidosis     Thrombocytopenia-likely due to bacteremia  -Platelets continue trending down.  Transfuse as needed.  Hold Lovenox. Consulted Oncology. Suspect infection and inflammation. Cytometry pending.     Other medical issues. CHF  HLD  History of chronic back pain and impaired mobility  Hx of polio  Hx of endometrial ca  Hx of uti in march 2022  Hx of intara abdomin infection  Dec 2021     DVT Prophylaxis: SCDs due to anemia  Diet: Diet NPO  Code Status: Limited        Neurology PN:     Assessment  Ann Hong is a 79 y.o. female with fluctuating mental status over the past week and has had bacteremia, hypotension, anemia, and low folate noted during this admission. Most likely this represents delirium, but will check for alternative etiologies.          Plan  · MRI brain w/o contrast  · EEG  · Check homocysteine levels, if elevated consider checking MTHFR           DATE:   5/9  [facility]   ICU           Pertinent Updates:  Consult GI for PEG placement  Hypotensive     Vitals:  96.6     20, 20     80     84/49, 86/49     94% on RA        Physical Exam:  General Appearance: Arousable to voice, severely altered  Neurologic:  Moving ext  Psychiatric: lethargic     Abnl/Pertinent Labs/Radiology/Diagnostic Studies:  Sodium 147  Potassium 3.1  Chloride 114  Glucose 146  Calcium serum 7.0  Total protein 4.2  Albumin 1.8  Rbc 2.71  Hgb 7.8, 7.5, 7.4  Hct 24.1, 23.6, 23.4  Rdw 17.4  Platelet 29           Imaging:  None today     Medications:  Invanz 1000mg ivpb q24hrs  Flonase 50mcg 2 spray each nostril daily  Folic acid 1mg iv daily  Heparin flush 100units iv bid each PICC port  Protonix 40mg iv bid  D5 0.2%NS  with KCI 20meq @75ml/hr continuous infusion  PO Meds held - NPO           Orders:  consult general surgery  consult palliataive care  consult dietittian  NG insertion  Consult IV Team        MD Consults/Assessments & Plans:  IM PN:     Assessment/Plan:               Active Hospital Problems     Diagnosis      Acute metabolic encephalopathy [L21.70]         Priority: Medium      Hypotension-due to hypovolemia/sepsis  -BP improved but borderline. Continue IVF. -Critical care was consulted. Cara Hauser was given midodrine.  Currently NPO thus not getting oral medications,SLP to assess. Recommend echocardiogram.Held lasix.     Acute metabolic encephalopathy-likely secondary to urinary tract infection/bacteremia  No improvement  -CT head non acute X 2  -FU MRI brain. Issues with obtaining info for questionaire. Plan to ask HCP. -Ammonia levels and ABG wnl  -Continue antibiotic for bacteremia  -Neurology consultation. EEG ordered. Homocysteine levels elevated,plan for MTHFR  -Palliative care consultation     ESBL E. coli bacteremia  -ID consulted.  Continue ivanz until 5/15  -Urine culture shows mixed katie  -No pneumonia on CT chest     FEN  -SLP consulted. Mentation is affecting evaluation with video swallow. Do not think corpak can be placed if patient cannot swallow. Consult General Surgery for PEG tube     Hypokalemia  -Monitor and replace     False hyperglycemia  -Labs are being drawn from PICC line with dextrose solution. POCT glucose levels are within range.     Acute on Chronic anemia-suspect bone marrow suppression.  -Hgb <7.0,Transfuse PRBC 5/4. Consent to be obtained from family. Decrease frequency of blood draws.  -Hold Plavix, Lovenox  -FU FOBT  -Folic acid low,started on folic acidosis     Thrombocytopenia-likely due to bacteremia  -Platelets continue trending down.  Transfuse as needed.  Hold Lovenox. Consulted Oncology. Suspect infection and inflammation. Cytometry pending.     Other medical issues.   CHF  HLD  History of chronic back pain and impaired mobility  Hx of polio  Hx of endometrial ca  Hx of uti in march 2022  Hx of intara abdomin infection  Dec 2021     DVT Prophylaxis: SCDs due to anemia  Diet: Diet NPO  Code Status: Limited        General Surgery PN:        ASSESSMENT:  67 y.o. female with acute metabolic encephalopathy secondary to septic shock due to urinary tract infection/bacteremia     PLAN:  Will await result of MRI and EEG  although encephalopathy is acute and should resolve, PEG tube maybe needed for placement purposes. Will discuss with POA  Continue NPO  Discussed with Dr. Caden Santos PN:  Impression/Plan:  68-year-old female patient with past medical history significant for hypertension, CKD, hyperlipidemia, CHF, TIA, anemia, and depression admitted 4/30/2022 with altered mental status from her nursing facility. [address] admission, WBC 19.5, hemoglobin 10.6, hematocrit 33.2, platelets 217, sodium 145, potassium 3.3, BUN 23, creatinine 0.8, calcium 8.4, lactic acid 1.9.  Urinalysis revealed positive nitrites, large amount of leukocyte esterase positive WBC, positive RBC indicative of UTI.  Urine culture from 3/28/2022 positive E. Coli.  Blood culture x1 on 4/30/2022 positive E. Coli.  ID is following.     On 5/1/2022 patient developed hypotension prompting a transfer to the ICU.  NICOM was obtained and indicated patient was fluid responsive.  She received a total of 1.5 L bolus and blood pressure improved.  She was also noted to have a drop in hemoglobin and developed worsening thrombocytopenia.  On 5/1/2022 her hemoglobin dropped from 11.3-8.4.  Hemoglobin then dropped to 6.9 on 5/2/2022 requiring transfusion. Jonah Smith to her transfusion iron studies were obtained. Lonzie Saundra 82, ferritin 1482, iron saturation cannot be calculated due to unsaturated iron binding capacity result being below analytical measuring range.  Vitamin B12 965, folate less than 2.  She has been started on intravenous folic acid.  , Peripheral blood smear revealing thrombocytopenia which may be related to decreased production or increased destruction.  Normocytic anemia.  Leukocytosis with neutrophilia and absolute lymphocytes, monocytes, eosinophils are decreased.     Blood work done prior to admission on 3/27/2022 revealed a normal hemoglobin of 13.7, hematocrit 44, and mild thrombocytopenia with platelets 183.  We have been asked to see her regarding anemia and thrombocytopenia in the setting of sepsis.     New onset anemia and thrombocytopenia likely secondary to sepsis and inflammation.   ID following. On Marrianne Inoue till 05/15/2022. Contact isolation  Peripheral blood smear revealing thrombocytopenia which may be related to decreased production or increased destruction. Normocytic anemia.  Leukocytosis with neutrophilia and absolute lymphocytes, monocytes, eosinophils are decreased. Anemia/Thrombocytopenia workup ordered.     Haptoglobin 242  RF 11 (0-13)  GERARDO Negative  HIV Non-Reactive  Acute hepatitis panel Non-Reactive  Zinc: 30.1 copper: 68, supplement ordered   JAK2 pending  Flow cytometry with increased CD4+/CD8+ Tcells (3% of analyzed events) associated with viral infections, other infections and autoimmune disorders, likely to represent a reactive proliferation.   No significant immunophenotypic abnormalities of remaining T cells, B cells or myeloid elements.    MDS FISH pending  Folate deficient. IV Folic acid   Hb 7.8; PLT 29K 05/09/2022  Monitor CBC with diff. Tranfuse to keep Hgb >7, PLT >10. Will continue to follow     Infectious Disease PN:     IMPRESSION:      1  ESBL ( CTX M ) E coli , Providencia bacteremia, sepsis   2. CAUTI ( Cobos catheter was changed )   3. Leukocytosis - improved   4. Micronutrient deficiency      RECOMMENDATIONS:       1. IV invanz 1 gram q 24 hrs till 5/15/22   2. Contact isolation   3.  Dietician consult         Palliative Care PN:     ASSESSMENT/PLAN:      Pertinent Hospital Diagnoses   · Acute metabolic encephalopathy- MRI and EEG results pending; Neurology following  · New onset anemia and thrombocytopenia - Hem/Onc following  · ESBL E. coli bacteremia- ID following  · UTI-continues on antibiotics  · Hx of Endometrial cancer        Palliative Care Encounter / Counseling Regarding Goals of Care  · Ber[employer] capacity for medical decision-making.  Capacity is time limited and situation/question specific  · During encounter no surrogate medical decision-maker present  · Outcome of goals of care meeting: unable to make contact with cousin; continue current care  · Code status limited; NO CPR; NO INTUBATION; ok for shock and meds  · Advanced Directives: no HPOA or Living Will noted in chart  · Surrogate/Legal NOK:

## 2022-05-22 LAB
JAK2 QUAL MUTATION BY PCR: NOT DETECTED
SOURCE: NORMAL

## 2022-10-05 NOTE — ED PROVIDER NOTES
5:20 PM EDT    I received this patient at sign out from Dr. Marceil Collet   I have discussed the patient's initial exam, treatment and plan of care with the out going physician. I have introduced my self to the patient / family and have answered their questions to this point. I have examined the patient myself and reviewed ordered tests / medications and reviewed any available results to this point. If a resident is involved in the Emergency Department care, I have discussed my findings and plan with them as well. Here with c/o vaginal bleeding  Exam by night staff, really minimal blood on exam and did not appear to have any active bleeding. Prior hysterectomy as well  Labs with SHAWNA  Also with UTI  Medicine consulted for admission    1. Vaginal bleeding    2. Acute kidney injury ([facility] [address]    3.  Acute cystitis with hematuria           Ayesha Wilson MD  03/27/22 0041 GERD (gastroesophageal reflux disease)